# Patient Record
Sex: FEMALE | Race: BLACK OR AFRICAN AMERICAN | NOT HISPANIC OR LATINO | Employment: UNEMPLOYED | URBAN - METROPOLITAN AREA
[De-identification: names, ages, dates, MRNs, and addresses within clinical notes are randomized per-mention and may not be internally consistent; named-entity substitution may affect disease eponyms.]

---

## 2017-03-31 ENCOUNTER — HOSPITAL ENCOUNTER (EMERGENCY)
Facility: HOSPITAL | Age: 37
Discharge: HOME | End: 2017-03-31
Attending: EMERGENCY MEDICINE
Payer: MEDICAID

## 2017-03-31 VITALS
BODY MASS INDEX: 39.68 KG/M2 | HEART RATE: 64 BPM | OXYGEN SATURATION: 100 % | RESPIRATION RATE: 18 BRPM | TEMPERATURE: 98 F | WEIGHT: 293 LBS | DIASTOLIC BLOOD PRESSURE: 81 MMHG | HEIGHT: 72 IN | SYSTOLIC BLOOD PRESSURE: 172 MMHG

## 2017-03-31 DIAGNOSIS — R60.0 LOWER EXTREMITY EDEMA: Primary | ICD-10-CM

## 2017-03-31 DIAGNOSIS — D64.9 ANEMIA, UNSPECIFIED TYPE: ICD-10-CM

## 2017-03-31 LAB
ALBUMIN SERPL BCP-MCNC: 3.5 G/DL
ALP SERPL-CCNC: 56 U/L
ALT SERPL W/O P-5'-P-CCNC: 13 U/L
ANION GAP SERPL CALC-SCNC: 7 MMOL/L
AST SERPL-CCNC: 14 U/L
B-HCG UR QL: NEGATIVE
BASOPHILS # BLD AUTO: 0.01 K/UL
BASOPHILS NFR BLD: 0.2 %
BILIRUB SERPL-MCNC: 0.3 MG/DL
BILIRUB UR QL STRIP: NEGATIVE
BNP SERPL-MCNC: 21 PG/ML
BUN SERPL-MCNC: 10 MG/DL
CALCIUM SERPL-MCNC: 8.7 MG/DL
CHLORIDE SERPL-SCNC: 108 MMOL/L
CLARITY UR: CLEAR
CO2 SERPL-SCNC: 24 MMOL/L
COLOR UR: YELLOW
CREAT SERPL-MCNC: 0.9 MG/DL
CTP QC/QA: YES
DIFFERENTIAL METHOD: ABNORMAL
EOSINOPHIL # BLD AUTO: 0.1 K/UL
EOSINOPHIL NFR BLD: 1.5 %
ERYTHROCYTE [DISTWIDTH] IN BLOOD BY AUTOMATED COUNT: 15.1 %
EST. GFR  (AFRICAN AMERICAN): >60 ML/MIN/1.73 M^2
EST. GFR  (NON AFRICAN AMERICAN): >60 ML/MIN/1.73 M^2
GLUCOSE SERPL-MCNC: 90 MG/DL
GLUCOSE UR QL STRIP: NEGATIVE
HCT VFR BLD AUTO: 29.9 %
HGB BLD-MCNC: 9.4 G/DL
HGB UR QL STRIP: NEGATIVE
KETONES UR QL STRIP: NEGATIVE
LEUKOCYTE ESTERASE UR QL STRIP: NEGATIVE
LYMPHOCYTES # BLD AUTO: 2.1 K/UL
LYMPHOCYTES NFR BLD: 39.5 %
MCH RBC QN AUTO: 27.5 PG
MCHC RBC AUTO-ENTMCNC: 31.4 %
MCV RBC AUTO: 87 FL
MONOCYTES # BLD AUTO: 0.4 K/UL
MONOCYTES NFR BLD: 8.1 %
NEUTROPHILS # BLD AUTO: 2.7 K/UL
NEUTROPHILS NFR BLD: 50.5 %
NITRITE UR QL STRIP: NEGATIVE
PH UR STRIP: 6 [PH] (ref 5–8)
PLATELET # BLD AUTO: 237 K/UL
PMV BLD AUTO: 10.2 FL
POTASSIUM SERPL-SCNC: 3.8 MMOL/L
PROT SERPL-MCNC: 7.1 G/DL
PROT UR QL STRIP: NEGATIVE
RBC # BLD AUTO: 3.42 M/UL
SODIUM SERPL-SCNC: 139 MMOL/L
SP GR UR STRIP: 1.03 (ref 1–1.03)
TROPONIN I SERPL DL<=0.01 NG/ML-MCNC: <0.006 NG/ML
URN SPEC COLLECT METH UR: NORMAL
UROBILINOGEN UR STRIP-ACNC: NEGATIVE EU/DL
WBC # BLD AUTO: 5.34 K/UL

## 2017-03-31 PROCEDURE — 80053 COMPREHEN METABOLIC PANEL: CPT

## 2017-03-31 PROCEDURE — 84484 ASSAY OF TROPONIN QUANT: CPT

## 2017-03-31 PROCEDURE — 83880 ASSAY OF NATRIURETIC PEPTIDE: CPT

## 2017-03-31 PROCEDURE — 99284 EMERGENCY DEPT VISIT MOD MDM: CPT | Mod: 25

## 2017-03-31 PROCEDURE — 81003 URINALYSIS AUTO W/O SCOPE: CPT

## 2017-03-31 PROCEDURE — 25000003 PHARM REV CODE 250: Performed by: EMERGENCY MEDICINE

## 2017-03-31 PROCEDURE — 81025 URINE PREGNANCY TEST: CPT | Performed by: EMERGENCY MEDICINE

## 2017-03-31 PROCEDURE — 85025 COMPLETE CBC W/AUTO DIFF WBC: CPT

## 2017-03-31 RX ORDER — DOCUSATE SODIUM 100 MG/1
100 CAPSULE, LIQUID FILLED ORAL 2 TIMES DAILY PRN
Qty: 60 CAPSULE | Refills: 2 | Status: SHIPPED | OUTPATIENT
Start: 2017-03-31 | End: 2017-08-17

## 2017-03-31 RX ORDER — FERROUS SULFATE 325(65) MG
325 TABLET ORAL DAILY
Qty: 30 TABLET | Refills: 2 | Status: SHIPPED | OUTPATIENT
Start: 2017-03-31 | End: 2017-08-25

## 2017-03-31 RX ORDER — FUROSEMIDE 20 MG/1
20 TABLET ORAL DAILY PRN
Qty: 30 TABLET | Refills: 2 | Status: SHIPPED | OUTPATIENT
Start: 2017-03-31 | End: 2017-08-17

## 2017-03-31 RX ORDER — FUROSEMIDE 20 MG/1
20 TABLET ORAL
Status: COMPLETED | OUTPATIENT
Start: 2017-03-31 | End: 2017-03-31

## 2017-03-31 RX ADMIN — FUROSEMIDE 20 MG: 20 TABLET ORAL at 05:03

## 2017-03-31 NOTE — ED PROVIDER NOTES
"Encounter Date: 3/31/2017    SCRIBE #1 NOTE: I, Apple Oliveira, am scribing for, and in the presence of,  Margot Zavala MD. I have scribed the following portions of the note - Other sections scribed: HPI, ROS.       History     Chief Complaint   Patient presents with    Foot Swelling     "I have swelling in my legs and feet x4 days."     Review of patient's allergies indicates:  No Known Allergies  HPI Comments: CC: Foot Swelling    HPI: 37 year old female with a PMHx of HTN and arthritis presents to the ED c/o acute bilateral foot swelling which started 6 days ago. Patient states she is experiencing "tightness and tingling" from her knees to her feet. Patient reports associated intermittent right sided chest pain described as a pressure. Patient notes she has been sitting more, but nothing unusual like dietary change or long trips. Patient denies a hx of similar symptoms. Patient reports compliance with her daily Losartan-Hydrochlorothiazide. Patient otherwise denies fatigue, shortness of breath and other symptoms.      The history is provided by the patient. No  was used.     Past Medical History:   Diagnosis Date    Arthritis     Hypertension      Past Surgical History:   Procedure Laterality Date     SECTION, LOW TRANSVERSE  2010    TUBAL LIGATION           Family History   Problem Relation Age of Onset    Hypertension Mother      Social History   Substance Use Topics    Smoking status: Former Smoker    Smokeless tobacco: Never Used    Alcohol use No     Review of Systems   Constitutional: Negative for appetite change, fatigue and unexpected weight change.   HENT: Negative for ear pain, rhinorrhea and sore throat.    Eyes: Negative for pain.   Respiratory: Negative for shortness of breath.    Cardiovascular: Positive for chest pain (right sided) and leg swelling (bilateral).   Gastrointestinal: Negative for abdominal pain, diarrhea, nausea and vomiting. "   Genitourinary: Negative for dysuria.   Musculoskeletal: Negative for back pain and neck stiffness.   Skin: Negative for rash.   Neurological: Positive for numbness (bilateral feet). Negative for light-headedness and headaches.       Physical Exam   Initial Vitals   BP Pulse Resp Temp SpO2   03/31/17 0318 03/31/17 0318 03/31/17 0318 03/31/17 0318 03/31/17 0318   149/79 73 18 98 °F (36.7 °C) 100 %     Physical Exam    Nursing note and vitals reviewed.  Constitutional: She appears well-developed and well-nourished. She is Obese . She is cooperative.  Non-toxic appearance. No distress.   Speaking complete sentences.    HENT:   Head: Normocephalic and atraumatic.   Mouth/Throat: Oropharynx is clear and moist.   Eyes: EOM are normal. Pupils are equal, round, and reactive to light.   Conj pallor.   Neck: Normal range of motion and full passive range of motion without pain. Neck supple. No thyromegaly present. No JVD present.   Cardiovascular: Normal rate, regular rhythm, normal heart sounds and normal pulses.   No murmur heard.  Pulmonary/Chest: Effort normal and breath sounds normal. No respiratory distress.   Lungs clear to auscultation.    Abdominal: Soft. Normal appearance and bowel sounds are normal. She exhibits no distension and no mass. There is no tenderness.   Musculoskeletal: Normal range of motion.   1+ edema to bilateral feet. No calf tenderness.    Neurological: She is alert and oriented to person, place, and time. She has normal strength.   Skin: Skin is warm, dry and intact. No rash noted. There is pallor.   Psychiatric: She has a normal mood and affect. Her speech is normal.         ED Course   Procedures  Labs Reviewed   CBC W/ AUTO DIFFERENTIAL - Abnormal; Notable for the following:        Result Value    RBC 3.42 (*)     Hemoglobin 9.4 (*)     Hematocrit 29.9 (*)     MCHC 31.4 (*)     RDW 15.1 (*)     All other components within normal limits   URINALYSIS   COMPREHENSIVE METABOLIC PANEL   B-TYPE  NATRIURETIC PEPTIDE   TROPONIN I   POCT URINE PREGNANCY     EKG Readings: (Independently Interpreted)   04:15: NSR, HR 61. Normal axis and intervals. No STEMI.       X-Rays:   Independently Interpreted Readings:   Other Readings:  CXR NAD    Medical Decision Making:   History:   Old Medical Records: I decided to obtain old medical records.  Old Records Summarized: records from previous admission(s).  Initial Assessment:   37 y.o. Female with bilateral LE swelling x several days.  Differential Diagnosis:   Ddx includes KATTY, CHF, gravitational edema, other.  Independently Interpreted Test(s):   I have ordered and independently interpreted X-rays - see prior notes.  I have ordered and independently interpreted EKG Reading(s) - see prior notes  Clinical Tests:   Lab Tests: Ordered and Reviewed  Radiological Study: Reviewed and Ordered  Medical Tests: Ordered and Reviewed  ED Management:  Patient with minimal edema to my exam, pedal only. No calf TTP. Labs reassuring aside from reportedly chronic anemia -- no proteinuria, no elevated BNP, etc. CXR also without evidence of heart failure. BP here elevated, other VSS. Will add additional diuretic to patient's medication regimen -- had PO lasix here and will d/c on same. Encouraged to f/u to PMD for BP recheck and reevaluation of edema. Also educated re: propping up legs, compression stockings. D/c'ed in NAD.            Scribe Attestation:   Scribe #1: I performed the above scribed service and the documentation accurately describes the services I performed. I attest to the accuracy of the note.    Attending Attestation:           Physician Attestation for Scribe:  Physician Attestation Statement for Scribe #1: I, Margot Zavala MD, reviewed documentation, as scribed by Apple Oliveira in my presence, and it is both accurate and complete.                 ED Course     Clinical Impression:   The primary encounter diagnosis was Lower extremity edema. A diagnosis of Anemia,  unspecified type was also pertinent to this visit.          Margot Zavala MD  04/02/17 2565

## 2017-03-31 NOTE — ED TRIAGE NOTES
37 year old female arrives to the ED via personal transportation due to bilaterally LE edema x 3-4 days. Pt reports pain 7/10. Pt has hx of HTN. Denies SOB, CP, abdominal pain and in no acute distress.

## 2017-03-31 NOTE — ED AVS SNAPSHOT
[facility]  [address] [place] [address]               Maryam Oneal   3/31/2017  3:21 AM   ED    Description:  Female : 1980   Department:  [facility]           Your Care was Coordinated By:     Provider Role From To    Margot Zavala MD Attending Provider 17 0415 --      Reason for Visit     Foot Swelling           Diagnoses this Visit        Comments    Lower extremity edema    -  Primary     Anemia, unspecified type           ED Disposition     None           To Do List           Follow-up Information     Follow up with Mich Castle MD In 2 weeks.    Specialty:  Family Medicine    Contact information:    [address] [facility] [address]  [phone]         These Medications        Disp Refills Start End    furosemide (LASIX) 20 MG tablet 30 tablet 2 3/31/2017 3/31/2018    Take 1 tablet (20 mg total) by mouth daily as needed (swelling of feet). - Oral    Pharmacy: [email or web address] [address] [place] [facility] Ph #: [phone]       docusate sodium (COLACE) 100 MG capsule 60 capsule 2 3/31/2017     Take 1 capsule (100 mg total) by mouth 2 (two) times daily as needed for Constipation. - Oral    Pharmacy: [email or web address] [address] [place] AT [facility] Ph #: [phone]       ferrous sulfate 325 mg (65 mg iron) Tab tablet 30 tablet 2 3/31/2017     Take 1 tablet (325 mg total) by mouth once daily. - Oral    Pharmacy: [address] [place] [facility] Ph #: [phone]         [facility]     [facility] Care Line -  Assistance  Unless otherwise directed by your provider, please contact [facility], our nurse care line that is available for  assistance.     Registered nurses in the [facility] provide: appointment scheduling, clinical  advisement, health education, and other advisory services.  Call: [phone] (toll free)               Medications           Message regarding Medications     Verify the changes and/or additions to your medication regime listed below are the same as discussed with your clinician today.  If any of these changes or additions are incorrect, please notify your healthcare provider.        START taking these NEW medications        Refills    furosemide (LASIX) 20 MG tablet 2    Sig: Take 1 tablet (20 mg total) by mouth daily as needed (swelling of feet).    Class: Print    Route: Oral    docusate sodium (COLACE) 100 MG capsule 2    Sig: Take 1 capsule (100 mg total) by mouth 2 (two) times daily as needed for Constipation.    Class: Print    Route: Oral    ferrous sulfate 325 mg (65 mg iron) Tab tablet 2    Sig: Take 1 tablet (325 mg total) by mouth once daily.    Class: Print    Route: Oral           Verify that the below list of medications is an accurate representation of the medications you are currently taking.  If none reported, the list may be blank. If incorrect, please contact your healthcare provider. Carry this list with you in case of emergency.           Current Medications     losartan-hydrochlorothiazide 100-25 mg (HYZAAR) 100-25 mg per tablet Take 1 tablet by mouth once daily.    acetaminophen (TYLENOL) 325 MG tablet Take 1 tablet (325 mg total) by mouth every 6 (six) hours as needed for Pain.    docusate sodium (COLACE) 100 MG capsule Take 1 capsule (100 mg total) by mouth 2 (two) times daily as needed for Constipation.    ferrous sulfate 325 mg (65 mg iron) Tab tablet Take 1 tablet (325 mg total) by mouth once daily.    furosemide (LASIX) 20 MG tablet Take 1 tablet (20 mg total) by mouth daily as needed (swelling of feet).    ibuprofen (ADVIL,MOTRIN) 800 MG tablet Take 1 tablet (800 mg total) by mouth every 6 (six) hours as needed for Pain.           Clinical Reference Information           Your  "Vitals Were     BP Pulse Temp Resp Height Weight    168/86 66 98 °F (36.7 °C) (Oral) 18 6' 4" (1.93 m) 181.4 kg (400 lb)    SpO2 BMI             100% 48.69 kg/m2         Allergies as of 3/31/2017     No Known Allergies      Immunizations Administered on Date of Encounter - 3/31/2017     None      ED Micro, Lab, POCT     Start Ordered       Status Ordering Provider    03/31/17 0336 03/31/17 0336  POCT urine pregnancy  Once      Acknowledged     03/31/17 0336 03/31/17 0336  Urinalysis  STAT      Acknowledged     03/31/17 0336 03/31/17 0336  Comprehensive metabolic panel  STAT      Final result     03/31/17 0336 03/31/17 0336  CBC auto differential  STAT      Final result     03/31/17 0336 03/31/17 0336  Brain natriuretic peptide  STAT      Final result     03/31/17 0336 03/31/17 0336  Troponin I  STAT      Final result       ED Imaging Orders     Start Ordered       Status Ordering Provider    03/31/17 0336 03/31/17 0336  X-Ray Chest PA And Lateral  1 time imaging      Final result       Discharge References/Attachments     LEG SWELLING IN BOTH LEGS (ENGLISH)      Smoking Cessation     If you would like to quit smoking:   You may be eligible for free services if you are a [place] resident and started smoking cigarettes before September 1, 1988.  Call the [employer]oking [employer]ess[employer] ([employer]) toll free at (756) 946-8670 or (894) 011-7648.   Call 0-153-QUIT-NOW if you do not meet the above criteria.   Contact us via email: [email or web address]   View our website for more information: [email or web address]         [facility] complies with applicable Federal civil rights laws and does not discriminate on the basis of race, color, national origin, age, disability, or sex.        Language Assistance Services     ATTENTION: Language assistance services are available, free of charge. Please call [phone].      ATENCIÓN: Si habla español, tiene a petersen disposición servicios gratuitos de asistencia " lingüística. [facility] [phone].     MARLEY Ý: N?u b?n nói Ti?ng Vi?t, có các d?ch v? h? tr? ngôn ng? mi?n phí dành cho b?n. G?i s? [phone].

## 2017-04-19 ENCOUNTER — HOSPITAL ENCOUNTER (EMERGENCY)
Facility: OTHER | Age: 37
Discharge: HOME | End: 2017-04-19
Attending: EMERGENCY MEDICINE
Payer: MEDICAID

## 2017-04-19 VITALS
OXYGEN SATURATION: 100 % | BODY MASS INDEX: 39.68 KG/M2 | SYSTOLIC BLOOD PRESSURE: 148 MMHG | TEMPERATURE: 98 F | DIASTOLIC BLOOD PRESSURE: 88 MMHG | HEART RATE: 64 BPM | RESPIRATION RATE: 18 BRPM | WEIGHT: 293 LBS | HEIGHT: 72 IN

## 2017-04-19 DIAGNOSIS — M79.669 PAIN AND SWELLING OF LOWER LEG: ICD-10-CM

## 2017-04-19 DIAGNOSIS — M79.89 PAIN AND SWELLING OF LOWER LEG: ICD-10-CM

## 2017-04-19 LAB
ALBUMIN SERPL-MCNC: 3.3 G/DL (ref 3.3–5.5)
ALP SERPL-CCNC: 49 U/L (ref 42–141)
BILIRUB SERPL-MCNC: 0.7 MG/DL (ref 0.2–1.6)
BILIRUBIN, POC UA: NEGATIVE
BLOOD, POC UA: NEGATIVE
BUN SERPL-MCNC: 10 MG/DL (ref 7–22)
CALCIUM SERPL-MCNC: 9.1 MG/DL (ref 8–10.3)
CHLORIDE SERPL-SCNC: 104 MMOL/L (ref 98–108)
CLARITY, POC UA: CLEAR
COLOR, POC UA: YELLOW
CREAT SERPL-MCNC: 0.7 MG/DL (ref 0.6–1.2)
GLUCOSE SERPL-MCNC: 95 MG/DL (ref 73–118)
GLUCOSE, POC UA: NEGATIVE
KETONES, POC UA: NEGATIVE
LEUKOCYTE EST, POC UA: NEGATIVE
NITRITE, POC UA: NEGATIVE
PH UR STRIP: 5.5 [PH]
POC ALT (SGPT): 10 U/L (ref 10–47)
POC AST (SGOT): 17 U/L (ref 11–38)
POC B-TYPE NATRIURETIC PEPTIDE: 18.6 PG/ML (ref 0–100)
POC TCO2: 26 MMOL/L (ref 18–33)
POTASSIUM BLD-SCNC: 3.7 MMOL/L (ref 3.6–5.1)
PROTEIN, POC UA: NEGATIVE
PROTEIN, POC: 7.1 G/DL (ref 6.4–8.1)
SODIUM BLD-SCNC: 137 MMOL/L (ref 128–145)
SPECIFIC GRAVITY, POC UA: >=1.03
UROBILINOGEN, POC UA: 1 E.U./DL

## 2017-04-19 PROCEDURE — 99284 EMERGENCY DEPT VISIT MOD MDM: CPT

## 2017-04-19 NOTE — ED AVS SNAPSHOT
[facility] EMERGENCY DEPARTMENT  [address] [address]               Maryam Oneal   2017  9:50 AM   ED    Description:  Female : 1980   Department:  [facility]           Your Care was Coordinated By:     Provider Role From To    Farheen Story MD Attending Provider 17 1019 --      Reason for Visit     Leg Pain           Diagnoses this Visit        Comments    Pain and swelling of lower leg           ED Disposition     None           To Do List           Follow-up Information     Follow up with Mich Castle MD.    Specialty:  Family Medicine    Why:  CALL TO SCHEDULE CLOSE FOLLOW UP APPOINTMENT    Contact information:    [address]  [facility] [address]  [phone]          Follow up with Clayton Jackson NP.    Specialty:  Internal Medicine    Why:  CALL TO SCHEDULE FOLLOW UP APPOINTMENT    Contact information:    [address] [address]  [phone]        [facility]     [facility] On [facility] Nurse Care Line -  Assistance  Unless otherwise directed by your provider, please contact [facility], our nurse care line that is available for  assistance.     Registered nurses in the [facility] On Call Center provide: appointment scheduling, clinical advisement, health education, and other advisory services.  Call: [phone] (toll free)               Medications           Message regarding Medications     Verify the changes and/or additions to your medication regime listed below are the same as discussed with your clinician today.  If any of these changes or additions are incorrect, please notify your healthcare provider.             Verify that the below list of medications is an accurate representation of the medications you are currently taking.  If none reported, the list may be blank. If incorrect, please contact your healthcare provider. Carry this list with you in case of emergency.     "       Current Medications     losartan-hydrochlorothiazide 100-25 mg (HYZAAR) 100-25 mg per tablet Take 1 tablet by mouth once daily.    acetaminophen (TYLENOL) 325 MG tablet Take 1 tablet (325 mg total) by mouth every 6 (six) hours as needed for Pain.    docusate sodium (COLACE) 100 MG capsule Take 1 capsule (100 mg total) by mouth 2 (two) times daily as needed for Constipation.    ferrous sulfate 325 mg (65 mg iron) Tab tablet Take 1 tablet (325 mg total) by mouth once daily.    furosemide (LASIX) 20 MG tablet Take 1 tablet (20 mg total) by mouth daily as needed (swelling of feet).    ibuprofen (ADVIL,MOTRIN) 800 MG tablet Take 1 tablet (800 mg total) by mouth every 6 (six) hours as needed for Pain.           Clinical Reference Information           Your Vitals Were     BP Pulse Temp Resp Height Weight    148/88 (BP Location: Left arm, Patient Position: Lying) 64 97.9 °F (36.6 °C) (Oral) 18 6' 4" (1.93 m) 181.4 kg (400 lb)    Last Period SpO2 BMI          03/20/2017 (Exact Date) 100% 48.69 kg/m2        Allergies as of 4/19/2017     No Known Allergies      Immunizations Administered on Date of Encounter - 4/19/2017     None      ED Micro, Lab, POCT     Start Ordered       Status Ordering Provider    04/19/17 1114 04/19/17 1114  POCT B-type natriuretic peptide (BNP)  Once      Final result     04/19/17 1100 04/19/17 1059  POCT CBC  Once      Final result     04/19/17 1048 04/19/17 1048  POCT URINALYSIS W/O SCOPE  Once      Final result     04/19/17 1037 04/19/17 1037  POCT CMP  Once      Final result     04/19/17 1032 04/19/17 1031  POCT B-type natriuretic peptide (BNP)  Once      Completed     04/19/17 1032 04/19/17 1031  POCT CMP  Once      Completed     04/19/17 1032 04/19/17 1031  POCT URINALYSIS W/O SCOPE  Once      Completed       ED Imaging Orders     Start Ordered       Status Ordering Provider    04/19/17 1032 04/19/17 1031  US Lower Extremity Veins Bilateral  1 time imaging      Final result     "     Discharge Instructions           CALL TO SCHEDULE A FOLLOW UP APPOINTMENT WITH PRIMARY CARE    Low-Salt Diet  This diet removes foods that are high in salt. It also limits the amount of salt you use when cooking. It is most often used for people with high blood pressure, edema (fluid retention), and kidney, liver, or heart disease.  Table salt contains the mineral sodium. Your body needs sodium to work normally. But too much sodium can make your health problems worse. Your healthcare provider is recommending a low-salt (also called low-sodium) diet for you. Your total daily allowance of salt is 1,500 to 2,300 milligrams (mg). It is less than 1 teaspoon of table salt. This means you can have only about 500 to 700 mg of sodium at each meal. People with certain health problems should limit salt intake to the lower end of the recommended range.    When you cook, dont add much salt. If you can cook without using salt, even better. Dont add salt to your food at the table.  When shopping, read food labels. Salt is often called sodium on the label. Choose foods that are salt-free, low salt, or very low salt. Note that foods with reduced salt may not lower your salt intake enough.    Beans, potatoes, and pasta  Ok: Dry beans, split peas, lentils, potatoes, rice, macaroni, pasta, spaghetti without added salt  Avoid: Potato chips, tortilla chips, and similar products  Breads and cereals  Ok: Low-sodium breads, rolls, cereals, and cakes; low-salt crackers, matzo crackers  Avoid: Salted crackers, pretzels, popcorn, Sami toast, pancakes, muffins  Dairy  Ok: Milk, chocolate milk, hot chocolate mix, low-salt cheeses, and yogurt  Avoid: Processed cheese and cheese spreads; Roquefort, Camembert, and cottage cheese; buttermilk, instant breakfast drink  Desserts  Ok: Ice cream, frozen yogurt, juice bars, gelatin, cookies and pies, sugar, honey, jelly, hard candy  Avoid: Most pies, cakes and cookies prepared or processed with  salt; instant pudding  Drinks  Ok: Tea, coffee, fizzy (carbonated) drinks, juices  Avoid: Flavored coffees, electrolyte replacement drinks, sports drinks  Meats  Ok: All fresh meat, fish, poultry, low-salt tuna, eggs, egg substitute  Avoid: Smoked, pickled, brine-cured, or salted meats and fish. This includes schmitt, chipped beef, corned beef, hot dogs, deli meats, ham, kosher meats, salt pork, sausage, canned tuna, salted codfish, smoked salmon, herring, sardines, or anchovies.  Seasonings and spices  Ok: Most seasonings are okay. Good substitutes for salt include: fresh herb blends, hot sauce, lemon, garlic, caballero, vinegar, dry mustard, parsley, cilantro, horseradish, tomato paste, regular margarine, mayonnaise, unsalted butter, cream cheese, vegetable oil, cream, low-salt salad dressing and gravy.  Avoid: Regular ketchup, relishes, pickles, soy sauce, teriyaki sauce, Worcestershire sauce, BBQ sauce, tartar sauce, meat tenderizer, chili sauce, regular gravy, regular salad dressing, salted butter  Soups  Ok: Low-salt soups and broths made with allowed foods  Avoid: Bouillon cubes, soups with smoked or salted meats, regular soup and broth  Vegetables  Ok: Most vegetables are okay; also low-salt tomato and vegetable juices  Avoid: Sauerkraut and other brine-soaked vegetables; pickles and other pickled vegetables; tomato juice, olives  Date Last Reviewed: 8/1/2016 © 2000-2016 [employer]. [address] [place] [address], [place] [address]. All rights reserved. This information is not intended as a substitute for professional medical care. Always follow your healthcare professional's instructions.        Leg Swelling in Both Legs    Swelling of the feet, ankles, and legs is called edema. It is caused by excess fluid that has collected in the tissues. Extra fluid in the body settles in the lowest part because of gravity. This is why the legs and feet are most affected.  Some of the causes for edema  include:  · Disease of the heart like congestive heart failure  · Standing or sitting for long periods of time  · Infection of the feet or legs  · Blood pooling in the veins of your legs (venous insufficiency)  · Dilated veins in your lower leg (varicose veins)  · Garters or other clothing that is tight on your legs. This will cause blood to pool in your legs because the clothing limits blood flow.  · Some medicines such as hormones like birth control pills, some blood pressure medicines like calcium channel blockers (amlodipine) and steroids, some antidepressants like MAO inhibitors and tricyclics  · Menstrual periods that cause you to retain fluids  · Many types of renal disease  · Liver failure or cirrhosis  · Pregnancy, some swelling is normal, but a sudden increase in leg swelling or weight gain can be a sign of a dangerous complication of pregnancy  · Poor nutrition  · Thyroid disease  Medical treatment will depend on what is causing the swelling in your legs. Your healthcare provider may prescribe water pills (diuretics) to get rid of the extra fluid.  Home care  Follow these guidelines when caring for yourself at home:  · Don't wear clothing like garters that is tight on your legs.  · Keep your legs up while lying or sitting.  · If infection, injury, or recent surgery is causing the swelling, stay off your legs as much as possible until symptoms get better.  · If your healthcare provider says that your leg swelling is caused by venous insufficiency or varicose veins, don't sit or  one place for long periods of time. Take breaks and walk about every few hours. Brisk walking is a good exercise. It helps circulate the blood that has collected in your leg. Talk with your provider about using support stockings to stop daytime leg swelling.  · If your provider says that heart disease is causing your leg swelling, follow a low-salt diet to stop extra fluid from staying in your body. You may also need  medicine.  Follow-up care  Follow up with your healthcare provider, or as advised.  When to seek medical advice  Call your healthcare provider right away if any of these occur:  · New shortness of breath or chest pain  · Shortness of breath or chest pain that gets worse  · Swelling in both legs or ankles that gets worse  · Swelling of the abdomen  · Redness, warmth, or swelling in one leg  · Fever of 100.4ºF (38ºC) or higher, or as directed by your healthcare provider  · Yellow color to your skin or eyes  · Rapid, unexplained weight gain  · Having to sleep upright or use an increased number of pillows  Date Last Reviewed: 3/31/2016  © 6375-1383 [employer]. [address] [place] [address], [place] [address]. All rights reserved. This information is not intended as a substitute for professional medical care. Always follow your healthcare professional's instructions.          Smoking Cessation     If you would like to quit smoking:   You may be eligible for free services if you are a [place] resident and started smoking cigarettes before September 1, 1988.  Call the Smoking Cessation Trust ([facility] [[email or web address]]) toll free at [phone] or [phone].   Call 1-800-QUIT-NOW if you do not meet the above criteria.   Contact us via email: [email or web address]   View our website for more information: [email or web address]         [facility] complies with applicable Federal civil rights laws and does not discriminate on the basis of race, color, national origin, age, disability, or sex.        Language Assistance Services     ATTENTION: Language assistance services are available, free of charge. Please call [phone].      ATENCIÓN: Si habla español, tiene a petersen disposición servicios gratuitos de asistencia lingüística. Llame al [phone].     CHÚ Ý: N?u b?n nói Ti?ng Vi?t, có các d?ch v? h? tr? ngôn ng? mi?n phí dành cho b?n. G?i s? [phone].

## 2017-04-19 NOTE — DISCHARGE INSTRUCTIONS
CALL TO SCHEDULE A FOLLOW UP APPOINTMENT WITH PRIMARY CARE    Low-Salt Diet  This diet removes foods that are high in salt. It also limits the amount of salt you use when cooking. It is most often used for people with high blood pressure, edema (fluid retention), and kidney, liver, or heart disease.  Table salt contains the mineral sodium. Your body needs sodium to work normally. But too much sodium can make your health problems worse. Your healthcare provider is recommending a low-salt (also called low-sodium) diet for you. Your total daily allowance of salt is 1,500 to 2,300 milligrams (mg). It is less than 1 teaspoon of table salt. This means you can have only about 500 to 700 mg of sodium at each meal. People with certain health problems should limit salt intake to the lower end of the recommended range.    When you cook, dont add much salt. If you can cook without using salt, even better. Dont add salt to your food at the table.  When shopping, read food labels. Salt is often called sodium on the label. Choose foods that are salt-free, low salt, or very low salt. Note that foods with reduced salt may not lower your salt intake enough.    Beans, potatoes, and pasta  Ok: Dry beans, split peas, lentils, potatoes, rice, macaroni, pasta, spaghetti without added salt  Avoid: Potato chips, tortilla chips, and similar products  Breads and cereals  Ok: Low-sodium breads, rolls, cereals, and cakes; low-salt crackers, matzo crackers  Avoid: Salted crackers, pretzels, popcorn, [place] sor[place] toast, pancakes, muffins  Dairy  Ok: Milk, chocolate milk, hot chocolate mix, low-salt cheeses, and yogurt  Avoid: Processed cheese and cheese spreads; Roquefort, Camembert, and cottage cheese; buttermilk, instant breakfast drink  Desserts  Ok: Ice cream, frozen yogurt, juice bars, gelatin, cookies and pies, sugar, honey, jelly, hard candy  Avoid: Most pies, cakes and cookies prepared or processed with salt; instant pudding  Drinks  Ok:  Tea, coffee, fizzy (carbonated) drinks, juices  Avoid: Flavored coffees, electrolyte replacement drinks, sports drinks  Meats  Ok: All fresh meat, fish, poultry, low-salt tuna, eggs, egg substitute  Avoid: Smoked, pickled, brine-cured, or salted meats and fish. This includes schmitt, chipped beef, corned beef, hot dogs, deli meats, ham, kosher meats, salt pork, sausage, canned tuna, salted codfish, smoked salmon, herring, sardines, or anchovies.  Seasonings and spices  Ok: Most seasonings are okay. Good substitutes for salt include: fresh herb blends, hot sauce, lemon, garlic, caballero, vinegar, dry mustard, parsley, cilantro, horseradish, tomato paste, regular margarine, mayonnaise, unsalted butter, cream cheese, vegetable oil, cream, low-salt salad dressing and gravy.  Avoid: Regular ketchup, relishes, pickles, soy sauce, teriyaki sauce, Worcestershire sauce, BBQ sauce, tartar sauce, meat tenderizer, chili sauce, regular gravy, regular salad dressing, salted butter  Soups  Ok: Low-salt soups and broths made with allowed foods  Avoid: Bouillon cubes, soups with smoked or salted meats, regular soup and broth  Vegetables  Ok: Most vegetables are okay; also low-salt tomato and vegetable juices  Avoid: Sauerkraut and other brine-soaked vegetables; pickles and other pickled vegetables; tomato juice, olives  Date Last Reviewed: 8/1/2016 © 2000-2016 [employer]. [address] [place] [address], [place] [address]. All rights reserved. This information is not intended as a substitute for professional medical care. Always follow your healthcare professional's instructions.        Leg Swelling in Both Legs    Swelling of the feet, ankles, and legs is called edema. It is caused by excess fluid that has collected in the tissues. Extra fluid in the body settles in the lowest part because of gravity. This is why the legs and feet are most affected.  Some of the causes for edema include:  · Disease of the heart like congestive  heart failure  · Standing or sitting for long periods of time  · Infection of the feet or legs  · Blood pooling in the veins of your legs (venous insufficiency)  · Dilated veins in your lower leg (varicose veins)  · Garters or other clothing that is tight on your legs. This will cause blood to pool in your legs because the clothing limits blood flow.  · Some medicines such as hormones like birth control pills, some blood pressure medicines like calcium channel blockers (amlodipine) and steroids, some antidepressants like MAO inhibitors and tricyclics  · Menstrual periods that cause you to retain fluids  · Many types of renal disease  · Liver failure or cirrhosis  · Pregnancy, some swelling is normal, but a sudden increase in leg swelling or weight gain can be a sign of a dangerous complication of pregnancy  · Poor nutrition  · Thyroid disease  Medical treatment will depend on what is causing the swelling in your legs. Your healthcare provider may prescribe water pills (diuretics) to get rid of the extra fluid.  Home care  Follow these guidelines when caring for yourself at home:  · Don't wear clothing like garters that is tight on your legs.  · Keep your legs up while lying or sitting.  · If infection, injury, or recent surgery is causing the swelling, stay off your legs as much as possible until symptoms get better.  · If your healthcare provider says that your leg swelling is caused by venous insufficiency or varicose veins, don't sit or  one place for long periods of time. Take breaks and walk about every few hours. Brisk walking is a good exercise. It helps circulate the blood that has collected in your leg. Talk with your provider about using support stockings to stop daytime leg swelling.  · If your provider says that heart disease is causing your leg swelling, follow a low-salt diet to stop extra fluid from staying in your body. You may also need medicine.  Follow-up care  Follow up with your  healthcare provider, or as advised.  When to seek medical advice  Call your healthcare provider right away if any of these occur:  · New shortness of breath or chest pain  · Shortness of breath or chest pain that gets worse  · Swelling in both legs or ankles that gets worse  · Swelling of the abdomen  · Redness, warmth, or swelling in one leg  · Fever of 100.4ºF (38ºC) or higher, or as directed by your healthcare provider  · Yellow color to your skin or eyes  · Rapid, unexplained weight gain  · Having to sleep upright or use an increased number of pillows  Date Last Reviewed: 3/31/2016  © 9689-8285 [employer]. [address] [place] [address], [place] [address]. All rights reserved. This information is not intended as a substitute for professional medical care. Always follow your healthcare professional's instructions.

## 2017-04-19 NOTE — ED PROVIDER NOTES
"Encounter Date: 2017       History     Chief Complaint   Patient presents with    Leg Pain     c/o right leg pain x 2 weeks. Pt reports being seen and treated with Lasix, swelling decreased but pain continues.     Review of patient's allergies indicates:  No Known Allergies  HPI Comments: 36 Y/O OBESE AAF PRESENTS WITH BLE SWELLING ( R > L) AND RIGHT CALF PAIN.  PT REPORTS CHRONIC SWELLING OF BLE "FOR MANY MONTHS OR MAYBE EVEN LONGER" AND STATES THAT THE "ACHY PAIN" IN THE RIGHT CALF AND POSTERIOR KNEE BEGAN 3-4 DAYS AGO.  PT WAS SEEN FOR THE SWELLING ON 3/31 AND D/C HOME WITH LASIX THAT SHE HAS BEEN TAKING AS DIRECTED.  PT REPORTS "I HAVE TAKEN LASIX IN THE PAST AND IT USUALLY WORKS A LOT FASTER THAN THIS."  SHE REPORTS ONLY MILD IMPROVEMENT SINCE STARTING THE LASIX. NO EXACERBATING FACTORS.  NO NUMBNESS/WEAKNESS/TINGLING.  NO HX OF TRAUMA.  NO HX OF PE/DVT/IMMOBILIZATION/RECENT SURGERY/MALIGNANCY/HORMONE USE.     The history is provided by the patient and medical records. No  was used.     Past Medical History:   Diagnosis Date    Arthritis     Hypertension      Past Surgical History:   Procedure Laterality Date     SECTION, LOW TRANSVERSE  2010    TUBAL LIGATION           Family History   Problem Relation Age of Onset    Hypertension Mother      Social History   Substance Use Topics    Smoking status: Former Smoker    Smokeless tobacco: Never Used    Alcohol use No     Review of Systems   Constitutional: Negative for activity change, appetite change and fever.   HENT: Negative.    Eyes: Negative.    Respiratory: Negative for cough, chest tightness and shortness of breath.    Cardiovascular: Positive for leg swelling. Negative for chest pain and palpitations.   Gastrointestinal: Negative for diarrhea, nausea and vomiting.   Endocrine: Negative for polydipsia and polyuria.   Genitourinary: Negative for decreased urine volume and difficulty urinating. "   Musculoskeletal: Positive for myalgias. Negative for back pain.        RIGHT POSTERIOR CALF AND KNEE PAIN   Skin: Negative for pallor, rash and wound.   Allergic/Immunologic: Negative for immunocompromised state.   Neurological: Negative for weakness and numbness.   Hematological: Does not bruise/bleed easily.   Psychiatric/Behavioral: Negative.    All other systems reviewed and are negative.      Physical Exam   Initial Vitals   BP Pulse Resp Temp SpO2   04/19/17 0945 04/19/17 0945 04/19/17 0945 04/19/17 0945 04/19/17 0945   148/88 64 18 97.9 °F (36.6 °C) 100 %     Physical Exam    Nursing note and vitals reviewed.  Constitutional: She appears well-developed and well-nourished. She is not diaphoretic. No distress.   HENT:   Head: Normocephalic and atraumatic.   Eyes: Conjunctivae and EOM are normal. No scleral icterus.   Neck: Normal range of motion. Neck supple.   Cardiovascular: Normal rate, regular rhythm and normal heart sounds. Exam reveals no gallop and no friction rub.    No murmur heard.  Pulmonary/Chest: Breath sounds normal. No respiratory distress. She has no wheezes. She has no rhonchi. She has no rales.   Abdominal: Soft. She exhibits no distension. There is no tenderness.   Musculoskeletal: Normal range of motion.        Right lower leg: She exhibits tenderness, swelling and edema. She exhibits no bony tenderness, no deformity and no laceration.        Legs:  BLE NON-PITTING EDEMA - MILD   Lymphadenopathy:     She has no cervical adenopathy.   Neurological: She is alert and oriented to person, place, and time.   Skin: Skin is warm and dry. No erythema.   Psychiatric: She has a normal mood and affect. Her behavior is normal. Judgment and thought content normal.         ED Course   Procedures  Labs Reviewed   POCT URINALYSIS W/O SCOPE - Abnormal; Notable for the following:        Result Value    Glucose, UA Negative (*)     Bilirubin, UA Negative (*)     Ketones, UA Negative (*)     Spec Grav UA  >=1.030 (*)     Blood, UA Negative (*)     Protein, UA Negative (*)     Nitrite, UA Negative (*)     Leukocytes, UA Negative (*)     All other components within normal limits   POCT CMP - Abnormal; Notable for the following:     Albumin, POC 3.3 (*)     ALT (SGPT), POC 10 (*)     All other components within normal limits   POCT URINALYSIS W/O SCOPE   POCT CBC   POCT B-TYPE NATRIURETIC PEPTIDE (BNP)   POCT CMP   POCT B-TYPE NATRIURETIC PEPTIDE (BNP)             Medical Decision Making:   History:   Old Medical Records: I decided to obtain old medical records.  Old Records Summarized: other records.       <> Summary of Records: ED VISIT 3/31/17:  NEG BNP, U/A AND CHEM PANEL.  PT D/C HOME WITH LASIX RX AND INSTRUCTED TO F/U WITH PCP.  Initial Assessment:   38 Y/O F WITH BLE SWELLING (R>L) AND RIGHT CALF PAIN AND POSTERIOR KNEE PAIN.  NO HX OF DVT/PE.  NON-SMOKER.  FULL ROM.  SENSATION AND MOTOR INTACT.    Differential Diagnosis:   DDX: KATTY, DVT, DEPENDENT EDEMA  Clinical Tests:   Lab Tests: Ordered and Reviewed  The following lab test(s) were unremarkable: BMP, Urinalysis and BNP       <> Summary of Lab: CHRONIC ANEMIA IMPROVED COMPARED TO LABS ON 3/31  NEG BNP  NO PROTIENURIA  Radiological Study: Ordered and Reviewed  ED Management:  US IS LIMITED DUE TO PT BODY HABITUS BUT NO EVIDENCE OF DVT IS FOUND IN RLE WHERE THE PATIENT IS SYMPTOMATIC.       PT IS CURRENTLY TAKING LASIX FOR SWELLING AND REPORTS ONLY MILD IMPROVEMENT.  PT HAS A PCP AND I HAVE INSTRUCTED HER TO F/U WITH PCP REGARDING THIS CHRONIC MATTER.  NO EMERGENT CAUSES OF LEG SWELLING FOUND ON THIS ED EVALUATION.     Pt counseled on their diagnosis and the importance of following up with PCP.  Pt also cautioned on when to return to ED.  Pt verbalizes understanding of discharge plan and will return to ED immediately if symptoms worsen                     ED Course     Clinical Impression:   The encounter diagnosis was Pain and swelling of lower  leg.    Disposition:   Disposition: Discharged  Condition: Stable       Farheen Story MD  04/19/17 2007

## 2017-04-19 NOTE — ED NOTES
38 y/o female presents to the ER with the cc of bilateral leg pain that has persisted despite fluid pills given from her PCP. Patient reports she takes Lasix 20 mg once a day and it does not seem like the fluid is not  Resolving. Patient has + swelling + pitting edema bilaterally to the right and left lower legs. Patient reports pain and tenderness with walking. ANNALISA

## 2017-08-17 ENCOUNTER — HOSPITAL ENCOUNTER (EMERGENCY)
Facility: HOSPITAL | Age: 37
Discharge: HOME | End: 2017-08-17
Attending: EMERGENCY MEDICINE
Payer: MEDICAID

## 2017-08-17 VITALS
SYSTOLIC BLOOD PRESSURE: 172 MMHG | DIASTOLIC BLOOD PRESSURE: 86 MMHG | RESPIRATION RATE: 20 BRPM | BODY MASS INDEX: 39.68 KG/M2 | HEART RATE: 64 BPM | OXYGEN SATURATION: 100 % | HEIGHT: 72 IN | WEIGHT: 293 LBS | TEMPERATURE: 99 F

## 2017-08-17 DIAGNOSIS — R51.9 FRONTAL HEADACHE: Primary | ICD-10-CM
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
## 2017-08-17 DIAGNOSIS — M79.652 LEFT THIGH PAIN: ICD-10-CM

## 2017-08-17 LAB
B-HCG UR QL: NEGATIVE
CTP QC/QA: YES

## 2017-08-17 PROCEDURE — 81025 URINE PREGNANCY TEST: CPT | Performed by: PHYSICIAN ASSISTANT

## 2017-08-17 PROCEDURE — 25000003 PHARM REV CODE 250: Performed by: PHYSICIAN ASSISTANT

## 2017-08-17 PROCEDURE — 99283 EMERGENCY DEPT VISIT LOW MDM: CPT | Mod: 25

## 2017-08-17 RX ORDER — BUTALBITAL, ACETAMINOPHEN AND CAFFEINE 50; 325; 40 MG/1; MG/1; MG/1
2 TABLET ORAL
Status: COMPLETED | OUTPATIENT
Start: 2017-08-17 | End: 2017-08-17

## 2017-08-17 RX ORDER — BUTALBITAL, ACETAMINOPHEN AND CAFFEINE 50; 325; 40 MG/1; MG/1; MG/1
1 TABLET ORAL EVERY 4 HOURS PRN
Qty: 12 TABLET | Refills: 0 | Status: SHIPPED | OUTPATIENT
Start: 2017-08-17 | End: 2017-08-25

## 2017-08-17 RX ADMIN — BUTALBITAL, ACETAMINOPHEN AND CAFFEINE 2 TABLET: 50; 325; 40 TABLET ORAL at 10:08
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
## 2017-08-25 ENCOUNTER — HOSPITAL ENCOUNTER (EMERGENCY)
Facility: HOSPITAL | Age: 37
Discharge: HOME | End: 2017-08-25
Attending: EMERGENCY MEDICINE
Payer: MEDICAID

## 2017-08-25 VITALS
DIASTOLIC BLOOD PRESSURE: 86 MMHG | WEIGHT: 293 LBS | RESPIRATION RATE: 20 BRPM | SYSTOLIC BLOOD PRESSURE: 166 MMHG | TEMPERATURE: 99 F | BODY MASS INDEX: 39.68 KG/M2 | OXYGEN SATURATION: 100 % | HEIGHT: 72 IN | HEART RATE: 64 BPM

## 2017-08-25 DIAGNOSIS — Z23 NEED FOR TETANUS BOOSTER: ICD-10-CM

## 2017-08-25 DIAGNOSIS — S61.211A LACERATION OF LEFT INDEX FINGER WITHOUT FOREIGN BODY WITHOUT DAMAGE TO NAIL, INITIAL ENCOUNTER: Primary | ICD-10-CM

## 2017-08-25 LAB
B-HCG UR QL: NEGATIVE
CTP QC/QA: YES

## 2017-08-25 PROCEDURE — 90715 TDAP VACCINE 7 YRS/> IM: CPT | Performed by: NURSE PRACTITIONER

## 2017-08-25 PROCEDURE — 63600175 PHARM REV CODE 636 W HCPCS: Performed by: NURSE PRACTITIONER

## 2017-08-25 PROCEDURE — 90471 IMMUNIZATION ADMIN: CPT | Performed by: NURSE PRACTITIONER

## 2017-08-25 PROCEDURE — 99283 EMERGENCY DEPT VISIT LOW MDM: CPT | Mod: 25

## 2017-08-25 PROCEDURE — 81025 URINE PREGNANCY TEST: CPT | Performed by: NURSE PRACTITIONER

## 2017-08-25 PROCEDURE — 12001 RPR S/N/AX/GEN/TRNK 2.5CM/<: CPT | Mod: F1

## 2017-08-25 RX ADMIN — CLOSTRIDIUM TETANI TOXOID ANTIGEN (FORMALDEHYDE INACTIVATED), CORYNEBACTERIUM DIPHTHERIAE TOXOID ANTIGEN (FORMALDEHYDE INACTIVATED), BORDETELLA PERTUSSIS TOXOID ANTIGEN (GLUTARALDEHYDE INACTIVATED), BORDETELLA PERTUSSIS FILAMENTOUS HEMAGGLUTININ ANTIGEN (FORMALDEHYDE INACTIVATED), BORDETELLA PERTUSSIS PERTACTIN ANTIGEN, AND BORDETELLA PERTUSSIS FIMBRIAE 2/3 ANTIGEN 0.5 ML: 5; 2; 2.5; 5; 3; 5 INJECTION, SUSPENSION INTRAMUSCULAR at 09:08

## 2017-08-25 NOTE — ED PROVIDER NOTES
Encounter Date: 2017    SCRIBE #1 NOTE: I, Alton Carpenter, am scribing for, and in the presence of,  Jose Hernández NP. I have scribed the following portions of the note - Other sections scribed: HPI and ROS.       History     Chief Complaint   Patient presents with    Laceration     finger laceration on left hand with scissors; small cut on pointer finger     Chief Complaint: Laceration    HPI: This 37 y.o. Female with HTN and arthritis presents to the ED secondary to a the left 2nd finger. 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 Pain is moderate.  She attempted no treatments prior to arrival.  Bleeding controlled PTA.  Her last tetanus shot was in .      The history is provided by the patient. No  was used.     Review of patient's allergies indicates:  No Known Allergies  Past Medical History:   Diagnosis Date    Arthritis     Hypertension      Past Surgical History:   Procedure Laterality Date     SECTION, LOW TRANSVERSE  2010    TUBAL LIGATION           Family History   Problem Relation Age of Onset    Hypertension Mother      Social History   Substance Use Topics    Smoking status: Former Smoker    Smokeless tobacco: Never Used    Alcohol use No     Review of Systems   Constitutional: Negative for chills and fever.   HENT: Negative for congestion and ear pain.    Eyes: Negative for pain and visual disturbance.   Musculoskeletal: Negative for back pain and neck pain.   Skin: Positive for wound (laceration). Negative for rash.   Neurological: Negative for weakness and numbness.   Psychiatric/Behavioral: Negative for confusion.       Physical Exam     Initial Vitals [17 0821]   BP Pulse Resp Temp SpO2   (!) 166/86 64 20 99 °F (37.2 °C) 100 %      MAP       112.67         Physical Exam    Nursing note and vitals reviewed.  Constitutional: She appears well-developed and well-nourished. She is not diaphoretic. She  is cooperative.  Non-toxic appearance. No distress.   HENT:   Head: Normocephalic and atraumatic.   Right Ear: External ear normal.   Left Ear: External ear normal.   Eyes: Conjunctivae and EOM are normal.   Neck: Normal range of motion. No tracheal deviation present.   Cardiovascular: Normal rate and regular rhythm.   Pulses:       Radial pulses are 2+ on the right side, and 2+ on the left side.   Pulmonary/Chest: Effort normal. No stridor. No tachypnea and no bradypnea. No respiratory distress.   Musculoskeletal:        Right hand: She exhibits normal capillary refill. Decreased sensation is not present in the ulnar distribution, is not present in the medial distribution and is not present in the radial distribution. Right index finger: Injuries: laceration. Motor /Testing: Wrist Extension: 5/5. Wrist Flexion: 5/5. : 5/5. Index Finger: 5/5. Middle Finger: 5/5. Ring Finger: 5/5. Little Finger: 5/5. Thumb APB: 5/5. Thumb FPL: 5/5. Pinch Mechanism: 5/5.        Hands:  Neurological: She is alert and oriented to person, place, and time. She has normal strength.   Skin: Skin is warm and dry. Capillary refill takes less than 2 seconds. Laceration (left index finger) noted. No rash noted. No cyanosis or erythema. Nails show no clubbing.   Psychiatric: She has a normal mood and affect. Her behavior is normal. Judgment and thought content normal.         ED Course   Lac Repair  Date/Time: 8/25/2017 9:04 AM  Performed by: MY ANGELES  Authorized by: MY ANGELES   Body area: upper extremity  Location details: left index finger  Laceration length: 1 cm  Foreign bodies: no foreign bodies  Tendon involvement: none  Nerve involvement: none  Irrigation solution: saline  Irrigation method: syringe  Amount of cleaning: standard  Debridement: none  Degree of undermining: none  Skin closure: glue  Approximation: close  Approximation difficulty: simple  Patient tolerance: Patient tolerated the procedure well with no  immediate complications  Comments: Glued with the finger in flexion. Well approximated.        Labs Reviewed   POCT URINE PREGNANCY                   APC / Resident Notes:   This is an evaluation of a 37 y.o. female that presents to the Emergency Department for a Laceration. Physical Exam shows a non-toxic, afebrile, and well appearing female. There is a superficial laceration over the PIP of the left index finger. There is no surrounding erythema or area of increased warmth.  2 second capillary refill.  There is full ROM of the finger at the MCP, PIP, DIP joints against resistance. Equal sensation to the extremity. No visible tendon lacerations observed on exam.  Tetanus is up to date. The wound was irrigated and visually inspected prior to closure. No visible foreign bodies noted. Vital Signs Are Reassuring. The wound was closed per the procedure note.     My overall impression is Laceration. I considered, but at this time, do not suspect cellulitis, compartment syndrome, underlying fracture, tendon injury, or suspect any retained foreign body at this time.     Tdap given in the ER. D/C Information: Laceration/Wound Care/Glue instructions given. The diagnosis, treatment plan, instructions for follow-up and reevaluation with her PCP PRN as well as ED return precautions were discussed and understanding was verbalized. All questions or concerns have been addressed. This case was discussed with Dr. Corona who is in agreement with my assessment and plan. CORINA Goode, FNP-C        Scribe Attestation:   Scribe #1: I performed the above scribed service and the documentation accurately describes the services I performed. I attest to the accuracy of the note.    Attending Attestation:           Physician Attestation for Scribe:  Physician Attestation Statement for Scribe #1: I, Jose Hernández NP, reviewed documentation, as scribed by Alton Carpenter in my presence, and it is both accurate and complete.                  ED Course     Clinical Impression:   The primary encounter diagnosis was Laceration of left index finger without foreign body without damage to nail, initial encounter. A diagnosis of Need for tetanus booster was also pertinent to this visit.    Disposition:   Disposition: Discharged  Condition: Stable                        GIGI Salas  08/25/17 0940

## 2017-08-25 NOTE — ED TRIAGE NOTES
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

## 2017-08-25 NOTE — DISCHARGE INSTRUCTIONS
Please keep your wound clean and dry.  Wash gently with soap and water. Please watch for signs of infection including: increased\spreading redness, swelling, pus-like discharge, or a fever greater than 100.4F. If you experience any of these, please contact your Primary Care Doctor or Return to the Emergency Department for a wound check.

## 2017-09-19 ENCOUNTER — HOSPITAL ENCOUNTER (EMERGENCY)
Facility: HOSPITAL | Age: 37
Discharge: HOME | End: 2017-09-19
Attending: EMERGENCY MEDICINE
Payer: MEDICAID

## 2017-09-19 VITALS
OXYGEN SATURATION: 100 % | RESPIRATION RATE: 20 BRPM | TEMPERATURE: 98 F | SYSTOLIC BLOOD PRESSURE: 154 MMHG | WEIGHT: 293 LBS | HEIGHT: 72 IN | DIASTOLIC BLOOD PRESSURE: 74 MMHG | BODY MASS INDEX: 39.68 KG/M2 | HEART RATE: 68 BPM

## 2017-09-19 DIAGNOSIS — M25.562 LEFT KNEE PAIN: ICD-10-CM

## 2017-09-19 DIAGNOSIS — M17.12 OSTEOARTHRITIS OF LEFT KNEE, UNSPECIFIED OSTEOARTHRITIS TYPE: Primary | ICD-10-CM

## 2017-09-19 PROCEDURE — 25000003 PHARM REV CODE 250: Performed by: PHYSICIAN ASSISTANT

## 2017-09-19 PROCEDURE — 99283 EMERGENCY DEPT VISIT LOW MDM: CPT

## 2017-09-19 RX ORDER — IBUPROFEN 600 MG/1
600 TABLET ORAL EVERY 6 HOURS PRN
Qty: 20 TABLET | Refills: 0 | Status: SHIPPED | OUTPATIENT
Start: 2017-09-19 | End: 2018-08-20

## 2017-09-19 RX ORDER — ACETAMINOPHEN 500 MG
1000 TABLET ORAL
Status: COMPLETED | OUTPATIENT
Start: 2017-09-19 | End: 2017-09-19

## 2017-09-19 RX ADMIN — ACETAMINOPHEN 1000 MG: 500 TABLET ORAL at 10:09

## 2017-09-19 NOTE — DISCHARGE INSTRUCTIONS
Take medications as prescribed.  Follow-up with primary care physician for reevaluation.  Return to this ED if any problems occur.
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
## 2017-09-19 NOTE — ED TRIAGE NOTES
Patient came in PER personal transport with c/o LEFT sided leg pain around the knee and is getting worse.

## 2017-11-19 ENCOUNTER — HOSPITAL ENCOUNTER (EMERGENCY)
Facility: HOSPITAL | Age: 37
Discharge: HOME | End: 2017-11-19
Attending: EMERGENCY MEDICINE
Payer: MEDICAID

## 2017-11-19 VITALS
SYSTOLIC BLOOD PRESSURE: 180 MMHG | HEART RATE: 84 BPM | BODY MASS INDEX: 39.68 KG/M2 | RESPIRATION RATE: 20 BRPM | OXYGEN SATURATION: 100 % | DIASTOLIC BLOOD PRESSURE: 92 MMHG | TEMPERATURE: 98 F | HEIGHT: 72 IN | WEIGHT: 293 LBS

## 2017-11-19 DIAGNOSIS — M79.605 ACUTE PAIN OF LEFT LOWER EXTREMITY: ICD-10-CM

## 2017-11-19 DIAGNOSIS — M25.562 POSTERIOR KNEE PAIN, LEFT: Primary | ICD-10-CM

## 2017-11-19 LAB
B-HCG UR QL: NEGATIVE
CTP QC/QA: YES

## 2017-11-19 PROCEDURE — 99284 EMERGENCY DEPT VISIT MOD MDM: CPT | Mod: 25

## 2017-11-19 PROCEDURE — 81025 URINE PREGNANCY TEST: CPT | Performed by: EMERGENCY MEDICINE

## 2017-11-19 RX ORDER — MELOXICAM 7.5 MG/1
7.5 TABLET ORAL DAILY
Qty: 12 TABLET | Refills: 0 | Status: SHIPPED | OUTPATIENT
Start: 2017-11-19 | End: 2018-08-20
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
## 2018-03-22 ENCOUNTER — HOSPITAL ENCOUNTER (OUTPATIENT)
Facility: HOSPITAL | Age: 38
LOS: 1 days | Discharge: HOME | End: 2018-03-23
Attending: EMERGENCY MEDICINE | Admitting: EMERGENCY MEDICINE
Payer: MEDICAID

## 2018-03-22 DIAGNOSIS — R07.9 CHEST PAIN: ICD-10-CM

## 2018-03-22 DIAGNOSIS — I82.409 DVT (DEEP VENOUS THROMBOSIS): ICD-10-CM

## 2018-03-22 DIAGNOSIS — E66.9 OBESITY, UNSPECIFIED CLASSIFICATION, UNSPECIFIED OBESITY TYPE, UNSPECIFIED WHETHER SERIOUS COMORBIDITY PRESENT: ICD-10-CM

## 2018-03-22 DIAGNOSIS — I82.432 ACUTE DEEP VEIN THROMBOSIS (DVT) OF POPLITEAL VEIN OF LEFT LOWER EXTREMITY: Primary | ICD-10-CM

## 2018-03-22 LAB
ALBUMIN SERPL BCP-MCNC: 3.2 G/DL
ALP SERPL-CCNC: 62 U/L
ALT SERPL W/O P-5'-P-CCNC: 11 U/L
ANION GAP SERPL CALC-SCNC: 7 MMOL/L
AST SERPL-CCNC: 12 U/L
B-HCG UR QL: NEGATIVE
BASOPHILS # BLD AUTO: 0.01 K/UL
BASOPHILS NFR BLD: 0.3 %
BILIRUB SERPL-MCNC: 0.4 MG/DL
BILIRUB UR QL STRIP: NEGATIVE
BNP SERPL-MCNC: 69 PG/ML
BUN SERPL-MCNC: 7 MG/DL
CALCIUM SERPL-MCNC: 8.5 MG/DL
CHLORIDE SERPL-SCNC: 109 MMOL/L
CLARITY UR: CLEAR
CO2 SERPL-SCNC: 25 MMOL/L
COLOR UR: YELLOW
CREAT SERPL-MCNC: 0.8 MG/DL
CTP QC/QA: YES
D DIMER PPP IA.FEU-MCNC: 1 MG/L FEU
DIFFERENTIAL METHOD: ABNORMAL
EOSINOPHIL # BLD AUTO: 0 K/UL
EOSINOPHIL NFR BLD: 0.9 %
ERYTHROCYTE [DISTWIDTH] IN BLOOD BY AUTOMATED COUNT: 15.4 %
EST. GFR  (AFRICAN AMERICAN): >60 ML/MIN/1.73 M^2
EST. GFR  (NON AFRICAN AMERICAN): >60 ML/MIN/1.73 M^2
GLUCOSE SERPL-MCNC: 89 MG/DL
GLUCOSE UR QL STRIP: NEGATIVE
HCT VFR BLD AUTO: 30.2 %
HGB BLD-MCNC: 9.5 G/DL
HGB UR QL STRIP: NEGATIVE
KETONES UR QL STRIP: NEGATIVE
LEUKOCYTE ESTERASE UR QL STRIP: NEGATIVE
LYMPHOCYTES # BLD AUTO: 1.6 K/UL
LYMPHOCYTES NFR BLD: 46.4 %
MCH RBC QN AUTO: 26.9 PG
MCHC RBC AUTO-ENTMCNC: 31.5 G/DL
MCV RBC AUTO: 86 FL
MONOCYTES # BLD AUTO: 0.3 K/UL
MONOCYTES NFR BLD: 7.4 %
NEUTROPHILS # BLD AUTO: 1.6 K/UL
NEUTROPHILS NFR BLD: 45 %
NITRITE UR QL STRIP: NEGATIVE
PH UR STRIP: 7 [PH] (ref 5–8)
PLATELET # BLD AUTO: 194 K/UL
PMV BLD AUTO: 10.3 FL
POTASSIUM SERPL-SCNC: 3.7 MMOL/L
PROT SERPL-MCNC: 6.8 G/DL
PROT UR QL STRIP: NEGATIVE
RBC # BLD AUTO: 3.53 M/UL
SODIUM SERPL-SCNC: 141 MMOL/L
SP GR UR STRIP: 1.02 (ref 1–1.03)
TROPONIN I SERPL DL<=0.01 NG/ML-MCNC: <0.006 NG/ML
URN SPEC COLLECT METH UR: ABNORMAL
UROBILINOGEN UR STRIP-ACNC: ABNORMAL EU/DL
WBC # BLD AUTO: 3.49 K/UL

## 2018-03-22 PROCEDURE — 96372 THER/PROPH/DIAG INJ SC/IM: CPT

## 2018-03-22 PROCEDURE — 63600175 PHARM REV CODE 636 W HCPCS: Performed by: HOSPITALIST

## 2018-03-22 PROCEDURE — 63600175 PHARM REV CODE 636 W HCPCS: Performed by: EMERGENCY MEDICINE

## 2018-03-22 PROCEDURE — 84484 ASSAY OF TROPONIN QUANT: CPT

## 2018-03-22 PROCEDURE — 93010 ELECTROCARDIOGRAM REPORT: CPT | Mod: ,,, | Performed by: INTERNAL MEDICINE

## 2018-03-22 PROCEDURE — 80053 COMPREHEN METABOLIC PANEL: CPT

## 2018-03-22 PROCEDURE — 99285 EMERGENCY DEPT VISIT HI MDM: CPT | Mod: 25

## 2018-03-22 PROCEDURE — 93005 ELECTROCARDIOGRAM TRACING: CPT

## 2018-03-22 PROCEDURE — 81025 URINE PREGNANCY TEST: CPT | Performed by: EMERGENCY MEDICINE

## 2018-03-22 PROCEDURE — 81003 URINALYSIS AUTO W/O SCOPE: CPT

## 2018-03-22 PROCEDURE — 85379 FIBRIN DEGRADATION QUANT: CPT

## 2018-03-22 PROCEDURE — 25000003 PHARM REV CODE 250: Performed by: HOSPITALIST

## 2018-03-22 PROCEDURE — 85025 COMPLETE CBC W/AUTO DIFF WBC: CPT

## 2018-03-22 PROCEDURE — G0378 HOSPITAL OBSERVATION PER HR: HCPCS

## 2018-03-22 PROCEDURE — 25500020 PHARM REV CODE 255: Performed by: EMERGENCY MEDICINE

## 2018-03-22 PROCEDURE — 21400001 HC TELEMETRY ROOM

## 2018-03-22 PROCEDURE — 25000003 PHARM REV CODE 250: Performed by: EMERGENCY MEDICINE

## 2018-03-22 PROCEDURE — 83880 ASSAY OF NATRIURETIC PEPTIDE: CPT

## 2018-03-22 RX ORDER — ENOXAPARIN SODIUM 150 MG/ML
150 INJECTION SUBCUTANEOUS
Status: DISCONTINUED | OUTPATIENT
Start: 2018-03-22 | End: 2018-03-23 | Stop reason: HOSPADM

## 2018-03-22 RX ORDER — ENOXAPARIN SODIUM 100 MG/ML
150 INJECTION SUBCUTANEOUS
Status: DISCONTINUED | OUTPATIENT
Start: 2018-03-22 | End: 2018-03-22

## 2018-03-22 RX ORDER — ONDANSETRON 2 MG/ML
4 INJECTION INTRAMUSCULAR; INTRAVENOUS EVERY 8 HOURS PRN
Status: DISCONTINUED | OUTPATIENT
Start: 2018-03-22 | End: 2018-03-23

## 2018-03-22 RX ORDER — NITROGLYCERIN 0.4 MG/1
0.4 TABLET SUBLINGUAL
Status: COMPLETED | OUTPATIENT
Start: 2018-03-22 | End: 2018-03-22

## 2018-03-22 RX ORDER — ASPIRIN 325 MG
325 TABLET, DELAYED RELEASE (ENTERIC COATED) ORAL DAILY
Status: DISCONTINUED | OUTPATIENT
Start: 2018-03-23 | End: 2018-03-23

## 2018-03-22 RX ORDER — LOSARTAN POTASSIUM AND HYDROCHLOROTHIAZIDE 12.5; 5 MG/1; MG/1
2 TABLET ORAL DAILY
Status: DISCONTINUED | OUTPATIENT
Start: 2018-03-23 | End: 2018-03-22

## 2018-03-22 RX ORDER — ASPIRIN 325 MG
325 TABLET ORAL
Status: COMPLETED | OUTPATIENT
Start: 2018-03-22 | End: 2018-03-22

## 2018-03-22 RX ORDER — ENOXAPARIN SODIUM 100 MG/ML
100 INJECTION SUBCUTANEOUS
Status: DISCONTINUED | OUTPATIENT
Start: 2018-03-22 | End: 2018-03-22

## 2018-03-22 RX ORDER — LOSARTAN POTASSIUM AND HYDROCHLOROTHIAZIDE 12.5; 5 MG/1; MG/1
2 TABLET ORAL DAILY
Status: DISCONTINUED | OUTPATIENT
Start: 2018-03-22 | End: 2018-03-23 | Stop reason: HOSPADM

## 2018-03-22 RX ADMIN — NITROGLYCERIN 0.4 MG: 0.4 TABLET SUBLINGUAL at 01:03

## 2018-03-22 RX ADMIN — LOSARTAN POTASSIUM AND HYDROCHLOROTHIAZIDE 2 TABLET: 12.5; 5 TABLET ORAL at 06:03

## 2018-03-22 RX ADMIN — ENOXAPARIN SODIUM 150 MG: 150 INJECTION SUBCUTANEOUS at 06:03

## 2018-03-22 RX ADMIN — IOHEXOL 75 ML: 350 INJECTION, SOLUTION INTRAVENOUS at 03:03

## 2018-03-22 RX ADMIN — ASPIRIN 325 MG ORAL TABLET 325 MG: 325 PILL ORAL at 01:03

## 2018-03-22 NOTE — PLAN OF CARE
Patient from home with dependent children.  Independent and had no DME.  Patient prefers morning appointments with and preferred pharmacy is [facility].  Patient will have one person available to help at home.       03/22/18 1514   Discharge Assessment   Assessment Type Discharge Planning Assessment   Confirmed/corrected address and phone number on facesheet? Yes   Assessment information obtained from? Patient   Prior to hospitilization cognitive status: Alert/Oriented   Prior to hospitalization functional status: Independent   Current cognitive status: Alert/Oriented   Current Functional Status: Independent   Facility Arrived From: home   Lives With child(bonny), dependent   Able to Return to Prior Arrangements yes   Who are your caregiver(s) and their phone number(s)? Mother:  Negra Eller:  [phone]   Patient's perception of discharge disposition home or selfcare   Readmission Within The Last 30 Days no previous admission in last 30 days   Patient currently being followed by outpatient case management? No   Patient currently receives any other outside agency services? No   Equipment Currently Used at Home none   Do you have any problems affording any of your prescribed medications? No   Is the patient taking medications as prescribed? yes   Does the patient have transportation home? Yes   Transportation Available car   Does the patient receive services at the [facility]? No   Discharge Plan A Home   Discharge Plan B Home   Patient/Family In Agreement With Plan yes   Irena Wen LMSW, PO-AL, CCM  3/22/2018

## 2018-03-22 NOTE — ED NOTES
Pt return to ED approximately 1225 in stable condition.  Nitroglycerin SL given.  Will continue to monitor.

## 2018-03-22 NOTE — ED PROVIDER NOTES
"Encounter Date: 3/22/2018    SCRIBE #1 NOTE: I, Cecy Louis, am scribing for, and in the presence of,  Lorrie Chase MD. I have scribed the following portions of the note - Other sections scribed: HPI, ROS, PE, EKG.       History     Chief Complaint   Patient presents with    Leg Swelling     Bilateral lower leg swelling x 3 weeks.  Pain to right posterior leg.  Schedulded for echo.  C/o intermittent SOB.     CC: Leg Swelling    HPI: This 37 y.o. female with a medical history of hypertension and arthritis presents to the ED c/o constant, moderate (7/10) bilateral leg swelling and right leg pain (from the thigh radiating down the back of the leg) for the past 3x weeks. Pt reports that she has also been experiencing "a little" chest pain as well as shortness of breath. She states that she was recently evaluated by her PCP, who sent her for "cardiac" testing with normal results. Pt denies headache, abdominal pain and dysuria. No other associated symptoms. No alleviating factors.          The history is provided by the patient. No  was used.     Review of patient's allergies indicates:  No Known Allergies  Past Medical History:   Diagnosis Date    Arthritis     Hypertension      Past Surgical History:   Procedure Laterality Date     SECTION, LOW TRANSVERSE  2010    TUBAL LIGATION           Family History   Problem Relation Age of Onset    Hypertension Mother      Social History   Substance Use Topics    Smoking status: Former Smoker    Smokeless tobacco: Never Used    Alcohol use 0.6 oz/week     1 Glasses of wine per week     Review of Systems   Constitutional: Negative for activity change, chills and fever.   HENT: Negative for congestion, ear pain, rhinorrhea and sore throat.    Eyes: Negative for pain and visual disturbance.   Respiratory: Positive for shortness of breath. Negative for cough.    Cardiovascular: Positive for chest pain and leg swelling (bilateral). "   Gastrointestinal: Negative for abdominal pain, diarrhea, nausea and vomiting.   Genitourinary: Negative for dysuria, flank pain, frequency and urgency.   Musculoskeletal: Negative for back pain and neck pain.        (+) right leg pain (from the thigh radiating down the back of the leg)   Skin: Negative for rash.   Neurological: Negative for headaches.   Psychiatric/Behavioral: Negative for agitation.   All other systems reviewed and are negative.      Physical Exam     Initial Vitals [03/22/18 1046]   BP Pulse Resp Temp SpO2   (!) 165/105 71 18 98.6 °F (37 °C) 100 %      MAP       125         Physical Exam    Nursing note and vitals reviewed.  Constitutional: She appears well-developed and well-nourished.  Non-toxic appearance. She does not appear ill.   Morbid Obesity.   HENT:   Head: Normocephalic and atraumatic.   Eyes: Conjunctivae and EOM are normal. Pupils are equal, round, and reactive to light.   Neck: Normal range of motion. Neck supple.   Cardiovascular: Normal rate and regular rhythm.   Pulmonary/Chest: Effort normal and breath sounds normal. No respiratory distress. She has no wheezes. She has no rhonchi. She has no rales.   Abdominal: Soft. Normal appearance and bowel sounds are normal. She exhibits no distension. There is no tenderness. There is no guarding.   Musculoskeletal: Normal range of motion.   There is trace edema present to the bilateral lower extremities. There is also calf tenderness.    Neurological: She is alert and oriented to person, place, and time. She has normal strength.   Skin: Skin is warm and dry. Capillary refill takes less than 2 seconds.   Psychiatric: She has a normal mood and affect.         ED Course   Procedures  Labs Reviewed   CBC W/ AUTO DIFFERENTIAL - Abnormal; Notable for the following:        Result Value    WBC 3.49 (*)     RBC 3.53 (*)     Hemoglobin 9.5 (*)     Hematocrit 30.2 (*)     MCH 26.9 (*)     MCHC 31.5 (*)     RDW 15.4 (*)     Gran # (ANC) 1.6 (*)      All other components within normal limits   COMPREHENSIVE METABOLIC PANEL - Abnormal; Notable for the following:     Calcium 8.5 (*)     Albumin 3.2 (*)     Anion Gap 7 (*)     All other components within normal limits   D DIMER, QUANTITATIVE - Abnormal; Notable for the following:     D-Dimer 1.00 (*)     All other components within normal limits   URINALYSIS - Abnormal; Notable for the following:     Urobilinogen, UA 4.0-6.0 (*)     All other components within normal limits   TROPONIN I   TROPONIN I   B-TYPE NATRIURETIC PEPTIDE   TROPONIN I   POCT URINE PREGNANCY     Imaging Results          CTA Chest Non-Coronary (PE Study) (Final result)  Result time 03/22/18 16:17:17    Final result by Kip Vega MD (03/22/18 16:17:17)                 Impression:      No CTA evidence of pulmonary embolus with no acute findings within the thorax.      Electronically signed by: Kip Vega MD  Date:    03/22/2018  Time:    16:17             Narrative:    EXAMINATION:  CTA CHEST NON CORONARY    CLINICAL HISTORY:  Chest pain, acute, PE suspected, intermed prob, positive D-dimer;    TECHNIQUE:  Low dose axial images, sagittal and coronal reformations were obtained from the thoracic inlet to the lung bases following the IV administration of 100 mL of Omnipaque 350.  Contrast timing was optimized to evaluate the pulmonary arteries.  MIP images were performed.    COMPARISON:  No prior CTs of the chest are available.  Correlation is made with prior chest radiographs, the most recent of which is dated 22 March 2018.    FINDINGS:  There is contrast enhancement of the main pulmonary artery, its main branches as well as its the lobar and segmental branches bilaterally.  There is also enhancement of the included aorta which is unremarkable in CT appearance.    Parenchymal window settings demonstrate no nodules or lesions, areas of infiltrate or cystic air spaces and there is no pleural effusion.  The trachea and bronchial airways  are unremarkable in CT appearance.  There is unremarkable CT appearance of the mediastinum and hilar regions.    The included osseous structures are unremarkable in CT appearance.  Limited evaluation of the included upper abdomen demonstrates no significant findings.                               US Lower Extremity Veins Bilateral (Final result)  Result time 03/22/18 13:06:12    Final result by Ralph Salvador MD (03/22/18 13:06:12)                 Impression:      1. New DVT left posterior tibial vein only.  Two.  Otherwise no evidence of deep venous thrombosis in either lower extremity.    Three.  Left popliteal space Baker's cyst.      Electronically signed by: Ralph Salvador MD  Date:    03/22/2018  Time:    13:06             Narrative:    EXAMINATION:  US LOWER EXTREMITY VEINS BILATERAL    CLINICAL HISTORY:  Acute embolism and thrombosis of unspecified deep veins of unspecified lower extremity    TECHNIQUE:  Duplex and color flow Doppler and dynamic compression was performed of the bilateral lower extremity veins was performed.    COMPARISON:  Two thousand seventeen    FINDINGS:  Right thigh veins: The common femoral, femoral, popliteal, upper greater saphenous, and deep femoral veins are patent and free of thrombus. The veins are normally compressible and have normal phasic flow and augmentation response.    Right calf veins: The visualized calf veins are patent.    Left thigh veins: The common femoral, femoral, popliteal, upper greater saphenous, and deep femoral veins are patent and free of thrombus. The veins are normally compressible and have normal phasic flow and augmentation response.    Left calf veins: Left posterior tibial vein thrombosis, otherwise the visualized calf veins are patent.    Miscellaneous: Baker's cyst left popliteal space 4.2 x 1.7 x 3.4 cm.                               X-Ray Chest AP Portable (Final result)  Result time 03/22/18 11:48:05    Final result by Kip SMITH  MD Gary (03/22/18 11:48:05)                 Impression:      Single view of the chest demonstrating no evidence of acute cardiac pulmonic process.      Electronically signed by: Kip Vega MD  Date:    03/22/2018  Time:    11:48             Narrative:    EXAMINATION:  XR CHEST AP PORTABLE    CLINICAL HISTORY:  Chest Pain;    TECHNIQUE:  Single frontal view of the chest was performed.    COMPARISON:  Chest two views dated 31 March 2017    FINDINGS:  The cardiac silhouette has a prominent appearance on this view likely due to magnification.  There is otherwise stable and unremarkable radiographic appearance of the cardiomediastinal shadow and the hilar regions.  The lungs appear expanded and clear.  The hemidiaphragms are sharply outlined and there is no evidence of pleural effusion.  Monitor leads are in place.                                EKG Readings: (Independently Interpreted)   Initial Reading: No STEMI. Rhythm: Sinus Arrhythmia. Heart Rate: 80.   Normal intervals. Compared to 5/8/16, there are no significant changes.           Medical Decision Making:   Differential Diagnosis:   DVT.  PE.  CHF.  Other:   I have discussed this case with another health care provider.       <> Summary of the Discussion: Patient was discussed with Dr. Uziel Hill.  He will admit the patient for further evaluation and management.            Scribe Attestation:   Scribe #1: I performed the above scribed service and the documentation accurately describes the services I performed. I attest to the accuracy of the note.    Attending Attestation:           Physician Attestation for Scribe:  Physician Attestation Statement for Scribe #1: I, Lorrie Chase MD, reviewed documentation, as scribed by Cecy Louis in my presence, and it is both accurate and complete.     Comments: I, Dr. Chase, personally performed the services described in this documentation. All medical record entries made by the scribe were at my  direction and in my presence.  I have reviewed the chart and agree that the record reflects my personal performance and is accurate and complete.                 Clinical Impression:   The primary encounter diagnosis was Acute deep vein thrombosis (DVT) of popliteal vein of left lower extremity. Diagnoses of Chest pain, DVT (deep venous thrombosis), and Obesity, unspecified classification, unspecified obesity type, unspecified whether serious comorbidity present were also pertinent to this visit.    Disposition:   Disposition: Admitted  Condition: Stable                        Lorrie Chase MD  03/23/18 0944

## 2018-03-22 NOTE — ED TRIAGE NOTES
"Pt states " I been having pain to rt lower leg x 2 weeks, I went to cardiology and they say everything is fine."    "

## 2018-03-23 VITALS
RESPIRATION RATE: 14 BRPM | OXYGEN SATURATION: 100 % | SYSTOLIC BLOOD PRESSURE: 144 MMHG | DIASTOLIC BLOOD PRESSURE: 77 MMHG | TEMPERATURE: 98 F | HEART RATE: 63 BPM | BODY MASS INDEX: 39.68 KG/M2 | WEIGHT: 293 LBS | HEIGHT: 72 IN

## 2018-03-23 PROBLEM — E66.01 MORBID OBESITY WITH BMI OF 50.0-59.9, ADULT: Chronic | Status: ACTIVE | Noted: 2018-03-23

## 2018-03-23 PROBLEM — I82.402 ACUTE DEEP VEIN THROMBOSIS (DVT) OF LEFT LOWER EXTREMITY: Status: ACTIVE | Noted: 2018-03-22

## 2018-03-23 PROBLEM — I10 ESSENTIAL HYPERTENSION: Chronic | Status: ACTIVE | Noted: 2018-03-23

## 2018-03-23 PROBLEM — D63.8 ANEMIA OF CHRONIC DISEASE: Chronic | Status: ACTIVE | Noted: 2018-03-23

## 2018-03-23 LAB
ALBUMIN SERPL BCP-MCNC: 3.2 G/DL
ALP SERPL-CCNC: 51 U/L
ALT SERPL W/O P-5'-P-CCNC: 9 U/L
ANION GAP SERPL CALC-SCNC: 9 MMOL/L
AST SERPL-CCNC: 20 U/L
BASOPHILS # BLD AUTO: 0.01 K/UL
BASOPHILS NFR BLD: 0.2 %
BILIRUB SERPL-MCNC: 0.6 MG/DL
BUN SERPL-MCNC: 6 MG/DL
CALCIUM SERPL-MCNC: 8.5 MG/DL
CHLORIDE SERPL-SCNC: 108 MMOL/L
CO2 SERPL-SCNC: 19 MMOL/L
CREAT SERPL-MCNC: 0.7 MG/DL
DIFFERENTIAL METHOD: ABNORMAL
EOSINOPHIL # BLD AUTO: 0 K/UL
EOSINOPHIL NFR BLD: 0.9 %
ERYTHROCYTE [DISTWIDTH] IN BLOOD BY AUTOMATED COUNT: 15.4 %
EST. GFR  (AFRICAN AMERICAN): >60 ML/MIN/1.73 M^2
EST. GFR  (NON AFRICAN AMERICAN): >60 ML/MIN/1.73 M^2
GLUCOSE SERPL-MCNC: 79 MG/DL
HCT VFR BLD AUTO: 30.8 %
HGB BLD-MCNC: 10 G/DL
LYMPHOCYTES # BLD AUTO: 2.4 K/UL
LYMPHOCYTES NFR BLD: 53.2 %
MAGNESIUM SERPL-MCNC: 2 MG/DL
MCH RBC QN AUTO: 27.7 PG
MCHC RBC AUTO-ENTMCNC: 32.5 G/DL
MCV RBC AUTO: 85 FL
MONOCYTES # BLD AUTO: 0.6 K/UL
MONOCYTES NFR BLD: 12.7 %
NEUTROPHILS # BLD AUTO: 1.5 K/UL
NEUTROPHILS NFR BLD: 33 %
PHOSPHATE SERPL-MCNC: 4.3 MG/DL
PLATELET # BLD AUTO: 229 K/UL
PMV BLD AUTO: 10.4 FL
POTASSIUM SERPL-SCNC: 4.7 MMOL/L
PROT SERPL-MCNC: 6.7 G/DL
RBC # BLD AUTO: 3.61 M/UL
SODIUM SERPL-SCNC: 136 MMOL/L
WBC # BLD AUTO: 4.42 K/UL

## 2018-03-23 PROCEDURE — 25000003 PHARM REV CODE 250: Performed by: HOSPITALIST

## 2018-03-23 PROCEDURE — 25000003 PHARM REV CODE 250: Performed by: INTERNAL MEDICINE

## 2018-03-23 PROCEDURE — 83735 ASSAY OF MAGNESIUM: CPT

## 2018-03-23 PROCEDURE — 63600175 PHARM REV CODE 636 W HCPCS: Performed by: HOSPITALIST

## 2018-03-23 PROCEDURE — 85025 COMPLETE CBC W/AUTO DIFF WBC: CPT

## 2018-03-23 PROCEDURE — 80053 COMPREHEN METABOLIC PANEL: CPT

## 2018-03-23 PROCEDURE — 36415 COLL VENOUS BLD VENIPUNCTURE: CPT

## 2018-03-23 PROCEDURE — 94761 N-INVAS EAR/PLS OXIMETRY MLT: CPT

## 2018-03-23 PROCEDURE — G0378 HOSPITAL OBSERVATION PER HR: HCPCS

## 2018-03-23 PROCEDURE — 84100 ASSAY OF PHOSPHORUS: CPT

## 2018-03-23 RX ORDER — ONDANSETRON 2 MG/ML
8 INJECTION INTRAMUSCULAR; INTRAVENOUS EVERY 8 HOURS PRN
Status: DISCONTINUED | OUTPATIENT
Start: 2018-03-23 | End: 2018-03-23 | Stop reason: HOSPADM

## 2018-03-23 RX ORDER — LOSARTAN POTASSIUM AND HYDROCHLOROTHIAZIDE 25; 100 MG/1; MG/1
1 TABLET ORAL DAILY
Status: DISCONTINUED | OUTPATIENT
Start: 2018-03-23 | End: 2018-03-23

## 2018-03-23 RX ORDER — AMOXICILLIN 250 MG
1 CAPSULE ORAL 2 TIMES DAILY PRN
Status: DISCONTINUED | OUTPATIENT
Start: 2018-03-23 | End: 2018-03-23 | Stop reason: HOSPADM

## 2018-03-23 RX ORDER — ACETAMINOPHEN 500 MG
500 TABLET ORAL EVERY 6 HOURS PRN
Status: DISCONTINUED | OUTPATIENT
Start: 2018-03-23 | End: 2018-03-23 | Stop reason: HOSPADM

## 2018-03-23 RX ORDER — RAMELTEON 8 MG/1
8 TABLET ORAL NIGHTLY PRN
Status: DISCONTINUED | OUTPATIENT
Start: 2018-03-23 | End: 2018-03-23 | Stop reason: HOSPADM

## 2018-03-23 RX ORDER — CLONIDINE HYDROCHLORIDE 0.1 MG/1
0.1 TABLET ORAL 3 TIMES DAILY PRN
Status: DISCONTINUED | OUTPATIENT
Start: 2018-03-23 | End: 2018-03-23 | Stop reason: HOSPADM

## 2018-03-23 RX ORDER — OXYCODONE AND ACETAMINOPHEN 5; 325 MG/1; MG/1
1 TABLET ORAL EVERY 6 HOURS PRN
Status: DISCONTINUED | OUTPATIENT
Start: 2018-03-23 | End: 2018-03-23 | Stop reason: HOSPADM

## 2018-03-23 RX ADMIN — ENOXAPARIN SODIUM 150 MG: 150 INJECTION SUBCUTANEOUS at 06:03

## 2018-03-23 RX ADMIN — ACETAMINOPHEN 500 MG: 500 TABLET ORAL at 01:03

## 2018-03-23 RX ADMIN — OXYCODONE HYDROCHLORIDE AND ACETAMINOPHEN 1 TABLET: 5; 325 TABLET ORAL at 01:03

## 2018-03-23 RX ADMIN — OXYCODONE HYDROCHLORIDE AND ACETAMINOPHEN 1 TABLET: 5; 325 TABLET ORAL at 09:03

## 2018-03-23 RX ADMIN — LOSARTAN POTASSIUM AND HYDROCHLOROTHIAZIDE 2 TABLET: 12.5; 5 TABLET ORAL at 09:03

## 2018-03-23 NOTE — NURSING
In preparation for discharge, d/c'ed patient's saline lock, applied pressure to site, secured site with gauze and tape, no redness or swelling noted. D/C'ed patient's tele, SB prior to removal. Patient is sitting in room waiting for ride home and discharge instructions. NAD noted.

## 2018-03-23 NOTE — PLAN OF CARE
"TN met with patient to introduce self and explain role of case management. TN provided patient with "Discharg Planning Begins on Admission" and discussed " Help at Home". Patient stated that her mom is in town to assist her temporarily. Contact information added to white board.    TN also provided and explained "DVT discharge instructions" handout with patient using teach back. Patient stated that she will watch for redness, warmth, and swelling in her legs and will contact her doctor if it occurs. Patient is in agreement and verbalized an understanding. Placed discharge information in blue discharge folder. TN also reviewed patient responsibility checklist with her using teach back. Patient was able to verbalize her responsibilities after discharge to manage her care at home bein. Going to follow up appointments   2. Picking up rx from the pharmacy when discharged  3. Taking her medications as prescribed     Awaiting appointment scheduling with Geni.        18 1224   Final Note   Assessment Type Final Discharge Note   Discharge Disposition Home   What phone number can be called within the next 1-3 days to see how you are doing after discharge? ([phone])   Hospital Follow Up  Appt(s) scheduled? Yes   Discharge plans and expectations educations in teach back method with documentation complete? Yes   Right Care Referral Info   Post Acute Recommendation No Care           "

## 2018-03-23 NOTE — PROGRESS NOTES
WRITTEN HEALTHCARE DISCHARGE INFORMATION     Things that YOU are responsible for to Manage Your Care At Home:  1. Getting your prescriptions filled.  2. Taking you medications as directed. DO NOT MISS ANY DOSES!  3. Going to your follow-up doctor appointments. This is important because it allows the doctor to monitor your progress and to determine if any changes need to be made to your treatment plan.    If you are unable to make your follow up appointments, please call the number listed and reschedule this appointment.     After discharge, if you need assistance, you can call [facility] On Call Nurse [facility] Line for 24/7 assistance at [phone]    Thank you for choosing [facility] and allowing us to care for you.   From your care manager: Mariama FIERRO RN,TN  [phone]     You should receive a call from [facility] Discharge Department within 48-72 hours to help manage your care after discharge. Please try to make sure that you answer your phone for this important phone call.     Follow-up Information     [facility] Evin Kelly On 3/28/2018.    Why:  Outpatient Services, follow up with Colette Rincon at 1:30 PM  Contact information:  [address] [facility] [address] [phone]

## 2018-03-23 NOTE — HOSPITAL COURSE
Ms. Maryam Oneal is a 37 y.o. female with essential hypertension, anemia of chronic disease, and morbid obesity (BMI 51.2) who presents to [facility] ED with complaints of left  lower extremity pain of three weeks.  The pain is mainly to the calf area and is worse with movement; there is also increased swelling to both legs.  She also complains of shortness of breath and pleuritic chest pain that she describes as midsternal without radiation; it is worse on deep inspiration.  She denies any fevers or chills.  She has not had any coughing nor any hemoptysis, and denies any recent travel nor any trauma to her legs.  She has never had similar issues in the past.CTA chest show  no PE,she had normal cardiac marks and BNP and chest pain  Is resolved,she was stable on RA.she had left lower leg acute DVT,secondary to sedentary life style,she was started on Lovenox,her pain is improved,patient has been discharged home with Eliquis,side affect with possible irreversible bleeding explained,comparison with coumadin has been done,she understand consulting,she was agree with taking Eliquis,she will follow with PCP in next few days.

## 2018-03-23 NOTE — PLAN OF CARE
Problem: Patient Care Overview  Goal: Plan of Care Review  Outcome: Ongoing (interventions implemented as appropriate)   03/23/18 0957   Coping/Psychosocial   Plan Of Care Reviewed With patient       Problem: VTE, DVT and PE (Adult)  Goal: Signs and Symptoms of Listed Potential Problems Will be Absent, Minimized or Managed (VTE, DVT and PE)  Signs and symptoms of listed potential problems will be absent, minimized or managed by discharge/transition of care (reference VTE, DVT and PE (Adult) CPG).   Outcome: Ongoing (interventions implemented as appropriate)   03/23/18 0957   VTE, DVT and PE   Problems Assessed (VTE, DVT, PE) all   Problems Present (VTE, DVT, PE) deep vein thrombosis       Problem: Fall Risk (Adult)  Goal: Absence of Falls  Patient will demonstrate the desired outcomes by discharge/transition of care.   Outcome: Ongoing (interventions implemented as appropriate)   03/23/18 0957   Fall Risk (Adult)   Absence of Falls making progress toward outcome

## 2018-03-23 NOTE — MEDICAL/APP STUDENT
[facility]  History & Physical    Subjective:      Chief Complaint/Reason for Admission: Leg pain and swelling    Maryam Oneal is a 37 y.o. female for evaluation of leg pain with pmhx of HTN. She reports 3 weeks of right calf pain, described as heaviness, of moderate intensity, worse with exertion and unimproved with meloxicam. Patient also reports bilateral lower extremity swelling for the same period along with some mild increased warmth. She denies any trauma or rashes. Patient notes intermittent sternal chest pain without radiation over the last week along with palpitations. She also reports some mild dyspnea. Both her chest pain and dyspnea are without modifying factors and resolve spontaneously after a few minutes. She denies any fever, chills, diaphoresis, abdominal pain, N/V/D/C, or urinary symptoms. Patient denies any oral contraceptive use, recent travel, recent surgery or prolonged immobilization. She reports having 1 cigarette a week.     Past Medical History:   Diagnosis Date    Arthritis     Hypertension      Past Surgical History:   Procedure Laterality Date     SECTION, LOW TRANSVERSE  2010    TUBAL LIGATION           Family History   Problem Relation Age of Onset    Hypertension Mother      Social History   Substance Use Topics    Smoking status: Former Smoker    Smokeless tobacco: Never Used    Alcohol use No       PTA Medications   Medication Sig    ibuprofen (ADVIL,MOTRIN) 600 MG tablet Take 1 tablet (600 mg total) by mouth every 6 (six) hours as needed for Pain.    losartan-hydrochlorothiazide 100-25 mg (HYZAAR) 100-25 mg per tablet Take 1 tablet by mouth once daily.    meloxicam (MOBIC) 7.5 MG tablet Take 1 tablet (7.5 mg total) by mouth once daily.     Review of patient's allergies indicates:  No Known Allergies     Review of Systems   Constitutional: Negative for chills, fever, malaise/fatigue and weight loss.   HENT: Negative for ear pain,  hearing loss, sinus pain, sore throat and tinnitus.    Eyes: Negative for blurred vision, double vision and photophobia.   Respiratory: Positive for shortness of breath. Negative for cough, hemoptysis, sputum production and wheezing.    Cardiovascular: Positive for chest pain, palpitations, claudication and leg swelling.   Gastrointestinal: Negative for abdominal pain, blood in stool, constipation, diarrhea, nausea and vomiting.   Genitourinary: Negative for dysuria, frequency and hematuria.   Musculoskeletal: Negative for back pain, falls, joint pain and neck pain.   Skin: Negative for rash.   Neurological: Negative for dizziness, speech change, focal weakness, seizures, loss of consciousness and headaches.       Objective:      Vital Signs (Most Recent)  Temp: 97.4 °F (36.3 °C) (03/22/18 2345)  Pulse: 65 (03/22/18 2345)  Resp: 18 (03/22/18 2345)  BP: 133/79 (03/22/18 2345)  SpO2: 100 % (03/22/18 2345)    Vital Signs Range (Last 24H):  Temp:  [97.4 °F (36.3 °C)-98.6 °F (37 °C)]   Pulse:  [62-71]   Resp:  [17-18]   BP: (133-181)/()   SpO2:  [98 %-100 %]     Physical Exam   Constitutional: She is oriented to person, place, and time. She appears well-developed and well-nourished. No distress.   HENT:   Head: Normocephalic and atraumatic.   Mouth/Throat: Oropharynx is clear and moist.   Eyes: EOM are normal. Pupils are equal, round, and reactive to light.   Neck: Normal range of motion. Neck supple. No tracheal deviation present. No thyromegaly present.   Cardiovascular: Normal rate, regular rhythm, normal heart sounds and intact distal pulses.    No murmur heard.  Pulmonary/Chest: Effort normal and breath sounds normal. She has no wheezes. She exhibits no tenderness.   Abdominal: Soft. Bowel sounds are normal. She exhibits no distension. There is no tenderness.   Musculoskeletal: Normal range of motion. She exhibits edema and tenderness.   Lymphadenopathy:     She has no cervical adenopathy.   Neurological: She  is alert and oriented to person, place, and time.   Skin: Skin is warm and dry. No erythema.   Psychiatric: She has a normal mood and affect. Her behavior is normal.   Vitals reviewed.    Lab Results   Component Value Date    WBC 3.49 (L) 03/22/2018    HGB 9.5 (L) 03/22/2018    HCT 30.2 (L) 03/22/2018    MCV 86 03/22/2018     03/22/2018     Lab Results   Component Value Date    DDIMER 1.00 (H) 03/22/2018     CMP  Sodium   Date Value Ref Range Status   03/22/2018 141 136 - 145 mmol/L Final     Potassium   Date Value Ref Range Status   03/22/2018 3.7 3.5 - 5.1 mmol/L Final     Chloride   Date Value Ref Range Status   03/22/2018 109 95 - 110 mmol/L Final     CO2   Date Value Ref Range Status   03/22/2018 25 23 - 29 mmol/L Final     Glucose   Date Value Ref Range Status   03/22/2018 89 70 - 110 mg/dL Final     BUN, Bld   Date Value Ref Range Status   03/22/2018 7 6 - 20 mg/dL Final     Creatinine   Date Value Ref Range Status   03/22/2018 0.8 0.5 - 1.4 mg/dL Final     Calcium   Date Value Ref Range Status   03/22/2018 8.5 (L) 8.7 - 10.5 mg/dL Final     Total Protein   Date Value Ref Range Status   03/22/2018 6.8 6.0 - 8.4 g/dL Final     Albumin   Date Value Ref Range Status   03/22/2018 3.2 (L) 3.5 - 5.2 g/dL Final     Total Bilirubin   Date Value Ref Range Status   03/22/2018 0.4 0.1 - 1.0 mg/dL Final     Comment:     For infants and newborns, interpretation of results should be based  on gestational age, weight and in agreement with clinical  observations.  Premature Infant recommended reference ranges:  Up to 24 hours.............<8.0 mg/dL  Up to 48 hours............<12.0 mg/dL  3-5 days..................<15.0 mg/dL  6-29 days.................<15.0 mg/dL       Alkaline Phosphatase   Date Value Ref Range Status   03/22/2018 62 55 - 135 U/L Final     AST   Date Value Ref Range Status   03/22/2018 12 10 - 40 U/L Final     ALT   Date Value Ref Range Status   03/22/2018 11 10 - 44 U/L Final     Anion Gap   Date  Value Ref Range Status   03/22/2018 7 (L) 8 - 16 mmol/L Final     eGFR if    Date Value Ref Range Status   03/22/2018 >60 >60 mL/min/1.73 m^2 Final     eGFR if non    Date Value Ref Range Status   03/22/2018 >60 >60 mL/min/1.73 m^2 Final     Comment:     Calculation used to obtain the estimated glomerular filtration  rate (eGFR) is the CKD-EPI equation.        Troponin x2: Negative  BNP: 69  EKG: NSR, no ischemia    CXR: Single view of the chest demonstrating no evidence of acute cardiac pulmonic process.  US Le: 1. New DVT left posterior tibial vein only.  Two.  Otherwise no evidence of deep venous thrombosis in either lower extremity.  Three.  Left popliteal space Baker's cyst.  CTA: No CTA evidence of pulmonary embolus with no acute findings within the thorax.    Assessment:    38 y/o F with right calf pain and bilateral lower extremity swelling consistent with DVT      Plan:    1)Single Left DVT: Anticoagulation Lovenox.   Scheduled for echocardiogram next month  2) Lipid panels, cholesterol, TSH

## 2018-03-23 NOTE — PROGRESS NOTES
TN spoke with Jorge ([facility]) to inquire of insurance coverage for Eliquis 5mg tab for patient to take 2 tablets by mouth BID with supply of 28 tablets. Patient's insurance requires a prior authorization. Awaiting fax for prior auth.    11:40- Request for prior auth received. TN will send information to insurance company.     [facility] - [place], [place] - [address]  [address] [facility] [address]  Phone: [phone] Fax: [phone]    11:58- Spoke with Renita WYATT)  @ [phone] to receive prior auth for Eliquis 5mg. Auth approved.     Awaiting fax.     12:36- Pre auth fax received from [employer].    12:37- Pre auth faxed to [facility] @ [phone].

## 2018-03-23 NOTE — DISCHARGE SUMMARY
[facility] Hospital Medicine  Discharge Summary      Patient Name: Maryam Oneal  MRN: [id number]  Admission Date: 3/22/2018  Hospital Length of Stay: 1 days  Discharge Date and Time:  03/23/2018 11:56 AM  Attending Physician: Uziel Hill MD   Discharging Provider: Uziel Hill MD  Primary Care Provider: Mich Castle MD      HPI:   Ms. Maryam Oneal is a 37 y.o. female with essential hypertension, anemia of chronic disease, and morbid obesity (BMI 51.2) who presents to [facility] ED with complaints of right lower extremity pain of three weeks.  The pain is mainly to the calf area and is worse with movement; there is also increased swelling to both legs.  She also complains of shortness of breath and pleuritic chest pain that she describes as midsternal without radiation; it is worse on deep inspiration.  She denies any fevers or chills.  She has not had any coughing nor any hemoptysis, and denies any recent travel nor any trauma to her legs.  She has never had similar issues in the past.    * No surgery found *      Hospital Course:   Ms. Maryam Oneal is a 37 y.o. female with essential hypertension, anemia of chronic disease, and morbid obesity (BMI 51.2) who presents to [facility] ED with complaints of left  lower extremity pain of three weeks.  The pain is mainly to the calf area and is worse with movement; there is also increased swelling to both legs.  She also complains of shortness of breath and pleuritic chest pain that she describes as midsternal without radiation; it is worse on deep inspiration.  She denies any fevers or chills.  She has not had any coughing nor any hemoptysis, and denies any recent travel nor any trauma to her legs.  She has never had similar issues in the past.CTA chest show  no PE,she had normal cardiac marks and BNP and chest pain  Is resolved,she was stable on RA.she had left lower leg acute DVT,secondary to sedentary life style,she was started  on Lovenox,her pain is improved,patient has been discharged home with Eliquis,side affect with possible irreversible bleeding explained,comparison with coumadin has been done,she understand consulting,she was agree with taking Eliquis,she will follow with PCP in next few days.     Consults:     No new Assessment & Plan notes have been filed under this hospital service since the last note was generated.  Service: Hospital Medicine    Final Active Diagnoses:    Diagnosis Date Noted POA    PRINCIPAL PROBLEM:  Acute deep vein thrombosis (DVT) of left lower extremity [I82.402] 03/22/2018 Yes    Essential hypertension [I10] 03/23/2018 Yes     Chronic    Anemia of chronic disease [D63.8] 03/23/2018 Yes     Chronic    Morbid obesity with BMI of 50.0-59.9, adult [E66.01, Z68.43] 03/23/2018 Not Applicable     Chronic      Problems Resolved During this Admission:    Diagnosis Date Noted Date Resolved POA       Discharged Condition: stable    Disposition: Home or Self Care    Follow Up:  Follow-up Information     Mich Castle MD In 1 week.    Specialty:  Family Medicine  Contact information:  [address] [place] [address]  [phone]                 Patient Instructions:     Activity as tolerated         Significant Diagnostic Studies: Labs:   BMP:   Recent Labs  Lab 03/22/18  1135 03/23/18  0644   GLU 89 79    136   K 3.7 4.7    108   CO2 25 19*   BUN 7 6   CREATININE 0.8 0.7   CALCIUM 8.5* 8.5*   MG  --  2.0    and CBC   Recent Labs  Lab 03/22/18  1135 03/23/18  0644   WBC 3.49* 4.42   HGB 9.5* 10.0*   HCT 30.2* 30.8*    229     Radiology: Ultrasound: leg   CTA chest     Pending Diagnostic Studies:     None         Medications:  Reconciled Home Medications:   Current Discharge Medication List      START taking these medications    Details   !! apixaban 5 mg Tab Take 2 tablets (10 mg total) by mouth 2 (two) times daily.  Qty: 28 tablet, Refills: 0      !! apixaban 5 mg Tab  Take 1 tablet (5 mg total) by mouth 2 (two) times daily.  Qty: 60 tablet, Refills: 5       !! - Potential duplicate medications found. Please discuss with provider.      CONTINUE these medications which have NOT CHANGED    Details   ibuprofen (ADVIL,MOTRIN) 600 MG tablet Take 1 tablet (600 mg total) by mouth every 6 (six) hours as needed for Pain.  Qty: 20 tablet, Refills: 0      losartan-hydrochlorothiazide 100-25 mg (HYZAAR) 100-25 mg per tablet Take 1 tablet by mouth once daily.      meloxicam (MOBIC) 7.5 MG tablet Take 1 tablet (7.5 mg total) by mouth once daily.  Qty: 12 tablet, Refills: 0             Indwelling Lines/Drains at time of discharge:   Lines/Drains/Airways          No matching active lines, drains, or airways          Time spent on the discharge of patient: 30  minutes  Patient was seen and examined on the date of discharge and determined to be suitable for discharge.         Uziel Hill MD  Department of Hospital Medicine  [facility]

## 2018-03-23 NOTE — ASSESSMENT & PLAN NOTE
Patient's blood pressure is poorly-controlled; will continue home regimen of hydrochlorothiazide and losartan, and provide as-needed clonidine.

## 2018-03-23 NOTE — HPI
Ms. Maryam Oneal is a 37 y.o. female with essential hypertension, anemia of chronic disease, and morbid obesity (BMI 51.2) who presents to [facility] ED with complaints of right lower extremity pain of three weeks.  The pain is mainly to the calf area and is worse with movement; there is also increased swelling to both legs.  She also complains of shortness of breath and pleuritic chest pain that she describes as midsternal without radiation; it is worse on deep inspiration.  She denies any fevers or chills.  She has not had any coughing nor any hemoptysis, and denies any recent travel nor any trauma to her legs.  She has never had similar issues in the past.

## 2018-03-23 NOTE — SUBJECTIVE & OBJECTIVE
Past Medical History:   Diagnosis Date    Arthritis     Hypertension        Past Surgical History:   Procedure Laterality Date     SECTION, LOW TRANSVERSE  2010    TUBAL LIGATION             Review of patient's allergies indicates:  No Known Allergies    No current facility-administered medications on file prior to encounter.      Current Outpatient Prescriptions on File Prior to Encounter   Medication Sig    ibuprofen (ADVIL,MOTRIN) 600 MG tablet Take 1 tablet (600 mg total) by mouth every 6 (six) hours as needed for Pain.    losartan-hydrochlorothiazide 100-25 mg (HYZAAR) 100-25 mg per tablet Take 1 tablet by mouth once daily.    meloxicam (MOBIC) 7.5 MG tablet Take 1 tablet (7.5 mg total) by mouth once daily.     Family History     Problem Relation (Age of Onset)    Hypertension Mother        Social History Main Topics    Smoking status: Former Smoker    Smokeless tobacco: Never Used    Alcohol use 0.6 oz/week     1 Glasses of wine per week    Drug use: Yes     Types: Marijuana      Comment: last smoked a couple of days ago    Sexual activity: Not Currently     Review of Systems   Constitutional: Negative for activity change, appetite change, chills, diaphoresis, fatigue, fever and unexpected weight change.   HENT: Negative.    Eyes: Negative.    Respiratory: Negative for cough, chest tightness, shortness of breath and wheezing.    Cardiovascular: Negative for chest pain, palpitations and leg swelling.   Gastrointestinal: Negative for abdominal distention, abdominal pain, blood in stool, constipation, diarrhea, nausea and vomiting.   Genitourinary: Negative for dysuria and hematuria.   Musculoskeletal: Negative.    Skin: Negative.    Neurological: Negative for dizziness, seizures, syncope, weakness and light-headedness.   Psychiatric/Behavioral: Negative.      Objective:     Vital Signs (Most Recent):  Temp: 97.4 °F (36.3 °C) (18)  Pulse: 65 (18)  Resp: 18  (03/22/18 2345)  BP: 133/79 (03/22/18 2345)  SpO2: 100 % (03/22/18 2345) Vital Signs (24h Range):  Temp:  [97.4 °F (36.3 °C)-98.6 °F (37 °C)] 97.4 °F (36.3 °C)  Pulse:  [62-71] 65  Resp:  [17-18] 18  SpO2:  [98 %-100 %] 100 %  BP: (133-181)/() 133/79     Weight: (!) 190.8 kg (420 lb 10.2 oz)  Body mass index is 51.2 kg/m².    Physical Exam   Constitutional: She is oriented to person, place, and time. She appears well-developed and well-nourished. No distress.   morbidly obese   HENT:   Head: Normocephalic and atraumatic.   Right Ear: External ear normal.   Left Ear: External ear normal.   Nose: Nose normal.   Eyes: Right eye exhibits no discharge. Left eye exhibits no discharge.   Neck: Normal range of motion.   Cardiovascular: Normal rate, regular rhythm, normal heart sounds and intact distal pulses.  Exam reveals no gallop and no friction rub.    No murmur heard.  Pulmonary/Chest: Effort normal and breath sounds normal. No respiratory distress. She has no wheezes. She has no rales. She exhibits no tenderness.   Abdominal: Soft. Bowel sounds are normal. She exhibits no distension. There is no tenderness. There is no rebound and no guarding.   Musculoskeletal: Normal range of motion. She exhibits no edema.   Neurological: She is alert and oriented to person, place, and time.   Skin: Skin is warm and dry. She is not diaphoretic. No erythema.   Psychiatric: She has a normal mood and affect. Her behavior is normal. Judgment and thought content normal.   Nursing note and vitals reviewed.          Significant Labs: All pertinent labs within the past 24 hours have been reviewed.    Significant Imaging: I have reviewed and interpreted all pertinent imaging results/findings within the past 24 hours.

## 2018-03-23 NOTE — PLAN OF CARE
Problem: Fall Risk (Adult)  Intervention: Monitor/Assist with Self Care   03/22/18 2142 03/22/18 2144 03/23/18 0400   Functional Level Current   Ambulation --  0 - independent --    Transferring --  0 - independent --    Toileting --  0 - independent --    Bathing --  0 - independent --    Dressing --  0 - independent --    Eating --  0 - independent --    Communication --  0 - understands/communicates without difficulty --    Swallowing --  0 - swallows foods/liquids without difficulty --    Daily Care Interventions   Self-Care Promotion independence encouraged;BADL personal objects within reach;BADL personal routines maintained --  --    Activity   Activity Assistance Provided --  --  independent     Intervention: Reduce Risk/Promote Restraint Free Environment   03/23/18 0736   Safety Interventions   Environmental Safety Modification assistive device/personal items within reach;clutter free environment maintained;lighting adjusted;room near unit station;room organization consistent     Intervention: Review Medications/Identify Contributors to Fall Risk   03/23/18 0736   Safety Interventions   Medication Review/Management medications reviewed;dosing adjusted;high risk medications identified     Intervention: Patient Rounds   03/23/18 0736   Safety Interventions   Patient Rounds bed in low position;bed wheels locked;call light in reach;clutter free environment maintained;ID band on;placement of personal items at bedside;toileting offered;visualized patient     Intervention: Safety Promotion/Fall Prevention   03/23/18 0736   Safety Interventions   Safety Promotion/Fall Prevention assistive device/personal item within reach;bed alarm set;commode/urinal/bedpan at bedside;Fall Risk reviewed with patient/family;medications reviewed;lighting adjusted;nonskid shoes/socks when out of bed;side rails raised x 3         Comments: Pt admitted with DVT TO RT PT.pt dp  Pulses are palpitable and pt pulses are audible with doppler.  No s/s of circulatory  Impairment, pt with edema to feet and ankles.

## 2018-03-23 NOTE — ASSESSMENT & PLAN NOTE
"Interestingly, her complaints were of right lower extremity pain but her lower extremity venous ultrasound was significant for "[n]ew DVT left posterior tibial vein only."  She doesn't appear to have any provoking factors and CT-A chest was negative for pulmonary embolus.  She has been started on treatment dose enoxaparin.  "

## 2018-03-23 NOTE — PROGRESS NOTES
TN provided patient with appointment information. Patient verbalized understanding.     Nurse Barton informed that patient can discharge from  standpoint.

## 2018-03-23 NOTE — H&P
[facility] Hospital Medicine  History & Physical    Patient Name: Maryam Oneal  MRN: [id number]  Admission Date: 3/22/2018  Attending Physician: Uziel Hill MD   Primary Care Provider: Mich Castle MD         Patient information was obtained from patient.     Subjective:     Principal Problem:Acute deep vein thrombosis (DVT) of left lower extremity    Chief Complaint: Leg swelling for three weeks.    HPI: Ms. Maryam Oneal is a 37 y.o. female with essential hypertension, anemia of chronic disease, and morbid obesity (BMI 51.2) who presents to [facility] ED with complaints of right lower extremity pain of three weeks.  The pain is mainly to the calf area and is worse with movement; there is also increased swelling to both legs.  She also complains of shortness of breath and pleuritic chest pain that she describes as midsternal without radiation; it is worse on deep inspiration.  She denies any fevers or chills.  She has not had any coughing nor any hemoptysis, and denies any recent travel nor any trauma to her legs.  She has never had similar issues in the past.    Chart Review:  Previous Hospitalizations  Date Hospital Diagnosis   Aug 2010 [facility] Caesarian section complicated by wound infection      Past Medical History:   Diagnosis Date    Arthritis     Hypertension        Past Surgical History:   Procedure Laterality Date     SECTION, LOW TRANSVERSE  2010    TUBAL LIGATION             Review of patient's allergies indicates:  No Known Allergies    No current facility-administered medications on file prior to encounter.      Current Outpatient Prescriptions on File Prior to Encounter   Medication Sig    ibuprofen (ADVIL,MOTRIN) 600 MG tablet Take 1 tablet (600 mg total) by mouth every 6 (six) hours as needed for Pain.    losartan-hydrochlorothiazide 100-25 mg (HYZAAR) 100-25 mg per tablet Take 1 tablet by mouth once daily.    meloxicam (MOBIC) 7.5 MG  tablet Take 1 tablet (7.5 mg total) by mouth once daily.     Family History     Problem Relation (Age of Onset)    Hypertension Mother        Social History Main Topics    Smoking status: Former Smoker    Smokeless tobacco: Never Used    Alcohol use 0.6 oz/week     1 Glasses of wine per week    Drug use: Yes     Types: Marijuana      Comment: last smoked a couple of days ago    Sexual activity: Not Currently     Review of Systems   Constitutional: Negative for activity change, appetite change, chills, diaphoresis, fatigue, fever and unexpected weight change.   HENT: Negative.    Eyes: Negative.    Respiratory: Negative for cough, chest tightness, shortness of breath and wheezing.    Cardiovascular: Negative for chest pain, palpitations and leg swelling.   Gastrointestinal: Negative for abdominal distention, abdominal pain, blood in stool, constipation, diarrhea, nausea and vomiting.   Genitourinary: Negative for dysuria and hematuria.   Musculoskeletal: Negative.    Skin: Negative.    Neurological: Negative for dizziness, seizures, syncope, weakness and light-headedness.   Psychiatric/Behavioral: Negative.      Objective:     Vital Signs (Most Recent):  Temp: 97.4 °F (36.3 °C) (03/22/18 2345)  Pulse: 65 (03/22/18 2345)  Resp: 18 (03/22/18 2345)  BP: 133/79 (03/22/18 2345)  SpO2: 100 % (03/22/18 2345) Vital Signs (24h Range):  Temp:  [97.4 °F (36.3 °C)-98.6 °F (37 °C)] 97.4 °F (36.3 °C)  Pulse:  [62-71] 65  Resp:  [17-18] 18  SpO2:  [98 %-100 %] 100 %  BP: (133-181)/() 133/79     Weight: (!) 190.8 kg (420 lb 10.2 oz)  Body mass index is 51.2 kg/m².    Physical Exam   Constitutional: She is oriented to person, place, and time. She appears well-developed and well-nourished. No distress.   morbidly obese   HENT:   Head: Normocephalic and atraumatic.   Right Ear: External ear normal.   Left Ear: External ear normal.   Nose: Nose normal.   Eyes: Right eye exhibits no discharge. Left eye exhibits no discharge.  "  Neck: Normal range of motion.   Cardiovascular: Normal rate, regular rhythm, normal heart sounds and intact distal pulses.  Exam reveals no gallop and no friction rub.    No murmur heard.  Pulmonary/Chest: Effort normal and breath sounds normal. No respiratory distress. She has no wheezes. She has no rales. She exhibits no tenderness.   Abdominal: Soft. Bowel sounds are normal. She exhibits no distension. There is no tenderness. There is no rebound and no guarding.   Musculoskeletal: Normal range of motion. She exhibits no edema.   Neurological: She is alert and oriented to person, place, and time.   Skin: Skin is warm and dry. She is not diaphoretic. No erythema.   Psychiatric: She has a normal mood and affect. Her behavior is normal. Judgment and thought content normal.   Nursing note and vitals reviewed.          Significant Labs: All pertinent labs within the past 24 hours have been reviewed.    Significant Imaging: I have reviewed and interpreted all pertinent imaging results/findings within the past 24 hours.    Assessment/Plan:     * Acute deep vein thrombosis (DVT) of left lower extremity    Interestingly, her complaints were of right lower extremity pain but her lower extremity venous ultrasound was significant for "[n]ew DVT left posterior tibial vein only."  She doesn't appear to have any provoking factors and CT-A chest was negative for pulmonary embolus.  She has been started on treatment dose enoxaparin.        Essential hypertension    Patient's blood pressure is poorly-controlled; will continue home regimen of hydrochlorothiazide and losartan, and provide as-needed clonidine.        Anemia of chronic disease    The patient's H/H is stable and consistent with previous laboratory measurements, and the patient exhibits no signs or symptoms of acute bleeding; there is no indication for transfusion.  Will continue to monitor.        Morbid obesity with BMI of 50.0-59.9, adult    The patient has been " counseled on the negative impact that obesity imparts on her health, and was encouraged to make lifestyle changes in order to lose weight and decrease her modifiable risk factors.          VTE Risk Mitigation         Ordered     enoxaparin injection 150 mg  Every 12 hours (non-standard times)     Route:  Subcutaneous        03/22/18 1800           Total time spent on case: 45 minutes.        Dereje Martinez M.D.  Staff [facility] Hospitalist  Department of Hospital Medicine  [facility]  Pager: [phone]

## 2018-03-23 NOTE — PROGRESS NOTES
PT with c/o of pain to rt leg and no orders noted on chart for pain med, Dr. Martinez called and informed orders noted and pt informed.

## 2018-03-23 NOTE — PROGRESS NOTES
Pt arrived on unit via stretcher accompanied per transporter. Pt aaox4, pt assisted into bed, telemetry monitor applied, vital signs taken,pt oriented to environment.saline lock noted to rt and lt ac area sites are clear. Pt with edema noted to feet. Pt  Pt given instructions on admission blue booklet at beside. Pt personal belongings at bedside. Pt informed of md orders.safety iniaited with bed alarm.

## 2018-04-15 ENCOUNTER — HOSPITAL ENCOUNTER (EMERGENCY)
Facility: HOSPITAL | Age: 38
Discharge: HOME | End: 2018-04-15
Attending: EMERGENCY MEDICINE
Payer: MEDICAID

## 2018-04-15 VITALS
SYSTOLIC BLOOD PRESSURE: 128 MMHG | HEART RATE: 68 BPM | RESPIRATION RATE: 16 BRPM | OXYGEN SATURATION: 100 % | HEIGHT: 72 IN | DIASTOLIC BLOOD PRESSURE: 74 MMHG | TEMPERATURE: 98 F | BODY MASS INDEX: 39.68 KG/M2 | WEIGHT: 293 LBS

## 2018-04-15 DIAGNOSIS — R07.9 CHEST PAIN: ICD-10-CM

## 2018-04-15 LAB
ALBUMIN SERPL BCP-MCNC: 3.5 G/DL
ALP SERPL-CCNC: 59 U/L
ALT SERPL W/O P-5'-P-CCNC: 7 U/L
ANION GAP SERPL CALC-SCNC: 7 MMOL/L
AST SERPL-CCNC: 12 U/L
BASOPHILS # BLD AUTO: 0.01 K/UL
BASOPHILS NFR BLD: 0.2 %
BILIRUB SERPL-MCNC: 0.3 MG/DL
BNP SERPL-MCNC: 47 PG/ML
BUN SERPL-MCNC: 12 MG/DL
CALCIUM SERPL-MCNC: 8.9 MG/DL
CHLORIDE SERPL-SCNC: 105 MMOL/L
CO2 SERPL-SCNC: 25 MMOL/L
CREAT SERPL-MCNC: 0.9 MG/DL
D DIMER PPP IA.FEU-MCNC: 0.36 MG/L FEU
DIFFERENTIAL METHOD: ABNORMAL
EOSINOPHIL # BLD AUTO: 0 K/UL
EOSINOPHIL NFR BLD: 0.9 %
ERYTHROCYTE [DISTWIDTH] IN BLOOD BY AUTOMATED COUNT: 15.2 %
EST. GFR  (AFRICAN AMERICAN): >60 ML/MIN/1.73 M^2
EST. GFR  (NON AFRICAN AMERICAN): >60 ML/MIN/1.73 M^2
GLUCOSE SERPL-MCNC: 88 MG/DL
HCT VFR BLD AUTO: 31.6 %
HGB BLD-MCNC: 10.1 G/DL
LYMPHOCYTES # BLD AUTO: 2 K/UL
LYMPHOCYTES NFR BLD: 44.2 %
MCH RBC QN AUTO: 27.9 PG
MCHC RBC AUTO-ENTMCNC: 32 G/DL
MCV RBC AUTO: 87 FL
MONOCYTES # BLD AUTO: 0.5 K/UL
MONOCYTES NFR BLD: 10.2 %
NEUTROPHILS # BLD AUTO: 2 K/UL
NEUTROPHILS NFR BLD: 44.3 %
PLATELET # BLD AUTO: 233 K/UL
PMV BLD AUTO: 10.5 FL
POTASSIUM SERPL-SCNC: 3.6 MMOL/L
PROT SERPL-MCNC: 7.4 G/DL
RBC # BLD AUTO: 3.62 M/UL
SODIUM SERPL-SCNC: 137 MMOL/L
TROPONIN I SERPL DL<=0.01 NG/ML-MCNC: <0.006 NG/ML
WBC # BLD AUTO: 4.5 K/UL

## 2018-04-15 PROCEDURE — 93010 ELECTROCARDIOGRAM REPORT: CPT | Mod: ,,, | Performed by: INTERNAL MEDICINE

## 2018-04-15 PROCEDURE — 85379 FIBRIN DEGRADATION QUANT: CPT

## 2018-04-15 PROCEDURE — 80053 COMPREHEN METABOLIC PANEL: CPT

## 2018-04-15 PROCEDURE — 83880 ASSAY OF NATRIURETIC PEPTIDE: CPT

## 2018-04-15 PROCEDURE — 84484 ASSAY OF TROPONIN QUANT: CPT

## 2018-04-15 PROCEDURE — 85025 COMPLETE CBC W/AUTO DIFF WBC: CPT

## 2018-04-15 PROCEDURE — 93005 ELECTROCARDIOGRAM TRACING: CPT

## 2018-04-15 PROCEDURE — 99284 EMERGENCY DEPT VISIT MOD MDM: CPT

## 2018-04-15 NOTE — ED TRIAGE NOTES
"Pt presents to ED with c/o SOB and her "legs being so heavy" that started Friday. Denies CP, N/V, Will continue to monitor.  "

## 2018-04-15 NOTE — ED PROVIDER NOTES
Encounter Date: 4/15/2018    SCRIBE #1 NOTE: I, Dameon Campos, am scribing for, and in the presence of,  Surekha Lee MD. I have scribed the following portions of the note - Other sections scribed: ROS, HPI.       History     Chief Complaint   Patient presents with    Shortness of Breath     sob started this am.  feels like i'm choaking.      Leg Pain     bilat leg pains.  started last pm.  states they feel heavy.  pain is mostly in bilat thigh area.     CC: Shortness of Breath; Leg Pain    HPI: Patient is a 38 y.o. F (LMP 18) with a past medical history of Arthritis (in knees, bilaterally); DVT (3/23/18) and Hypertension who presents to the ED for evaluation of shortness of breath which began acutely 7 hours ago and bilateral leg pain (above and below the knees). Patient reports chronic, stable BLE edema. No symptomatic treatment prior to arrival. Patient denies chest pain, fever, and/or cough.    Patient denies tobacco use.  Since being diagnosed with a DVT of the left leg 3/23/18. Patient reports compliance with Eliquis        The history is provided by the patient. No  was used.     Review of patient's allergies indicates:  No Known Allergies  Past Medical History:   Diagnosis Date    Arthritis     Hypertension      Past Surgical History:   Procedure Laterality Date     SECTION, LOW TRANSVERSE  2010    TUBAL LIGATION           Family History   Problem Relation Age of Onset    Hypertension Mother      Social History   Substance Use Topics    Smoking status: Former Smoker    Smokeless tobacco: Never Used    Alcohol use 0.6 oz/week     1 Glasses of wine per week     Review of Systems   Constitutional: Negative for fever.   HENT: Negative for sore throat.    Eyes: Negative for pain.   Respiratory: Positive for shortness of breath. Negative for cough.    Cardiovascular: Positive for leg swelling (bilaterally, chronic, stable). Negative for chest pain.    Gastrointestinal: Negative for abdominal pain, diarrhea, nausea and vomiting.   Genitourinary: Negative for dysuria.   Musculoskeletal:        (+) Bilateral leg pain (above and below the knee)   Skin: Negative for rash.   Neurological: Negative for headaches.       Physical Exam     Initial Vitals [04/15/18 1748]   BP Pulse Resp Temp SpO2   (!) 142/85 88 18 99 °F (37.2 °C) 100 %      MAP       104         Physical Exam    Nursing note and vitals reviewed.  Constitutional: She appears well-developed and well-nourished.  Non-toxic appearance. She does not appear ill.   HENT:   Head: Normocephalic and atraumatic.   Mouth/Throat: Oropharynx is clear and moist.   Eyes: EOM are normal. Pupils are equal, round, and reactive to light. No scleral icterus.   Neck: Neck supple.   Cardiovascular: Normal rate and regular rhythm.   No murmur heard.  Pulmonary/Chest: Effort normal and breath sounds normal. No respiratory distress. She has no wheezes. She has no rhonchi. She has no rales. She exhibits no tenderness.   Abdominal: Soft. Normal appearance and bowel sounds are normal. She exhibits no distension. There is no tenderness.   Musculoskeletal: Normal range of motion. She exhibits no edema.   Bilateral calf tenderness.  Bilateral thigh tenderness.   Neurological: She is alert.   Skin: Skin is warm and dry.   Psychiatric: She has a normal mood and affect.         ED Course   Procedures  Labs Reviewed   CBC W/ AUTO DIFFERENTIAL   COMPREHENSIVE METABOLIC PANEL   B-TYPE NATRIURETIC PEPTIDE   D DIMER, QUANTITATIVE   TROPONIN I             Medical Decision Making:   Initial Assessment:   PT WITH DVT MARCH 23 18 AND ON ELIQUIS   NEVER PE     PT VERY ANXIOUS EVER SINCE THIS HAPPENED.   THIS AM SL DYSPNEA  NO SYMPTOMS OF INFECTIOUS PROBLEMS    I AM SURE THE PT IS ACTUALLY TAKING THE ELIQUIS.      Differential Diagnosis:   DVT  PE  CARDIAC ISCHEMIA  ED Management:  WBC 5  HGB 10.1  (10.0 LST MONTH)  TROP U NDER 0.006     D-DIMER    0.36   CXR  NAD   EKG  NL        PT HAS APPT HER DOCTOR ON WED   COPY OF LABS TO GO             Scribe Attestation:   Scribe #1: I performed the above scribed service and the documentation accurately describes the services I performed. I attest to the accuracy of the note.    Attending Attestation:           Physician Attestation for Scribe:  Physician Attestation Statement for Scribe #1: I, Surekha Lee MD, reviewed documentation, as scribed by Dameon Campos in my presence, and it is both accurate and complete.                    Clinical Impression:   The encounter diagnosis was Chest pain.                           Surekha Lee MD  04/15/18 8947

## 2018-06-27 ENCOUNTER — HOSPITAL ENCOUNTER (EMERGENCY)
Facility: HOSPITAL | Age: 38
Discharge: HOME | End: 2018-06-28
Attending: EMERGENCY MEDICINE
Payer: MEDICAID

## 2018-06-27 DIAGNOSIS — R60.0 PERIPHERAL EDEMA: Primary | ICD-10-CM

## 2018-06-27 DIAGNOSIS — R60.9 EDEMA: ICD-10-CM

## 2018-06-27 LAB
ALBUMIN SERPL BCP-MCNC: 3.3 G/DL
ALP SERPL-CCNC: 52 U/L
ALT SERPL W/O P-5'-P-CCNC: 7 U/L
ANION GAP SERPL CALC-SCNC: 8 MMOL/L
AST SERPL-CCNC: 9 U/L
B-HCG UR QL: NEGATIVE
BASOPHILS # BLD AUTO: 0.01 K/UL
BASOPHILS NFR BLD: 0.2 %
BILIRUB SERPL-MCNC: 0.3 MG/DL
BNP SERPL-MCNC: 26 PG/ML
BUN SERPL-MCNC: 12 MG/DL
CALCIUM SERPL-MCNC: 9 MG/DL
CHLORIDE SERPL-SCNC: 109 MMOL/L
CO2 SERPL-SCNC: 24 MMOL/L
CREAT SERPL-MCNC: 0.9 MG/DL
DIFFERENTIAL METHOD: ABNORMAL
EOSINOPHIL # BLD AUTO: 0 K/UL
EOSINOPHIL NFR BLD: 0.6 %
ERYTHROCYTE [DISTWIDTH] IN BLOOD BY AUTOMATED COUNT: 15.2 %
EST. GFR  (AFRICAN AMERICAN): >60 ML/MIN/1.73 M^2
EST. GFR  (NON AFRICAN AMERICAN): >60 ML/MIN/1.73 M^2
GLUCOSE SERPL-MCNC: 88 MG/DL
HCT VFR BLD AUTO: 31.3 %
HGB BLD-MCNC: 10 G/DL
LYMPHOCYTES # BLD AUTO: 2.1 K/UL
LYMPHOCYTES NFR BLD: 38.5 %
MCH RBC QN AUTO: 28.2 PG
MCHC RBC AUTO-ENTMCNC: 31.9 G/DL
MCV RBC AUTO: 88 FL
MONOCYTES # BLD AUTO: 0.4 K/UL
MONOCYTES NFR BLD: 6.5 %
NEUTROPHILS # BLD AUTO: 2.9 K/UL
NEUTROPHILS NFR BLD: 54 %
PLATELET # BLD AUTO: 209 K/UL
PMV BLD AUTO: 10.2 FL
POTASSIUM SERPL-SCNC: 4 MMOL/L
PROT SERPL-MCNC: 7.3 G/DL
RBC # BLD AUTO: 3.54 M/UL
SODIUM SERPL-SCNC: 141 MMOL/L
WBC # BLD AUTO: 5.4 K/UL

## 2018-06-27 PROCEDURE — 83880 ASSAY OF NATRIURETIC PEPTIDE: CPT

## 2018-06-27 PROCEDURE — 80053 COMPREHEN METABOLIC PANEL: CPT

## 2018-06-27 PROCEDURE — 93005 ELECTROCARDIOGRAM TRACING: CPT

## 2018-06-27 PROCEDURE — 81025 URINE PREGNANCY TEST: CPT

## 2018-06-27 PROCEDURE — 99284 EMERGENCY DEPT VISIT MOD MDM: CPT

## 2018-06-27 PROCEDURE — 85025 COMPLETE CBC W/AUTO DIFF WBC: CPT

## 2018-06-27 RX ORDER — FUROSEMIDE 20 MG/1
20 TABLET ORAL DAILY
Qty: 5 TABLET | Refills: 0 | Status: SHIPPED | OUTPATIENT
Start: 2018-06-27 | End: 2018-08-20

## 2018-06-27 RX ORDER — APIXABAN 2.5 MG/1
2.5 TABLET, FILM COATED ORAL 2 TIMES DAILY
COMMUNITY
End: 2020-11-19

## 2018-06-27 RX ORDER — POTASSIUM CHLORIDE 750 MG/1
10 TABLET, EXTENDED RELEASE ORAL DAILY
Qty: 5 TABLET | Refills: 0 | Status: SHIPPED | OUTPATIENT
Start: 2018-06-27 | End: 2018-08-20

## 2018-06-27 NOTE — ED PROVIDER NOTES
"Encounter Date: 2018    SCRIBE #1 NOTE: I, Dov Malik, am scribing for, and in the presence of,  Walker Gerardo MD. I have scribed the following portions of the note - Other sections scribed: HPI, ROS, PE.       History     Chief Complaint   Patient presents with    Leg Swelling     Pt. reports hx of HTN, reports right leg has been signficantly swollen for the last few weeks, has been compliant with BP medication but swelling has not decreased, rates pain in right leg 8/10, denies any SOB, fever or chest pain. No trauma to the right leg     CC: Leg Swelling    HPI: 39 y/o female with medical hx of arthritis and HTN presents to the ED c/o x2 week hx of bilateral leg swelling. Pt believes that the R leg is more swollen and reports it is "bothering her", noting its worse with movement. Pt states she stayed in bed for x2 days, stating she wanted to stay off her feet to see if symptoms resolved. Pt reports being on Eliquis for an acute DVT diagnosed in 3/2018. Pt notes LMP was on 18. Pt denies SOB, chest pain, abdominal pain, HA, numbness, or any other associated symptoms. No modifying factors or tx PTA.       The history is provided by the patient. No  was used.     Review of patient's allergies indicates:  No Known Allergies  Past Medical History:   Diagnosis Date    Arthritis     Hypertension      Past Surgical History:   Procedure Laterality Date     SECTION, LOW TRANSVERSE  2010    TUBAL LIGATION           Family History   Problem Relation Age of Onset    Hypertension Mother      Social History   Substance Use Topics    Smoking status: Former Smoker     Packs/day: 0.50     Years: 8.00     Types: Cigarettes    Smokeless tobacco: Never Used    Alcohol use 0.6 oz/week     1 Glasses of wine per week      Comment: occassion     Review of Systems   Constitutional: Negative for chills and fever.   HENT: Negative for congestion, ear pain, rhinorrhea and sore throat.  "   Eyes: Negative for pain and redness.   Respiratory: Negative for cough and shortness of breath.    Cardiovascular: Positive for leg swelling (bilateral pretibial edema). Negative for chest pain.   Gastrointestinal: Negative for abdominal pain, diarrhea, nausea and vomiting.   Genitourinary: Negative for dysuria, flank pain, hematuria and urgency.   Musculoskeletal: Negative for back pain and neck pain.   Skin: Negative for rash.   Neurological: Negative for dizziness, weakness, light-headedness, numbness and headaches.   All other systems reviewed and are negative.      Physical Exam     Initial Vitals [06/27/18 1633]   BP Pulse Resp Temp SpO2   (!) 163/88 76 18 99 °F (37.2 °C) 100 %      MAP       --         Physical Exam    Constitutional: She appears well-developed and well-nourished. She is not diaphoretic. No distress.   HENT:   Head: Normocephalic and atraumatic.   Eyes: Conjunctivae and EOM are normal. Pupils are equal, round, and reactive to light.   Neck: Normal range of motion. Neck supple.   Cardiovascular: Normal rate, regular rhythm and normal heart sounds. Exam reveals no gallop and no friction rub.    No murmur heard.  Pulmonary/Chest: Breath sounds normal. No respiratory distress. She has no wheezes. She has no rhonchi. She has no rales.   Abdominal: Soft. Bowel sounds are normal. She exhibits no distension. There is no tenderness. There is no guarding.   Musculoskeletal: Normal range of motion. She exhibits edema (Pt has 1+ bilateral pretibial edema.). She exhibits no tenderness.   Neurological: She is alert and oriented to person, place, and time. She has normal strength.   Skin: Skin is warm and dry. No rash noted. No erythema.   Psychiatric: She has a normal mood and affect. Her behavior is normal. Judgment and thought content normal.         ED Course   Procedures  Labs Reviewed   CBC W/ AUTO DIFFERENTIAL - Abnormal; Notable for the following:        Result Value    RBC 3.54 (*)     Hemoglobin  10.0 (*)     Hematocrit 31.3 (*)     MCHC 31.9 (*)     RDW 15.2 (*)     All other components within normal limits   COMPREHENSIVE METABOLIC PANEL - Abnormal; Notable for the following:     Albumin 3.3 (*)     Alkaline Phosphatase 52 (*)     AST 9 (*)     ALT 7 (*)     All other components within normal limits   PREGNANCY TEST, URINE RAPID   B-TYPE NATRIURETIC PEPTIDE     EKG Readings: (Independently Interpreted)   Initial Reading: No STEMI. Rhythm: Normal Sinus Rhythm. Heart Rate: 60. Ectopy: No Ectopy. Conduction: Normal. ST Segments: Normal ST Segments. T Waves: Normal. Axis: Normal. Other Impression: NSR       Imaging Results          US Lower Extremity Veins Bilateral (Final result)  Result time 06/27/18 19:11:07    Final result by Dariel Kinney MD (06/27/18 19:11:07)                 Impression:      No evidence of deep venous thrombosis in either lower extremity.    Left popliteal fossa cyst.      Electronically signed by: Dariel Kinney MD  Date:    06/27/2018  Time:    19:11             Narrative:    EXAMINATION:  US LOWER EXTREMITY VEINS BILATERAL    TECHNIQUE:  Duplex and color flow Doppler and dynamic compression was performed of the bilateral lower extremity veins was performed.    COMPARISON:  03/22/2018    FINDINGS:  Duplex and color flow Doppler evaluation does not reveal any evidence of acute venous thrombosis in the common femoral, superficial femoral, greater saphenous, popliteal, peroneal, anterior tibial and posterior tibial veins bilaterally.  There is no reflux to suggest valvular incompetence.    There is a relatively hypoechoic structure within the left popliteal fossa measuring 1.6 x 2.0 x 3.3 cm without internal vascularity.                               X-Ray Chest AP Portable (Final result)  Result time 06/27/18 18:31:49   Procedure changed from X-Ray Chest PA And Lateral     Final result by Jacob Lobato MD (06/27/18 18:31:49)                 Impression:      Cardiomegaly with mild  pulmonary vascular congestion, suggestive of early CHF.      Electronically signed by: Jacob Lobato MD  Date:    06/27/2018  Time:    18:31             Narrative:    EXAMINATION:  XR CHEST AP PORTABLE    CLINICAL HISTORY:  Leg Swelling; Edema, unspecified    TECHNIQUE:  Single frontal view of the chest was performed.    COMPARISON:  Chest x-ray dated 04/15/2018.    FINDINGS:  The trachea is unremarkable.  The cardiomediastinal silhouette is enlarged.  The hemidiaphragms are unremarkable.  There is no evidence of free air beneath the hemidiaphragms.  There are no pleural effusions.  There is no evidence of a pneumothorax.  There is no evidence of pneumomediastinum.  No airspace opacities are present.  There is mild pulmonary vascular congestion.  The osseous structures are unremarkable.                                 Medical Decision Making:   Clinical Tests:   Lab Tests: Reviewed  Radiological Study: Reviewed  ED Management:  9:28 PM  Upon reviewing records pt had a negative CTA chest for PE, but diagnosed with a left lower leg acute DVT in 3/2018. Pt was discharged and given Eliquis. Pt denies chest pain or SOB at this junction.      Dr. Oneal, who was on call for patient's PCP, was contacted and will arrange for appropriate outpatient work-up to include ECHO. Discussed with patient who voices understanding. Will prescribe po Lasix and supplemental potassium. Today's CXR exhibits cardiomegaly with signs of possible early CHF. Patient has been in no respiratory distress.            Scribe Attestation:   Scribe #1: I performed the above scribed service and the documentation accurately describes the services I performed. I attest to the accuracy of the note.    Attending Attestation:           Physician Attestation for Scribe:  Physician Attestation Statement for Scribe #1: I, Walker Gerardo MD, reviewed documentation, as scribed by Dov Malik in my presence, and it is both accurate and complete.                     Clinical Impression:   The primary encounter diagnosis was Peripheral edema. A diagnosis of Edema was also pertinent to this visit.                             Walker Gerardo MD  06/27/18 2318       Walker Gerardo MD  06/28/18 0005

## 2018-06-27 NOTE — ED TRIAGE NOTES
Pt. Ambulates to room with complaints of bilateral lower leg swelling but is more concern with her right leg pain and swelling. Pt. States that she has notice more swelling to her right leg more and intermittent with movement and walking.Pt. Denies any chest pain but complaints of dyspnea when taking a deep breathe.

## 2018-06-28 VITALS
HEIGHT: 72 IN | RESPIRATION RATE: 17 BRPM | WEIGHT: 293 LBS | HEART RATE: 67 BPM | BODY MASS INDEX: 39.68 KG/M2 | SYSTOLIC BLOOD PRESSURE: 144 MMHG | TEMPERATURE: 99 F | DIASTOLIC BLOOD PRESSURE: 92 MMHG | OXYGEN SATURATION: 100 %

## 2018-06-28 PROCEDURE — 93005 ELECTROCARDIOGRAM TRACING: CPT

## 2018-06-28 PROCEDURE — 93010 ELECTROCARDIOGRAM REPORT: CPT | Mod: ,,, | Performed by: INTERNAL MEDICINE

## 2018-06-28 NOTE — DISCHARGE INSTRUCTIONS
Elevate legs when recumbent, return promptly if concerning symptoms arise, follow-up with your PCP this week

## 2018-08-20 ENCOUNTER — HOSPITAL ENCOUNTER (EMERGENCY)
Facility: HOSPITAL | Age: 38
Discharge: HOME | End: 2018-08-20
Attending: EMERGENCY MEDICINE
Payer: MEDICAID

## 2018-08-20 VITALS
RESPIRATION RATE: 18 BRPM | TEMPERATURE: 98 F | HEART RATE: 61 BPM | HEIGHT: 72 IN | OXYGEN SATURATION: 99 % | WEIGHT: 293 LBS | BODY MASS INDEX: 39.68 KG/M2 | DIASTOLIC BLOOD PRESSURE: 80 MMHG | SYSTOLIC BLOOD PRESSURE: 141 MMHG

## 2018-08-20 DIAGNOSIS — Z91.148 NONCOMPLIANCE WITH MEDICATION REGIMEN: ICD-10-CM

## 2018-08-20 DIAGNOSIS — M79.89 LEG SWELLING: Primary | ICD-10-CM

## 2018-08-20 LAB
B-HCG UR QL: NEGATIVE
CTP QC/QA: YES

## 2018-08-20 PROCEDURE — 99283 EMERGENCY DEPT VISIT LOW MDM: CPT

## 2018-08-20 PROCEDURE — 81025 URINE PREGNANCY TEST: CPT | Performed by: NURSE PRACTITIONER

## 2018-08-21 NOTE — ED PROVIDER NOTES
Encounter Date: 2018       History     Chief Complaint   Patient presents with    Rash     Red raised bumps to bilateral lower legs with swelling.     38-year-old female with medical history of DVT presents to the emergency room with bilateral leg pain for 2 days. Patient reported missing 3 doses of Eliquis last week.  No associated factors. Patient tried Advils with no relief.  Bending and walking aggravate symptoms. No alleviating factors.  Pain noted to be intermittently and rated 9 on pain scale.  The patient denies any recent surgeries. The patient denies fever, chills, chest pain, shortness of breath, abdominal pain, dizziness, rash, back pain, numbness and dizziness.        Leg Pain    The incident occurred at home. There was no injury mechanism. The incident occurred two days ago. The pain is present in the right leg and left leg. The quality of the pain is described as aching. The pain is at a severity of 9/10. The pain has been intermittent since onset. Pertinent negatives include no numbness, no inability to bear weight, no loss of motion, no muscle weakness, no loss of sensation and no tingling. She reports no foreign bodies present. Nothing aggravates the symptoms. She has tried NSAIDs for the symptoms.     Review of patient's allergies indicates:  No Known Allergies  Past Medical History:   Diagnosis Date    Arthritis     Hypertension      Past Surgical History:   Procedure Laterality Date     SECTION, LOW TRANSVERSE  2010    TUBAL LIGATION           Family History   Problem Relation Age of Onset    Hypertension Mother      Social History     Tobacco Use    Smoking status: Former Smoker     Packs/day: 0.50     Years: 8.00     Pack years: 4.00     Types: Cigarettes    Smokeless tobacco: Never Used   Substance Use Topics    Alcohol use: Yes     Alcohol/week: 0.6 oz     Types: 1 Glasses of wine per week     Comment: occassion    Drug use: No     Comment: last smoked a  couple of days ago     Review of Systems   Constitutional: Negative for chills, diaphoresis, fatigue and fever.   HENT: Negative.    Eyes: Negative for pain and redness.   Respiratory: Negative for chest tightness and shortness of breath.    Cardiovascular: Negative for chest pain.   Gastrointestinal: Negative for abdominal pain, constipation, diarrhea, nausea and vomiting.   Genitourinary: Negative for decreased urine volume, dysuria, flank pain, hematuria, vaginal bleeding and vaginal pain.   Musculoskeletal: Negative for back pain, gait problem and neck pain.   Neurological: Negative for dizziness, tingling, seizures, weakness, light-headedness, numbness and headaches.   Psychiatric/Behavioral: Negative for agitation, behavioral problems, confusion and decreased concentration.       Physical Exam     Initial Vitals [08/20/18 1736]   BP Pulse Resp Temp SpO2   (!) 151/76 66 18 98.3 °F (36.8 °C) 100 %      MAP       --         Physical Exam    Nursing note and vitals reviewed.  Constitutional: Vital signs are normal. She appears well-developed and well-nourished.  Non-toxic appearance.   HENT:   Head: Normocephalic and atraumatic.   Right Ear: Hearing and external ear normal.   Left Ear: Hearing and external ear normal.   Mouth/Throat: Oropharynx is clear and moist.   Eyes: EOM are normal.   Neck: Neck supple. No JVD present.   Cardiovascular: Normal rate, regular rhythm, normal heart sounds and intact distal pulses. Exam reveals no decreased pulses.    Pulses:       Carotid pulses are 2+ on the right side, and 2+ on the left side.       Radial pulses are 2+ on the right side, and 2+ on the left side.        Popliteal pulses are 2+ on the right side, and 2+ on the left side.        Dorsalis pedis pulses are 2+ on the right side, and 2+ on the left side.        Posterior tibial pulses are 2+ on the right side, and 2+ on the left side.   Pulmonary/Chest: Effort normal and breath sounds normal. No apnea, no tachypnea  and no bradypnea. No respiratory distress. She has no decreased breath sounds.   Abdominal: Soft. Normal appearance and bowel sounds are normal. There is no tenderness. There is no rigidity, no rebound, no guarding, no CVA tenderness, no tenderness at McBurney's point and negative Johnson's sign.   Musculoskeletal: Normal range of motion.   Neurological: She is alert and oriented to person, place, and time. She has normal strength. No sensory deficit. She displays a negative Romberg sign.   Skin: Skin is warm. Capillary refill takes less than 2 seconds. No rash noted.   Psychiatric: She has a normal mood and affect. Her speech is normal and behavior is normal. Thought content normal.         ED Course   Procedures  Labs Reviewed   POCT URINE PREGNANCY          Imaging Results    None          Medical Decision Making:   Initial Assessment:   This is an emergent evaluation of a 38 y.o. female with a PMHx of DVT presenting to the ED for bilateral leg pain for 2 days. I can appreciate triage but patient denies rash during my exam. Patient noted to be noncompliant with Eliquis medication prescribed.  Patient prescribed Eliquis 5 mg b.i.d. And reported missing 3 days of taking medication last week. Patient denies any recent trauma or injury. Patient reports intermittent pain bilaterally leg for 2 days.  Slight edema noted to bilateral lower extremities.  Vitals normal. Patient is morbidly obese, non-toxic appearing and in no acute distress. Clear breath sounds in all lung fields.  Abdomen soft nontender. Lower extremities noted to be symmetrical. No overlying warmth, erythema, discoloration or bleed of extremities noted. No focal neuro deficits. Ambulatory without difficulty. FROM noted in lower extremities.        Differential Diagnosis:   Pneumonia, PE/DVT, Baker's cyst, cellulitis, lymphedema, chronic venous insufficiency, superficial thrombophlebitis, popliteal venous or arterial aneurysm, enlarged lymph nodes  compressing the veins, hematoma, and muscle tears           ED Management:  Patient pain controlled in the ED.  Patient educated on medication compliance, low-sodium diet and compression stockings.  The patient is stable for discharge. The patient instructed to keep PCP appointment for Wednesday for further evaluation of symptoms.      I discussed with the patient the diagnosis, treatment plan, indications for return to the emergency department, and for expected follow-up. The patient verbalized an understanding. The patient is asked if there are any questions or concerns. We discuss the case, until all issues are addressed to the patient's satisfaction. Patient understands and is agreeable to the plan.     I discussed this patient with Dr. Cook who is in agreement with my assessment and plan.     Prudence Mtz NP                             Clinical Impression:   Leg Swelling and Noncompliance with medication regimen      Disposition:   Disposition: Discharged  Condition: Stable                        Prudence Mtz NP  08/20/18 0639

## 2018-08-21 NOTE — DISCHARGE INSTRUCTIONS
TAKE YOUR ELIQUIS AS PRESCRIBED.    LOW-SODIUM DIET.  KEEP LEGS ELEVATED WHILE LYING OR SITTING    KEEP YOUR APPOINTMENT WITH YOUR PRIMARY CARE PROVIDER FOR WEDNESDAY    RETURN TO THE EMERGENCY ROOM IF SYMPTOMS WORSEN OR ANY OTHER CONCERNS.

## 2018-08-22 ENCOUNTER — HOSPITAL ENCOUNTER (OUTPATIENT)
Dept: RADIOLOGY | Facility: HOSPITAL | Age: 38
Discharge: HOME | End: 2018-08-22
Attending: NURSE PRACTITIONER
Payer: MEDICAID

## 2018-08-22 DIAGNOSIS — I80.292: Primary | ICD-10-CM

## 2018-08-22 DIAGNOSIS — I80.292: ICD-10-CM

## 2018-08-22 DIAGNOSIS — O22.30: ICD-10-CM

## 2018-08-22 PROCEDURE — 93970 EXTREMITY STUDY: CPT | Mod: 26,,, | Performed by: INTERNAL MEDICINE

## 2018-08-22 PROCEDURE — 93970 EXTREMITY STUDY: CPT | Mod: TC

## 2018-08-26 ENCOUNTER — HOSPITAL ENCOUNTER (EMERGENCY)
Facility: HOSPITAL | Age: 38
Discharge: HOME | End: 2018-08-27
Attending: EMERGENCY MEDICINE
Payer: MEDICAID

## 2018-08-26 DIAGNOSIS — R07.89 CHEST DISCOMFORT: ICD-10-CM

## 2018-08-26 DIAGNOSIS — I10 UNCONTROLLED HYPERTENSION: Primary | ICD-10-CM

## 2018-08-26 DIAGNOSIS — R07.9 CHEST PAIN: ICD-10-CM

## 2018-08-26 LAB
BASOPHILS # BLD AUTO: 0.01 K/UL
BASOPHILS NFR BLD: 0.2 %
DIFFERENTIAL METHOD: ABNORMAL
EOSINOPHIL # BLD AUTO: 0.1 K/UL
EOSINOPHIL NFR BLD: 1.4 %
ERYTHROCYTE [DISTWIDTH] IN BLOOD BY AUTOMATED COUNT: 14.3 %
HCT VFR BLD AUTO: 30.2 %
HGB BLD-MCNC: 10.1 G/DL
LYMPHOCYTES # BLD AUTO: 2.1 K/UL
LYMPHOCYTES NFR BLD: 48.3 %
MCH RBC QN AUTO: 28.8 PG
MCHC RBC AUTO-ENTMCNC: 33.4 G/DL
MCV RBC AUTO: 86 FL
MONOCYTES # BLD AUTO: 0.3 K/UL
MONOCYTES NFR BLD: 6.7 %
NEUTROPHILS # BLD AUTO: 1.9 K/UL
NEUTROPHILS NFR BLD: 43.4 %
PLATELET # BLD AUTO: 222 K/UL
PMV BLD AUTO: 9.6 FL
RBC # BLD AUTO: 3.51 M/UL
WBC # BLD AUTO: 4.31 K/UL

## 2018-08-26 PROCEDURE — 80053 COMPREHEN METABOLIC PANEL: CPT

## 2018-08-26 PROCEDURE — 99284 EMERGENCY DEPT VISIT MOD MDM: CPT | Mod: 25

## 2018-08-26 PROCEDURE — 85025 COMPLETE CBC W/AUTO DIFF WBC: CPT

## 2018-08-26 PROCEDURE — 85730 THROMBOPLASTIN TIME PARTIAL: CPT

## 2018-08-26 PROCEDURE — 85379 FIBRIN DEGRADATION QUANT: CPT

## 2018-08-26 PROCEDURE — 85610 PROTHROMBIN TIME: CPT

## 2018-08-26 PROCEDURE — 84484 ASSAY OF TROPONIN QUANT: CPT

## 2018-08-26 PROCEDURE — 93010 ELECTROCARDIOGRAM REPORT: CPT | Mod: ,,, | Performed by: INTERNAL MEDICINE

## 2018-08-26 RX ORDER — LOSARTAN POTASSIUM AND HYDROCHLOROTHIAZIDE 25; 100 MG/1; MG/1
1 TABLET ORAL DAILY
COMMUNITY
End: 2020-11-19

## 2018-08-27 VITALS
BODY MASS INDEX: 39.68 KG/M2 | HEART RATE: 60 BPM | SYSTOLIC BLOOD PRESSURE: 150 MMHG | HEIGHT: 72 IN | WEIGHT: 293 LBS | OXYGEN SATURATION: 100 % | RESPIRATION RATE: 16 BRPM | DIASTOLIC BLOOD PRESSURE: 82 MMHG | TEMPERATURE: 98 F

## 2018-08-27 LAB
ALBUMIN SERPL BCP-MCNC: 3.3 G/DL
ALP SERPL-CCNC: 56 U/L
ALT SERPL W/O P-5'-P-CCNC: 6 U/L
ANION GAP SERPL CALC-SCNC: 8 MMOL/L
APTT BLDCRRT: 27.1 SEC
AST SERPL-CCNC: 10 U/L
BILIRUB SERPL-MCNC: 0.3 MG/DL
BUN SERPL-MCNC: 9 MG/DL
CALCIUM SERPL-MCNC: 9.2 MG/DL
CHLORIDE SERPL-SCNC: 105 MMOL/L
CO2 SERPL-SCNC: 24 MMOL/L
CREAT SERPL-MCNC: 0.9 MG/DL
D DIMER PPP IA.FEU-MCNC: 0.67 MG/L FEU
EST. GFR  (AFRICAN AMERICAN): >60 ML/MIN/1.73 M^2
EST. GFR  (NON AFRICAN AMERICAN): >60 ML/MIN/1.73 M^2
GLUCOSE SERPL-MCNC: 104 MG/DL
INR PPP: 1
POTASSIUM SERPL-SCNC: 3.6 MMOL/L
PROT SERPL-MCNC: 7.1 G/DL
PROTHROMBIN TIME: 10.2 SEC
SODIUM SERPL-SCNC: 137 MMOL/L
TROPONIN I SERPL DL<=0.01 NG/ML-MCNC: <0.006 NG/ML

## 2018-08-27 PROCEDURE — 25500020 PHARM REV CODE 255: Performed by: EMERGENCY MEDICINE

## 2018-08-27 RX ADMIN — IOHEXOL 100 ML: 350 INJECTION, SOLUTION INTRAVENOUS at 01:08

## 2018-08-27 NOTE — ED TRIAGE NOTES
Presented to ED with c/o non radiating midsternal chest pain. Pain started yesterday. Also has bilateral non-pitting edema. She reports that she takes a daily diuretic, but does not feel like she has been putting out enough urine in the past week. Reported intermittent episodes blurry vision, HA, nausea, CASTILLO, and dizziness since yesterday.

## 2018-08-27 NOTE — ED PROVIDER NOTES
"Encounter Date: 2018  SORT: This is a 38 y.o. female who presents for emergent consideration of mid sternal chest pain with SOB. She reports symptoms began earlier today and are intermittent.  Initial exam: NAD, no evidence of acute respiratory compromise. Patient will be moved to a room when one is available. Orders placed.  CELY Ahmadi, HUI     SCRIBE #1 NOTE: IDameon, am scribing for, and in the presence of,  Danika Salazar MD. I have scribed the following portions of the note - Other sections scribed: ROS, HPI.       History     Chief Complaint   Patient presents with    Chest Pain     Pt c/o mid sternal CP without radiation with SOB since yesterday. Pt states she feels a little short winded.      CC: Chest Pain    HPI: Patient is a 38 y.o female with a past medical history of Arthritis, Hypertension, DVT (2018), and Phlebitis who presents to the ED for evaluation of non-radiating midsternal chest pain and shortness of breath which began acutely yesterday. She reports a similar prior episode "months ago." Patient reports following-up with cardiologist who performed an US. She also had an US 4 days ago which showed no DVT. Patient has a documented history of poor compliance with Losartan and Eliquis. She reports compliance with these medications today. No fever, chills, headache, nausea, diaphoresis, and/or emesis. No exacerbating or alleviating factors.    Social history negative for tobacco and/or EtOH use.  Family history significant for unspecified cardiac disease.      The history is provided by the patient. No  was used.     Review of patient's allergies indicates:  No Known Allergies  Past Medical History:   Diagnosis Date    Arthritis     Hypertension      Past Surgical History:   Procedure Laterality Date     SECTION, LOW TRANSVERSE  2010    TUBAL LIGATION           Family History   Problem Relation Age of Onset    Hypertension " Mother      Social History     Tobacco Use    Smoking status: Former Smoker     Packs/day: 0.50     Years: 8.00     Pack years: 4.00     Types: Cigarettes    Smokeless tobacco: Never Used   Substance Use Topics    Alcohol use: Yes     Alcohol/week: 0.6 oz     Types: 1 Glasses of wine per week     Comment: occassion    Drug use: No     Comment: last smoked a couple of days ago     Review of Systems   Constitutional: Negative for chills, diaphoresis and fever.   HENT: Negative for sore throat.    Respiratory: Positive for shortness of breath.    Cardiovascular: Positive for chest pain (midsternal).   Gastrointestinal: Negative for nausea.   Genitourinary: Negative for dysuria.   Musculoskeletal: Negative for back pain.   Skin: Negative for rash.   Neurological: Negative for weakness.   Hematological: Does not bruise/bleed easily.   All other systems reviewed and are negative.      Physical Exam     Initial Vitals [08/26/18 2253]   BP Pulse Resp Temp SpO2   (!) 170/83 69 16 99.2 °F (37.3 °C) 99 %      MAP       --         Physical Exam    Nursing note and vitals reviewed.  Constitutional: She appears well-developed and well-nourished. No distress.   obese   HENT:   Head: Normocephalic and atraumatic.   Nose: Nose normal.   Eyes: Conjunctivae are normal. No scleral icterus.   Neck: Neck supple. No JVD present.   Cardiovascular: Normal rate and regular rhythm.   Pulmonary/Chest: Breath sounds normal. No stridor. No respiratory distress. She has no wheezes. She has no rhonchi. She has no rales.   Abdominal: Soft. There is no tenderness. There is no rebound and no guarding.   Musculoskeletal: She exhibits edema (lower extremities). She exhibits no tenderness.   Neurological: She is alert. She has normal strength.   Normal speech   Skin: Skin is warm and dry. Capillary refill takes less than 2 seconds. No pallor.   Psychiatric: She has a normal mood and affect. Her behavior is normal. Thought content normal.         ED  Course   Procedures  Labs Reviewed   D DIMER, QUANTITATIVE - Abnormal; Notable for the following components:       Result Value    D-Dimer 0.67 (*)     All other components within normal limits   CBC W/ AUTO DIFFERENTIAL - Abnormal; Notable for the following components:    RBC 3.51 (*)     Hemoglobin 10.1 (*)     Hematocrit 30.2 (*)     Lymph% 48.3 (*)     All other components within normal limits   COMPREHENSIVE METABOLIC PANEL - Abnormal; Notable for the following components:    Albumin 3.3 (*)     ALT 6 (*)     All other components within normal limits   APTT   TROPONIN I   PROTIME-INR   POCT URINE PREGNANCY     EKG Readings: (Independently Interpreted)   Initial Reading: No STEMI. Previous EKG: Compared with most recent EKG Previous EKG Date: 6/28/2018. Rhythm: Normal Sinus Rhythm. Heart Rate: 69. Ectopy: No Ectopy. Conduction: Normal. ST Segments: Normal ST Segments. T Waves: Normal. Axis: Normal. Clinical Impression: Normal Sinus Rhythm       Imaging Results          CTA Chest Non-Coronary (PE Study) (Final result)  Result time 08/27/18 01:39:56    Final result by Jacob Lobato MD (08/27/18 01:39:56)                 Impression:      No evidence of central pulmonary embolism.  Distal and subsegmental pulmonary emboli are not excluded given the bolus timing.  Correlation with DVT study may be obtained, as clinically warranted.    Otherwise, no acute process in the chest.    Motion limited examination.      Electronically signed by: Jacob Lobato MD  Date:    08/27/2018  Time:    01:39             Narrative:    EXAMINATION:  CTA CHEST NON CORONARY    CLINICAL HISTORY:  Chest pain, acute, PE suspected, intermed prob, positive D-dimer;    TECHNIQUE:  Low dose axial images, sagittal and coronal reformations were obtained from the thoracic inlet to the lung bases following the IV administration of 100 mL of Omnipaque 350.  Contrast timing was optimized to evaluate the pulmonary arteries.  MIP images were  performed.    COMPARISON:  Chest x-ray dated 08/26/2018 and CT PE study dated 09/28/2013.    FINDINGS:  CT pulmonary embolism examination:    No filling defects are identified within the central pulmonary tree.  The distal pulmonary tree are not visualized secondary to bolus timing.    CT chest examination:    The thyroid gland is unremarkable.  The supraclavicular regions are within normal limits.  The base of the neck is within normal limits.    The trachea and central airways appear within normal limits.  There is no evidence of bronchiectasis.  The no endobronchial lesion is identified.    The heart is unremarkable.  There are no pericardial effusions.  There is no evidence of intracardiac thrombus.    The thoracic aorta is normal in caliber.  No intimal flap is noted to suggest a dissection.  The great vessels arising from the aortic arch are within normal limits.    There is no evidence of lymphadenopathy in the chest.  The axillary regions are within normal limits.    There are no pleural effusions.  There is no evidence of a pneumothorax.  There is no evidence of pneumomediastinum.  No airspace opacity is identified.  Evaluation for discrete nodules is difficult given motion limitations.    The esophagus is within normal limits.  The visualized upper abdominal structures are within normal limits.  There is no evidence of free air in the upper abdomen.    The chest wall is unremarkable.  The osseous structures are unremarkable.                               X-Ray Chest PA And Lateral (Final result)  Result time 08/26/18 23:27:54    Final result by Rian Aviles MD (08/26/18 23:27:54)                 Impression:      1. No acute radiographic findings in the chest.      Electronically signed by: Rian Aviles MD  Date:    08/26/2018  Time:    23:27             Narrative:    EXAMINATION:  XR CHEST PA AND LATERAL    CLINICAL HISTORY:  Chest pain, unspecified    TECHNIQUE:  PA and lateral views of the chest  were performed.    COMPARISON:  Radiograph 06/27/2018.    FINDINGS:  Mediastinal structures are midline.  Hilar contours are normal.  Cardiac silhouette is normal in size.  Lung volumes are normal and symmetric.  No consolidation.  No pneumothorax or pleural effusions.  No free air beneath the diaphragm.  No acute osseous abnormalities.                                 Medical Decision Making:   History:   Old Medical Records: I decided to obtain old medical records.  Old Records Summarized: records from previous admission(s).  Initial Assessment:   Presents with chest pain. She has history of DVT and non-compliance, although her most recent ultrasound showed no DVT. Will get d-dimer, since low pre-test probability  Normal EKG  Differential Diagnosis:   Cardiac ischemia  Pulmonary embolism  Pulmonary hypertension  Clinical Tests:   Lab Tests: Ordered and Reviewed  Radiological Study: Ordered and Reviewed  Medical Tests: Ordered and Reviewed  ED Management:  Patient had an elevated D-dimer and had a CT chest.  This study was partially not readable as her obese weight broke the CT table.  However, my pretest probability was low but by protocol CTA was warranted because she had a mildly elevated D-dimer.  I do believe this study was adequate given my low pretest probability and she can continue with outpatient follow-up for her chest pain. Of note had an extensive discussion since she has had multiple ED visits for chest pain that it is imperative she follow up and continue with outpatient workup            Scribe Attestation:   Scribe #1: I performed the above scribed service and the documentation accurately describes the services I performed. I attest to the accuracy of the note.    Attending Attestation:           Physician Attestation for Scribe:  Physician Attestation Statement for Scribe #1: I, Danika Salazar MD, reviewed documentation, as scribed by Dameon Campos in my presence, and it is both accurate and  complete.                    Clinical Impression:   Diagnoses of Chest discomfort and Chest pain were pertinent to this visit.      Disposition:   Disposition: Discharged  Condition: Stable                        Danika Salazar MD  08/27/18 0155

## 2018-11-04 ENCOUNTER — HOSPITAL ENCOUNTER (EMERGENCY)
Facility: HOSPITAL | Age: 38
Discharge: HOME | End: 2018-11-05
Attending: EMERGENCY MEDICINE
Payer: MEDICAID

## 2018-11-04 DIAGNOSIS — I82.409 DVT (DEEP VENOUS THROMBOSIS): ICD-10-CM

## 2018-11-04 DIAGNOSIS — R07.9 CHEST PAIN: ICD-10-CM

## 2018-11-04 LAB
BASOPHILS # BLD AUTO: 0.02 K/UL
BASOPHILS NFR BLD: 0.4 %
DIFFERENTIAL METHOD: ABNORMAL
EOSINOPHIL # BLD AUTO: 0.1 K/UL
EOSINOPHIL NFR BLD: 1.1 %
ERYTHROCYTE [DISTWIDTH] IN BLOOD BY AUTOMATED COUNT: 14.6 %
HCT VFR BLD AUTO: 31 %
HGB BLD-MCNC: 9.9 G/DL
LYMPHOCYTES # BLD AUTO: 2.2 K/UL
LYMPHOCYTES NFR BLD: 45.8 %
MCH RBC QN AUTO: 27.4 PG
MCHC RBC AUTO-ENTMCNC: 31.9 G/DL
MCV RBC AUTO: 86 FL
MONOCYTES # BLD AUTO: 0.4 K/UL
MONOCYTES NFR BLD: 7.8 %
NEUTROPHILS # BLD AUTO: 2.1 K/UL
NEUTROPHILS NFR BLD: 44.9 %
PLATELET # BLD AUTO: 265 K/UL
PMV BLD AUTO: 9.7 FL
RBC # BLD AUTO: 3.61 M/UL
WBC # BLD AUTO: 4.74 K/UL

## 2018-11-04 PROCEDURE — 85025 COMPLETE CBC W/AUTO DIFF WBC: CPT

## 2018-11-04 PROCEDURE — 93010 ELECTROCARDIOGRAM REPORT: CPT | Mod: ,,, | Performed by: INTERNAL MEDICINE

## 2018-11-04 PROCEDURE — 93005 ELECTROCARDIOGRAM TRACING: CPT

## 2018-11-04 PROCEDURE — 84484 ASSAY OF TROPONIN QUANT: CPT

## 2018-11-04 PROCEDURE — 96374 THER/PROPH/DIAG INJ IV PUSH: CPT

## 2018-11-04 PROCEDURE — 80053 COMPREHEN METABOLIC PANEL: CPT

## 2018-11-04 PROCEDURE — 83880 ASSAY OF NATRIURETIC PEPTIDE: CPT

## 2018-11-04 PROCEDURE — 99285 EMERGENCY DEPT VISIT HI MDM: CPT | Mod: 25

## 2018-11-04 RX ORDER — ASPIRIN 325 MG
325 TABLET ORAL
Status: COMPLETED | OUTPATIENT
Start: 2018-11-04 | End: 2018-11-05

## 2018-11-05 VITALS
TEMPERATURE: 98 F | WEIGHT: 293 LBS | BODY MASS INDEX: 39.68 KG/M2 | RESPIRATION RATE: 18 BRPM | DIASTOLIC BLOOD PRESSURE: 86 MMHG | HEART RATE: 61 BPM | OXYGEN SATURATION: 100 % | SYSTOLIC BLOOD PRESSURE: 166 MMHG | HEIGHT: 72 IN

## 2018-11-05 PROBLEM — I82.409 DVT (DEEP VENOUS THROMBOSIS): Status: ACTIVE | Noted: 2018-11-05

## 2018-11-05 LAB
ALBUMIN SERPL BCP-MCNC: 3.6 G/DL
ALP SERPL-CCNC: 61 U/L
ALT SERPL W/O P-5'-P-CCNC: 8 U/L
ANION GAP SERPL CALC-SCNC: 10 MMOL/L
AST SERPL-CCNC: 9 U/L
BILIRUB SERPL-MCNC: 0.2 MG/DL
BNP SERPL-MCNC: <10 PG/ML
BUN SERPL-MCNC: 12 MG/DL
CALCIUM SERPL-MCNC: 9.3 MG/DL
CHLORIDE SERPL-SCNC: 109 MMOL/L
CO2 SERPL-SCNC: 22 MMOL/L
CREAT SERPL-MCNC: 0.9 MG/DL
EST. GFR  (AFRICAN AMERICAN): >60 ML/MIN/1.73 M^2
EST. GFR  (NON AFRICAN AMERICAN): >60 ML/MIN/1.73 M^2
GLUCOSE SERPL-MCNC: 94 MG/DL
POTASSIUM SERPL-SCNC: 3.8 MMOL/L
PROT SERPL-MCNC: 7.6 G/DL
SODIUM SERPL-SCNC: 141 MMOL/L
TROPONIN I SERPL DL<=0.01 NG/ML-MCNC: <0.006 NG/ML
TROPONIN I SERPL DL<=0.01 NG/ML-MCNC: <0.006 NG/ML

## 2018-11-05 PROCEDURE — 25000003 PHARM REV CODE 250: Performed by: EMERGENCY MEDICINE

## 2018-11-05 PROCEDURE — 63600175 PHARM REV CODE 636 W HCPCS: Performed by: EMERGENCY MEDICINE

## 2018-11-05 PROCEDURE — 84484 ASSAY OF TROPONIN QUANT: CPT

## 2018-11-05 RX ORDER — KETOROLAC TROMETHAMINE 30 MG/ML
15 INJECTION, SOLUTION INTRAMUSCULAR; INTRAVENOUS
Status: COMPLETED | OUTPATIENT
Start: 2018-11-05 | End: 2018-11-05

## 2018-11-05 RX ADMIN — KETOROLAC TROMETHAMINE 15 MG: 30 INJECTION, SOLUTION INTRAMUSCULAR at 01:11

## 2018-11-05 RX ADMIN — ASPIRIN 325 MG ORAL TABLET 325 MG: 325 PILL ORAL at 12:11

## 2018-11-05 NOTE — ED TRIAGE NOTES
Pt arrives to er with children. States sob and coughing started yesterday before noon. Denies chest, nausea

## 2018-11-05 NOTE — ED PROVIDER NOTES
Encounter Date: 2018    SCRIBE #1 NOTE: I, Dexter Garcia, am scribing for, and in the presence of,  Karyn Garnica MD. I have scribed the following portions of the note - Other sections scribed: HPI, ROS, PE, MDM.       History     Chief Complaint   Patient presents with    Shortness of Breath     since yesterday, reports she believes she has something in her throat; pt appears in NAD; denies CP     CC: Shortness of Breath    38 year old female  has a past medical history of Arthritis and Hypertension presents to the ED for evaluation of shortness of breath, cough, and reproducible right sided chest pain. Pt also reports chronic bilateral leg swelling and right sided calf pain that began a few days ago. Pt is compliant w/ her medications. Pt is not on OCPs. She reports she has not seen a PCP as she recently moved here. No other symptoms reported.       The history is provided by the patient. No  was used.     Review of patient's allergies indicates:  No Known Allergies  Past Medical History:   Diagnosis Date    Arthritis     Hypertension      Past Surgical History:   Procedure Laterality Date     SECTION, LOW TRANSVERSE  2010    TUBAL LIGATION           Family History   Problem Relation Age of Onset    Hypertension Mother      Social History     Tobacco Use    Smoking status: Former Smoker     Years: 8.00     Types: Cigarettes    Smokeless tobacco: Never Used   Substance Use Topics    Alcohol use: Yes     Alcohol/week: 0.6 oz     Types: 1 Glasses of wine per week     Comment: occassion    Drug use: No     Comment: last smoked a couple of days ago     Review of Systems   Constitutional: Negative for fever.   HENT: Negative for sore throat.    Respiratory: Positive for cough and shortness of breath. Negative for choking and chest tightness.    Cardiovascular: Positive for chest pain (right sided reproducible w/ touch) and leg swelling.   Gastrointestinal:  Negative for abdominal pain and nausea.   Genitourinary: Negative for dysuria.   Musculoskeletal: Negative for back pain.        (+) right calf pain   Skin: Negative for rash.   Neurological: Negative for weakness.   Hematological: Does not bruise/bleed easily.       Physical Exam     Initial Vitals [11/04/18 2225]   BP Pulse Resp Temp SpO2   (!) 208/97 73 19 98.4 °F (36.9 °C) 98 %      MAP       --         Physical Exam    Nursing note and vitals reviewed.  Constitutional: She appears well-developed and well-nourished. She is not diaphoretic. No distress.   HENT:   Head: Normocephalic and atraumatic.   Nose: Nose normal.   Eyes: EOM are normal. Pupils are equal, round, and reactive to light. No scleral icterus.   Neck: Normal range of motion. Neck supple.   Cardiovascular: Normal rate, regular rhythm, normal heart sounds and intact distal pulses. Exam reveals no gallop and no friction rub.    No murmur heard.  Pulmonary/Chest: Breath sounds normal. No stridor. No respiratory distress. She has no wheezes. She has no rhonchi. She has no rales. She exhibits tenderness.   Abdominal: Soft. Normal appearance and bowel sounds are normal. She exhibits no distension. There is no tenderness. There is no rebound and no guarding.   Musculoskeletal: Normal range of motion. She exhibits edema (lower extremities) and tenderness (right calf).   Neurological: She is oriented to person, place, and time. No cranial nerve deficit.   Skin: Skin is warm and dry. No rash noted.   Psychiatric: She has a normal mood and affect. Her behavior is normal.         ED Course   Procedures  Labs Reviewed   CBC W/ AUTO DIFFERENTIAL - Abnormal; Notable for the following components:       Result Value    RBC 3.61 (*)     Hemoglobin 9.9 (*)     Hematocrit 31.0 (*)     MCHC 31.9 (*)     RDW 14.6 (*)     All other components within normal limits   COMPREHENSIVE METABOLIC PANEL - Abnormal; Notable for the following components:    CO2 22 (*)     AST 9 (*)      ALT 8 (*)     All other components within normal limits   TROPONIN I   B-TYPE NATRIURETIC PEPTIDE   TROPONIN I          Imaging Results          US Lower Extremity Veins Bilateral (Final result)  Result time 11/05/18 00:42:27    Final result by Chas Rodríguez MD (11/05/18 00:42:27)                 Impression:      No evidence of deep venous thrombosis in either lower extremity.    Left popliteal fossa Baker's cyst.      Electronically signed by: Chas Rodríguez MD  Date:    11/05/2018  Time:    00:42             Narrative:    EXAMINATION:  US LOWER EXTREMITY VEINS BILATERAL    CLINICAL HISTORY:  Acute embolism and thrombosis of unspecified deep veins of unspecified lower extremity.  Bilateral lower extremity edema and right-sided leg pain.    TECHNIQUE:  Duplex and color flow Doppler and dynamic compression was performed of the bilateral lower extremity veins was performed.    COMPARISON:  08/22/2018.    FINDINGS:  Right thigh veins: The common femoral, femoral, popliteal, upper greater saphenous, and deep femoral veins are patent and free of thrombus. The veins are normally compressible and have normal phasic flow and augmentation response.    Right calf veins: The visualized calf veins are patent.    Left thigh veins: The common femoral, femoral, popliteal, upper greater saphenous, and deep femoral veins are patent and free of thrombus. The veins are normally compressible and have normal phasic flow and augmentation response.    Left calf veins: The visualized calf veins are patent.    Miscellaneous: There is a 3.5 x 2.2 x 1.9 cm hypoechoic collection in the left popliteal fossa without internal vascularity compatible with a Baker's cyst, similar to the prior exam.                               X-Ray Chest PA And Lateral (Final result)  Result time 11/04/18 23:32:27    Final result by Yumiko Browning MD (11/04/18 23:32:27)                 Impression:      No radiographic evidence of acute intra-thoracic  process.      Electronically signed by: Yumiko Browning MD  Date:    11/04/2018  Time:    23:32             Narrative:    EXAMINATION:  XR CHEST PA AND LATERAL    CLINICAL HISTORY:  Chest pain    TECHNIQUE:  PA and lateral views of the chest were performed.    COMPARISON:  08/26/2018    FINDINGS:  The cardiomediastinal silhouette is within normal limits. The visualized airway is unremarkable.  The lungs appear symmetrically aerated without definite focal alveolar consolidation. No large pleural effusion or pneumothorax is appreciated.Visualized osseous structures demonstrate no acute abnormalities.                                 Medical Decision Making:   Initial Assessment:   Working diagnosis of chest pain associated with cough.  Differential Diagnosis:   Acute coronary syndrome  Chest wall tenderness  Bilateral DVTs    Clinical Tests:   Lab Tests: Ordered and Reviewed  Radiological Study: Ordered and Reviewed  ED Management:  38-year-old female comes to the emergency department because she has been having a dry cough that is associated with reproducible right-sided chest pain. Patient states that when she develops the chest pain she gets a little bit of shortness of breath. Upon physical examination I can observe that the patient's lower extremities are significantly swollen.  Patient states that this is has been going on but she has not receive follow-up for it.  Patient does report that she has had some recent right calf tenderness. Patient's bilateral lower extremity ultrasound did not show any evidence of DVTs.  I discussed with the patient that this evaluation in the emergency department does not suggest any emergent or life-threatening medical condition requiring admission or immediate intervention beyond what was provided in the ED today.  Regardless, an unremarkable evaluation in the ED does not preclude the development or presence of a serious or life threatening condition.  As such patient was instructed  to return immediately for any worsening or change in current symptoms.  Patient agrees and understands with the outpatient treatment and follow-up plan.      Additional MDM:   Heart Score:    History:          Slightly suspicious.  ECG:             Normal  Age:               Less than 45 years  Risk factors: no risk factors known  Troponin:       Less than or equal to normal limit  Final Score: 0             Scribe Attestation:   Scribe #1: I performed the above scribed service and the documentation accurately describes the services I performed. I attest to the accuracy of the note.    Attending Attestation:           Physician Attestation for Scribe:  Physician Attestation Statement for Scribe #1: I, Karyn Garnica MD, reviewed documentation, as scribed by Dexter Garcia in my presence, and it is both accurate and complete.                    Clinical Impression:   Diagnoses of Chest pain and DVT (deep venous thrombosis) were pertinent to this visit.                             Karyn Garnica MD  11/05/18 0230

## 2018-12-18 ENCOUNTER — HOSPITAL ENCOUNTER (EMERGENCY)
Facility: HOSPITAL | Age: 38
Discharge: HOME | End: 2018-12-18
Attending: EMERGENCY MEDICINE
Payer: MEDICAID

## 2018-12-18 VITALS
OXYGEN SATURATION: 99 % | HEART RATE: 71 BPM | SYSTOLIC BLOOD PRESSURE: 172 MMHG | DIASTOLIC BLOOD PRESSURE: 79 MMHG | TEMPERATURE: 99 F | RESPIRATION RATE: 18 BRPM | BODY MASS INDEX: 39.68 KG/M2 | HEIGHT: 72 IN | WEIGHT: 293 LBS

## 2018-12-18 DIAGNOSIS — R05.9 COUGH: ICD-10-CM

## 2018-12-18 DIAGNOSIS — R07.9 CHEST PAIN: ICD-10-CM

## 2018-12-18 LAB
ALBUMIN SERPL BCP-MCNC: 3.5 G/DL
ALP SERPL-CCNC: 56 U/L
ALT SERPL W/O P-5'-P-CCNC: 9 U/L
ANION GAP SERPL CALC-SCNC: 9 MMOL/L
AST SERPL-CCNC: 16 U/L
B-HCG UR QL: NEGATIVE
BASOPHILS # BLD AUTO: 0.01 K/UL
BASOPHILS NFR BLD: 0.2 %
BILIRUB SERPL-MCNC: 0.6 MG/DL
BUN SERPL-MCNC: 8 MG/DL
CALCIUM SERPL-MCNC: 8.9 MG/DL
CHLORIDE SERPL-SCNC: 106 MMOL/L
CO2 SERPL-SCNC: 23 MMOL/L
CREAT SERPL-MCNC: 0.9 MG/DL
CTP QC/QA: YES
DIFFERENTIAL METHOD: ABNORMAL
EOSINOPHIL # BLD AUTO: 0 K/UL
EOSINOPHIL NFR BLD: 0.7 %
ERYTHROCYTE [DISTWIDTH] IN BLOOD BY AUTOMATED COUNT: 15.7 %
EST. GFR  (AFRICAN AMERICAN): >60 ML/MIN/1.73 M^2
EST. GFR  (NON AFRICAN AMERICAN): >60 ML/MIN/1.73 M^2
GLUCOSE SERPL-MCNC: 85 MG/DL
HCT VFR BLD AUTO: 32.9 %
HGB BLD-MCNC: 10.4 G/DL
LYMPHOCYTES # BLD AUTO: 1.3 K/UL
LYMPHOCYTES NFR BLD: 29.8 %
MCH RBC QN AUTO: 26.2 PG
MCHC RBC AUTO-ENTMCNC: 31.6 G/DL
MCV RBC AUTO: 83 FL
MONOCYTES # BLD AUTO: 0.5 K/UL
MONOCYTES NFR BLD: 12 %
NEUTROPHILS # BLD AUTO: 2.4 K/UL
NEUTROPHILS NFR BLD: 57.3 %
PLATELET # BLD AUTO: 199 K/UL
PMV BLD AUTO: 10.6 FL
POTASSIUM SERPL-SCNC: 4.5 MMOL/L
PROT SERPL-MCNC: 8 G/DL
RBC # BLD AUTO: 3.97 M/UL
SODIUM SERPL-SCNC: 138 MMOL/L
WBC # BLD AUTO: 4.26 K/UL

## 2018-12-18 PROCEDURE — 80053 COMPREHEN METABOLIC PANEL: CPT

## 2018-12-18 PROCEDURE — 25000003 PHARM REV CODE 250: Performed by: PHYSICIAN ASSISTANT

## 2018-12-18 PROCEDURE — 93010 ELECTROCARDIOGRAM REPORT: CPT | Mod: ,,, | Performed by: INTERNAL MEDICINE

## 2018-12-18 PROCEDURE — 93005 ELECTROCARDIOGRAM TRACING: CPT

## 2018-12-18 PROCEDURE — 85025 COMPLETE CBC W/AUTO DIFF WBC: CPT

## 2018-12-18 PROCEDURE — 99285 EMERGENCY DEPT VISIT HI MDM: CPT | Mod: 25

## 2018-12-18 PROCEDURE — 81025 URINE PREGNANCY TEST: CPT | Performed by: PHYSICIAN ASSISTANT

## 2018-12-18 RX ORDER — IBUPROFEN 400 MG/1
400 TABLET ORAL
Status: COMPLETED | OUTPATIENT
Start: 2018-12-18 | End: 2018-12-18

## 2018-12-18 RX ADMIN — IBUPROFEN 400 MG: 400 TABLET, FILM COATED ORAL at 11:12

## 2018-12-18 NOTE — ED TRIAGE NOTES
Patient reports generalized body aches x 1 day.  Denies NVD.  Patient also reports right sided CP x 1 day.

## 2018-12-18 NOTE — ED PROVIDER NOTES
Encounter Date: 2018    SCRIBE #1 NOTE: ICristina, am scribing for, and in the presence of,  Apple Tanner PA-C. I have scribed the following portions of the note - Other sections scribed: HPI/ROS.       History     Chief Complaint   Patient presents with    Chest Pain     all over body aches, fatigue, congestion, chest pains started yesterday.   chest pains to right side of chest, non radiating, worse with breathing and movement.  dry cough, nasal congestion.   denies n/v/d or fever.     CC: Fatigue     HPI: This 38 y.o. female with a medical hx of anemia and HTN presents to the ED for an evaluation of acute onset, severe (8/10) generalized body aches, fatigue, and mild sore throat since yesterday. Pt also reports R-sided chest pain that began this morning. No prior at home tx. No alleviating factors. Otherwise, pt denies fever, chills, cough, chest congestion, n/v/d, LOC, dysuria, abdominal pain, and any other associated symptoms.       The history is provided by the patient. No  was used.     Review of patient's allergies indicates:  No Known Allergies  Past Medical History:   Diagnosis Date    Arthritis     Hypertension      Past Surgical History:   Procedure Laterality Date     SECTION, LOW TRANSVERSE  2010    TUBAL LIGATION           Family History   Problem Relation Age of Onset    Hypertension Mother      Social History     Tobacco Use    Smoking status: Former Smoker     Years: 8.00     Types: Cigarettes    Smokeless tobacco: Never Used   Substance Use Topics    Alcohol use: Yes     Alcohol/week: 0.6 oz     Types: 1 Glasses of wine per week     Comment: occassion    Drug use: No     Comment: last smoked a couple of days ago     Review of Systems   Constitutional: Positive for fatigue. Negative for chills and fever.   HENT: Positive for sore throat (mild). Negative for congestion, ear pain, rhinorrhea and trouble swallowing.    Eyes: Negative  for pain and visual disturbance.   Respiratory: Negative for cough and shortness of breath.    Cardiovascular: Positive for chest pain (R-sided).   Gastrointestinal: Negative for abdominal pain, diarrhea, nausea and vomiting.   Genitourinary: Negative for decreased urine volume, dysuria, vaginal discharge and vaginal pain.   Musculoskeletal: Positive for myalgias (generalized). Negative for back pain and neck pain.   Skin: Negative for rash.   Neurological: Negative for headaches.   Psychiatric/Behavioral: Negative for confusion.   All other systems reviewed and are negative.      Physical Exam     Initial Vitals [12/18/18 0920]   BP Pulse Resp Temp SpO2   (!) 187/93 70 18 98.5 °F (36.9 °C) 100 %      MAP       --         Physical Exam    Nursing note and vitals reviewed.  Constitutional: Vital signs are normal. She appears well-developed and well-nourished. She is not diaphoretic. She is cooperative.  Non-toxic appearance. She does not have a sickly appearance. She does not appear ill. No distress.   HENT:   Head: Normocephalic and atraumatic.   Right Ear: Tympanic membrane, external ear and ear canal normal.   Left Ear: Tympanic membrane, external ear and ear canal normal.   Nose: Nose normal.   Mouth/Throat: Uvula is midline, oropharynx is clear and moist and mucous membranes are normal. No trismus in the jaw. No dental abscesses or uvula swelling. No oropharyngeal exudate, posterior oropharyngeal edema, posterior oropharyngeal erythema or tonsillar abscesses.   Eyes: Conjunctivae, EOM and lids are normal. Pupils are equal, round, and reactive to light.   Neck: Trachea normal, normal range of motion, full passive range of motion without pain and phonation normal. Neck supple.   Cardiovascular: Normal rate, regular rhythm, normal heart sounds and intact distal pulses. Exam reveals no gallop and no friction rub.    No murmur heard.  Pulmonary/Chest: Effort normal and breath sounds normal. No respiratory distress.  She has no decreased breath sounds. She has no wheezes. She has no rhonchi. She has no rales. Chest wall is not dull to percussion. She exhibits no mass, no tenderness, no laceration, no crepitus, no edema, no deformity, no swelling and no retraction.   Abdominal: Soft. Normal appearance and bowel sounds are normal. She exhibits no distension and no mass. There is no tenderness. There is no rigidity, no rebound, no guarding and no CVA tenderness.   Musculoskeletal: Normal range of motion.   Neurological: She is alert and oriented to person, place, and time. She has normal strength.   Skin: Skin is warm and dry. Capillary refill takes less than 2 seconds. No rash noted.   Psychiatric: She has a normal mood and affect. Her speech is normal and behavior is normal. Judgment and thought content normal. Cognition and memory are normal.         ED Course   Procedures  Labs Reviewed   CBC W/ AUTO DIFFERENTIAL - Abnormal; Notable for the following components:       Result Value    RBC 3.97 (*)     Hemoglobin 10.4 (*)     Hematocrit 32.9 (*)     MCH 26.2 (*)     MCHC 31.6 (*)     RDW 15.7 (*)     All other components within normal limits   COMPREHENSIVE METABOLIC PANEL - Abnormal; Notable for the following components:    ALT 9 (*)     All other components within normal limits   POCT URINE PREGNANCY          Imaging Results          X-Ray Chest PA And Lateral (Final result)  Result time 12/18/18 09:49:52    Final result by Jose Alfredo Thorne MD (12/18/18 09:49:52)                 Impression:      No acute abnormality.      Electronically signed by: Jose Alfredo Thorne MD  Date:    12/18/2018  Time:    09:49             Narrative:    EXAMINATION:  XR CHEST PA AND LATERAL    CLINICAL HISTORY:  Cough    TECHNIQUE:  PA and lateral views of the chest were performed.    COMPARISON:  11/04/2018    FINDINGS:  The lungs are clear, with normal appearance of pulmonary vasculature and no pleural effusion or pneumothorax.    The cardiac  silhouette is normal in size. The hilar and mediastinal contours are unremarkable.    Bones are intact.                                       APC / Resident Notes:   This is an evaluation of a 38 y.o. female with PMHx anemia, HTN, arthritis that presents to the Emergency Department for fatigue. Patient reports intermittent fatigue, myalgias and sore throat x several days. She also reports right sided chest pain that began this morning. She denies sinus pressure, sinus congestion, difficulty swallowing, oral swelling, otalgia, cough, SOB, abdominal pain, N/V/D/C, urinary complaints, vaginal complaints or further symptoms.     Exam findings: Patient is non-toxic, afebrile and well appearing.  Mucous membranes are pink and moist.  No sinus tenderness to palpation.  The posterior oropharynx is clear.  TMs clear bilaterally. Neck is supple. No meningeal signs. Lungs clear to auscultation bilaterally, no wheezing, rales or rhonchi.  No evidence of acute respiratory distress. Regular rate and rhythm, no murmurs.  There is no tenderness palpation of the chest wall.  Abdomen is soft and nontender. Bowel sounds appreciated. No peritoneal signs. No CVA tenderness. No rashes. Patient is ambulatory without assistance.    If available, past records have been reviewed.  Vitals are reassuring.  Results:   UPT negative  EKG shows normal sinus rhythm without ischemia or dysrhythmia  Chest x-ray is unrevealing for pneumonia, pneumothorax, pleural effusion, rib fracture  CBC unrevealing for severe anemia  CMP unrevealing for electrolyte abnormality, transaminitis, acute kidney injury  No evidence of orthostatic hypotension.     My overall impression: chest pain   DDx: chest pain, costochondritis, pneumonia, pneumothorax, pleural effusion, rib fracture, orthostatic hypotension, viral syndrome, anemia, other    ED course: Motrin given for pain. I will recommend NSAIDs and heating pads.  I will recommend avoidance of strenuous physical  activity.  I will recommend close follow-up with primary care.  Patient is stable for discharge. ED return precautions given.    The diagnosis and treatment plan have been discussed with the patient. All questions and concerns have been addressed. Patient expressed understanding. An educational information sheet was given to the patient prior to discharge.     Apple Tanner PA-C         Scrbela Attestation:   Scribe #1: I performed the above scribed service and the documentation accurately describes the services I performed. I attest to the accuracy of the note.    Attending Attestation:           Physician Attestation for Scribe:  Physician Attestation Statement for Scribe #1: I, Apple Tanner PA-C, reviewed documentation, as scribed by Cristina Pham in my presence, and it is both accurate and complete.                    Clinical Impression:   Diagnoses of Chest pain and Cough were pertinent to this visit.      Disposition:   Disposition: Discharged  Condition: Stable                        Apple Tanner PA-C  12/18/18 [id number]

## 2018-12-18 NOTE — DISCHARGE INSTRUCTIONS
Make sure you are drinking plenty of fluids and getting rest.    You can take Motrin or Tylenol for pain.    Schedule a follow-up appointment with primary care if symptoms continue.    Return to the ER for any concerns.

## 2019-02-07 ENCOUNTER — HOSPITAL ENCOUNTER (EMERGENCY)
Facility: HOSPITAL | Age: 39
Discharge: HOME | End: 2019-02-07
Attending: EMERGENCY MEDICINE
Payer: MEDICAID

## 2019-02-07 VITALS
HEART RATE: 63 BPM | BODY MASS INDEX: 39.68 KG/M2 | WEIGHT: 293 LBS | SYSTOLIC BLOOD PRESSURE: 143 MMHG | OXYGEN SATURATION: 99 % | HEIGHT: 72 IN | TEMPERATURE: 98 F | RESPIRATION RATE: 16 BRPM | DIASTOLIC BLOOD PRESSURE: 63 MMHG

## 2019-02-07 DIAGNOSIS — E66.01 MORBID OBESITY: Primary | ICD-10-CM

## 2019-02-07 DIAGNOSIS — F41.9 ANXIETY: ICD-10-CM

## 2019-02-07 DIAGNOSIS — R07.9 CHEST PAIN: ICD-10-CM

## 2019-02-07 DIAGNOSIS — R60.1 GENERALIZED EDEMA: ICD-10-CM

## 2019-02-07 LAB
ALBUMIN SERPL BCP-MCNC: 3.2 G/DL
ALP SERPL-CCNC: 63 U/L
ALT SERPL W/O P-5'-P-CCNC: 9 U/L
ANION GAP SERPL CALC-SCNC: 7 MMOL/L
AST SERPL-CCNC: 12 U/L
BASOPHILS # BLD AUTO: 0.02 K/UL
BASOPHILS NFR BLD: 0.4 %
BILIRUB SERPL-MCNC: 0.2 MG/DL
BNP SERPL-MCNC: 22 PG/ML
BUN SERPL-MCNC: 9 MG/DL
CALCIUM SERPL-MCNC: 8.6 MG/DL
CHLORIDE SERPL-SCNC: 108 MMOL/L
CO2 SERPL-SCNC: 26 MMOL/L
CREAT SERPL-MCNC: 0.8 MG/DL
D DIMER PPP IA.FEU-MCNC: 0.61 MG/L FEU
DIFFERENTIAL METHOD: ABNORMAL
EOSINOPHIL # BLD AUTO: 0.1 K/UL
EOSINOPHIL NFR BLD: 1.5 %
ERYTHROCYTE [DISTWIDTH] IN BLOOD BY AUTOMATED COUNT: 16.5 %
EST. GFR  (AFRICAN AMERICAN): >60 ML/MIN/1.73 M^2
EST. GFR  (NON AFRICAN AMERICAN): >60 ML/MIN/1.73 M^2
GLUCOSE SERPL-MCNC: 94 MG/DL
HCT VFR BLD AUTO: 29.4 %
HGB BLD-MCNC: 9.2 G/DL
LYMPHOCYTES # BLD AUTO: 2.2 K/UL
LYMPHOCYTES NFR BLD: 46.9 %
MCH RBC QN AUTO: 26.7 PG
MCHC RBC AUTO-ENTMCNC: 31.3 G/DL
MCV RBC AUTO: 86 FL
MONOCYTES # BLD AUTO: 0.4 K/UL
MONOCYTES NFR BLD: 8.7 %
NEUTROPHILS # BLD AUTO: 2 K/UL
NEUTROPHILS NFR BLD: 42.5 %
PLATELET # BLD AUTO: 266 K/UL
PMV BLD AUTO: 9.7 FL
POTASSIUM SERPL-SCNC: 3.7 MMOL/L
PROT SERPL-MCNC: 6.9 G/DL
RBC # BLD AUTO: 3.44 M/UL
SODIUM SERPL-SCNC: 141 MMOL/L
TROPONIN I SERPL DL<=0.01 NG/ML-MCNC: <0.006 NG/ML
WBC # BLD AUTO: 4.61 K/UL

## 2019-02-07 PROCEDURE — 84484 ASSAY OF TROPONIN QUANT: CPT

## 2019-02-07 PROCEDURE — 85025 COMPLETE CBC W/AUTO DIFF WBC: CPT

## 2019-02-07 PROCEDURE — 93010 EKG 12-LEAD: ICD-10-PCS | Mod: ,,, | Performed by: INTERNAL MEDICINE

## 2019-02-07 PROCEDURE — 85379 FIBRIN DEGRADATION QUANT: CPT

## 2019-02-07 PROCEDURE — 83880 ASSAY OF NATRIURETIC PEPTIDE: CPT

## 2019-02-07 PROCEDURE — 80053 COMPREHEN METABOLIC PANEL: CPT

## 2019-02-07 PROCEDURE — 93005 ELECTROCARDIOGRAM TRACING: CPT

## 2019-02-07 PROCEDURE — 99285 EMERGENCY DEPT VISIT HI MDM: CPT

## 2019-02-07 PROCEDURE — 93010 ELECTROCARDIOGRAM REPORT: CPT | Mod: ,,, | Performed by: INTERNAL MEDICINE

## 2019-02-07 NOTE — ED PROVIDER NOTES
Encounter Date: 2019       History     Chief Complaint   Patient presents with    Fatigue     reports generalized fatigue and leg tightness that has been going on for the last couple of days, also reports some intermittent chest tightness.      Patient presents for evaluation of fatigue and shortness of breath that began gradually over a 2 day period.  Patient has history of chronic peripheral edema and morbid obesity.  She states that her edema is been worse normal for 2-3 days.  She admits she has chronic edema. She denies calf pain. She also has a history DVT on 2 separate occasions in 2018.  She has been off her Eliquis for 4 months.  Patient states that today while driving she developed acute onset of left-sided chest tightness with palpitations.  No syncope.  No diaphoresis.  No nausea or vomiting. The symptoms have since resolved.  Symptoms lasted approximately 10-20 minutes.  Patient states that she has never had symptoms like this before.  However patient has been evaluated in this emergency department multiple times for chest pain. She has had multiple venous Doppler bilateral lower extremities in the last year.  Most recently there has been no DVT identified.  She has also had 2 CT a is a thorax within the last year and a half that have been negative for pulmonary embolism.  Her D-dimer has been chronically elevated.  She has documented history of noncompliance with her Eliquis and previous charts.  She denies cough or fever.  She denies abdominal pain or back pain.          Review of patient's allergies indicates:  No Known Allergies  Past Medical History:   Diagnosis Date    Arthritis     Hypertension      Past Surgical History:   Procedure Laterality Date     SECTION, LOW TRANSVERSE  2010    TUBAL LIGATION           Family History   Problem Relation Age of Onset    Hypertension Mother      Social History     Tobacco Use    Smoking status: Former Smoker     Years: 8.00      Types: Cigarettes    Smokeless tobacco: Never Used   Substance Use Topics    Alcohol use: Yes     Alcohol/week: 0.6 oz     Types: 1 Glasses of wine per week     Comment: occassion    Drug use: No     Comment: last smoked a couple of days ago     Review of Systems   Constitutional: Positive for fatigue. Negative for chills, diaphoresis and fever.   HENT: Negative for congestion, rhinorrhea, sinus pain and sore throat.    Eyes: Negative for visual disturbance.   Respiratory: Positive for chest tightness and shortness of breath. Negative for cough and stridor.    Cardiovascular: Positive for chest pain, palpitations and leg swelling.   Gastrointestinal: Negative for abdominal pain, diarrhea, nausea and vomiting.        No melena.   Genitourinary: Negative for dysuria, flank pain, hematuria and urgency.   Skin: Negative for rash and wound.   Neurological: Positive for light-headedness. Negative for dizziness, tremors, seizures, syncope, facial asymmetry, speech difficulty, weakness, numbness and headaches.   Psychiatric/Behavioral: Negative for confusion. The patient is nervous/anxious.    All other systems reviewed and are negative.      Physical Exam     Initial Vitals [02/07/19 0213]   BP Pulse Resp Temp SpO2   (!) 177/89 72 18 98 °F (36.7 °C) 100 %      MAP       --         Physical Exam    Nursing note and vitals reviewed.  Constitutional: She appears well-developed and well-nourished. She is not diaphoretic. No distress.   Morbidly obese.   HENT:   Head: Normocephalic and atraumatic.   Nose: Nose normal.   Mouth/Throat: Oropharynx is clear and moist.   Eyes: Conjunctivae and EOM are normal. Pupils are equal, round, and reactive to light. Right eye exhibits no discharge. Left eye exhibits no discharge.   Neck: Normal range of motion. Neck supple. No thyromegaly present.   Cardiovascular: Normal rate, regular rhythm and normal heart sounds.   No murmur heard.  Pulmonary/Chest: Breath sounds normal. No stridor. No  respiratory distress. She has no wheezes. She has no rhonchi. She has no rales. She exhibits no tenderness.   Abdominal: Soft. She exhibits no distension and no mass. There is no tenderness. There is no rebound and no guarding.   Musculoskeletal: Normal range of motion. She exhibits edema (For +nonpitting edema of the lower extremities to the knees.). She exhibits no tenderness.   Neurological: She is alert and oriented to person, place, and time. She has normal strength. No cranial nerve deficit.   Skin: Skin is warm and dry. No rash noted. No erythema.   Psychiatric: She has a normal mood and affect. Her behavior is normal. Judgment and thought content normal.         ED Course   Procedures  Labs Reviewed   CBC W/ AUTO DIFFERENTIAL   COMPREHENSIVE METABOLIC PANEL   TROPONIN I   B-TYPE NATRIURETIC PEPTIDE   D DIMER, QUANTITATIVE     EKG Readings: (Independently Interpreted)   Initial Reading: No STEMI. Rhythm: Sinus Bradycardia. Heart Rate: 57. Ectopy: No Ectopy. Conduction: Normal. ST Segments: Normal ST Segments. T Waves: Normal. Axis: Normal.       Imaging Results          X-Ray Chest PA And Lateral (Final result)  Result time 02/07/19 02:59:31    Final result by Britany Monzon MD (02/07/19 02:59:31)                 Impression:      No acute abnormality.      Electronically signed by: Britany Monzon  Date:    02/07/2019  Time:    02:59             Narrative:    EXAMINATION:  XR CHEST PA AND LATERAL    CLINICAL HISTORY:  Chest Pain;    TECHNIQUE:  PA and lateral views of the chest were performed.    COMPARISON:  12/18/2018    FINDINGS:  The lungs are clear, with normal appearance of pulmonary vasculature and no pleural effusion or pneumothorax.    The cardiac silhouette is normal in size. The hilar and mediastinal contours are unremarkable.    Bones are intact. Overlying cardiac leads are noted.                                 Medical Decision Making:   Differential Diagnosis:   CHF, anxiety, cardiac  arrhythmia, VTE, ACS.  Clinical Tests:   Lab Tests: Reviewed  The following lab test(s) were unremarkable: CBC, CMP, Troponin, BNP and D-Dimer  Radiological Study: Reviewed  Medical Tests: Reviewed  ED Management:  Patient remains asymptomatic this time.  D-dimer is mildly elevated but is unchanged from previous.  Considering the patient had a negative workup for venous thromboembolic disease in August when she had similar D-dimer in level, I do not think the patient needs any further imaging today.  Patient has leg edema is chronic and unchanged.  Her chest pain most likely represents anxiety given it was brief and associated with palpitations and a feeling of anxiety. Patient's heart rate is not elevated at this time.  There is no signs of congestive heart failure or acute coronary syndrome on her evaluation.  Believe patient is stable to follow up outpatient for further testing or risk stratification.  Heart score is 1.    Additional MDM:   Heart Score:    History:          Slightly suspicious.  ECG:             Normal  Age:               Less than 45 years  Risk factors: 1-2 risk factors  Troponin:       Less than or equal to normal limit  Final Score: 1                       Clinical Impression:   The primary encounter diagnosis was Morbid obesity. Diagnoses of Chest pain, Anxiety, and Generalized edema were also pertinent to this visit.      Disposition:   Disposition: Discharged  Condition: Stable                        Lázaro Cevallos MD  02/07/19 0429

## 2019-02-07 NOTE — DISCHARGE INSTRUCTIONS
Follow up the primary care physician to determine further treatment with anticoagulants.  Recommend Cardiology follow-up for further risk stratification for intermittent chest pain.

## 2019-02-07 NOTE — ED TRIAGE NOTES
Patient presents to the ER via EMS. Patient presents with chest pain (tightness), and heaviness in bilateral lower extremities. Reports the chest pain started today but the leg pain started a week ago. Denies SOB.

## 2019-05-15 ENCOUNTER — HOSPITAL ENCOUNTER (EMERGENCY)
Facility: HOSPITAL | Age: 39
Discharge: PSYCHIATRIC OR SUBSTANCE USE FACILITY | End: 2019-05-15
Attending: EMERGENCY MEDICINE
Payer: MEDICAID

## 2019-05-15 VITALS
HEIGHT: 72 IN | HEART RATE: 69 BPM | TEMPERATURE: 98 F | OXYGEN SATURATION: 97 % | WEIGHT: 293 LBS | DIASTOLIC BLOOD PRESSURE: 89 MMHG | RESPIRATION RATE: 17 BRPM | SYSTOLIC BLOOD PRESSURE: 141 MMHG | BODY MASS INDEX: 39.68 KG/M2

## 2019-05-15 DIAGNOSIS — R45.89 SUICIDAL BEHAVIOR: Primary | ICD-10-CM

## 2019-05-15 DIAGNOSIS — R00.2 PALPITATIONS: ICD-10-CM

## 2019-05-15 PROBLEM — F33.2 MAJOR DEPRESSIVE DISORDER, RECURRENT EPISODE, SEVERE: Status: ACTIVE | Noted: 2019-05-15

## 2019-05-15 PROBLEM — F32.A DEPRESSION: Status: ACTIVE | Noted: 2019-05-15

## 2019-05-15 LAB
ALBUMIN SERPL BCP-MCNC: 3.2 G/DL (ref 3.5–5.2)
ALP SERPL-CCNC: 51 U/L (ref 55–135)
ALT SERPL W/O P-5'-P-CCNC: <5 U/L (ref 10–44)
AMPHET+METHAMPHET UR QL: NEGATIVE
ANION GAP SERPL CALC-SCNC: 5 MMOL/L (ref 8–16)
APAP SERPL-MCNC: <3 UG/ML (ref 10–20)
AST SERPL-CCNC: 10 U/L (ref 10–40)
B-HCG UR QL: NEGATIVE
BACTERIA #/AREA URNS HPF: ABNORMAL /HPF
BARBITURATES UR QL SCN>200 NG/ML: NEGATIVE
BASOPHILS # BLD AUTO: 0.01 K/UL (ref 0–0.2)
BASOPHILS NFR BLD: 0.2 % (ref 0–1.9)
BENZODIAZ UR QL SCN>200 NG/ML: NEGATIVE
BILIRUB SERPL-MCNC: 0.3 MG/DL (ref 0.1–1)
BILIRUB UR QL STRIP: NEGATIVE
BUN SERPL-MCNC: 9 MG/DL (ref 6–20)
BZE UR QL SCN: NEGATIVE
CALCIUM SERPL-MCNC: 9.1 MG/DL (ref 8.7–10.5)
CANNABINOIDS UR QL SCN: NORMAL
CAOX CRY URNS QL MICRO: ABNORMAL
CHLORIDE SERPL-SCNC: 108 MMOL/L (ref 95–110)
CLARITY UR: ABNORMAL
CO2 SERPL-SCNC: 27 MMOL/L (ref 23–29)
COLOR UR: YELLOW
CREAT SERPL-MCNC: 0.8 MG/DL (ref 0.5–1.4)
CREAT UR-MCNC: 214.4 MG/DL (ref 15–325)
CTP QC/QA: YES
DIFFERENTIAL METHOD: ABNORMAL
EOSINOPHIL # BLD AUTO: 0 K/UL (ref 0–0.5)
EOSINOPHIL NFR BLD: 0.7 % (ref 0–8)
ERYTHROCYTE [DISTWIDTH] IN BLOOD BY AUTOMATED COUNT: 15.8 % (ref 11.5–14.5)
EST. GFR  (AFRICAN AMERICAN): >60 ML/MIN/1.73 M^2
EST. GFR  (NON AFRICAN AMERICAN): >60 ML/MIN/1.73 M^2
ETHANOL SERPL-MCNC: <10 MG/DL
GLUCOSE SERPL-MCNC: 94 MG/DL (ref 70–110)
GLUCOSE UR QL STRIP: NEGATIVE
HCT VFR BLD AUTO: 33.2 % (ref 37–48.5)
HGB BLD-MCNC: 10.4 G/DL (ref 12–16)
HGB UR QL STRIP: NEGATIVE
KETONES UR QL STRIP: NEGATIVE
LEUKOCYTE ESTERASE UR QL STRIP: ABNORMAL
LYMPHOCYTES # BLD AUTO: 1.7 K/UL (ref 1–4.8)
LYMPHOCYTES NFR BLD: 40.4 % (ref 18–48)
MCH RBC QN AUTO: 27.4 PG (ref 27–31)
MCHC RBC AUTO-ENTMCNC: 31.3 G/DL (ref 32–36)
MCV RBC AUTO: 88 FL (ref 82–98)
METHADONE UR QL SCN>300 NG/ML: NEGATIVE
MICROSCOPIC COMMENT: ABNORMAL
MONOCYTES # BLD AUTO: 0.4 K/UL (ref 0.3–1)
MONOCYTES NFR BLD: 10.1 % (ref 4–15)
NEUTROPHILS # BLD AUTO: 2.1 K/UL (ref 1.8–7.7)
NEUTROPHILS NFR BLD: 48.6 % (ref 38–73)
NITRITE UR QL STRIP: NEGATIVE
OPIATES UR QL SCN: NEGATIVE
PCP UR QL SCN>25 NG/ML: NEGATIVE
PH UR STRIP: 6 [PH] (ref 5–8)
PLATELET # BLD AUTO: 259 K/UL (ref 150–350)
PMV BLD AUTO: 9.9 FL (ref 9.2–12.9)
POTASSIUM SERPL-SCNC: 4.1 MMOL/L (ref 3.5–5.1)
PROT SERPL-MCNC: 6.9 G/DL (ref 6–8.4)
PROT UR QL STRIP: NEGATIVE
RBC # BLD AUTO: 3.79 M/UL (ref 4–5.4)
SODIUM SERPL-SCNC: 140 MMOL/L (ref 136–145)
SP GR UR STRIP: 1.02 (ref 1–1.03)
SQUAMOUS #/AREA URNS HPF: 10 /HPF
TOXICOLOGY INFORMATION: NORMAL
TSH SERPL DL<=0.005 MIU/L-ACNC: 1.22 UIU/ML (ref 0.4–4)
URN SPEC COLLECT METH UR: ABNORMAL
UROBILINOGEN UR STRIP-ACNC: NEGATIVE EU/DL
WBC # BLD AUTO: 4.26 K/UL (ref 3.9–12.7)
WBC #/AREA URNS HPF: 10 /HPF (ref 0–5)

## 2019-05-15 PROCEDURE — 80320 DRUG SCREEN QUANTALCOHOLS: CPT

## 2019-05-15 PROCEDURE — 80329 ANALGESICS NON-OPIOID 1 OR 2: CPT

## 2019-05-15 PROCEDURE — 93005 ELECTROCARDIOGRAM TRACING: CPT

## 2019-05-15 PROCEDURE — 25000003 PHARM REV CODE 250: Performed by: EMERGENCY MEDICINE

## 2019-05-15 PROCEDURE — 81000 URINALYSIS NONAUTO W/SCOPE: CPT | Mod: 59

## 2019-05-15 PROCEDURE — 85025 COMPLETE CBC W/AUTO DIFF WBC: CPT

## 2019-05-15 PROCEDURE — 84443 ASSAY THYROID STIM HORMONE: CPT

## 2019-05-15 PROCEDURE — 93010 ELECTROCARDIOGRAM REPORT: CPT | Mod: ,,, | Performed by: INTERNAL MEDICINE

## 2019-05-15 PROCEDURE — 93010 EKG 12-LEAD: ICD-10-PCS | Mod: ,,, | Performed by: INTERNAL MEDICINE

## 2019-05-15 PROCEDURE — 99285 EMERGENCY DEPT VISIT HI MDM: CPT | Mod: 25

## 2019-05-15 PROCEDURE — 96360 HYDRATION IV INFUSION INIT: CPT

## 2019-05-15 PROCEDURE — 90792 PSYCH DIAG EVAL W/MED SRVCS: CPT | Mod: AF,HB,, | Performed by: PSYCHIATRY & NEUROLOGY

## 2019-05-15 PROCEDURE — 81025 URINE PREGNANCY TEST: CPT | Performed by: EMERGENCY MEDICINE

## 2019-05-15 PROCEDURE — 90792 PR PSYCHIATRIC DIAGNOSTIC EVALUATION W/MEDICAL SERVICES: ICD-10-PCS | Mod: AF,HB,, | Performed by: PSYCHIATRY & NEUROLOGY

## 2019-05-15 PROCEDURE — 80307 DRUG TEST PRSMV CHEM ANLYZR: CPT

## 2019-05-15 PROCEDURE — 80053 COMPREHEN METABOLIC PANEL: CPT

## 2019-05-15 RX ORDER — LORAZEPAM 0.5 MG/1
1 TABLET ORAL
Status: COMPLETED | OUTPATIENT
Start: 2019-05-15 | End: 2019-05-15

## 2019-05-15 RX ADMIN — LORAZEPAM 1 MG: 0.5 TABLET ORAL at 01:05

## 2019-05-15 RX ADMIN — SODIUM CHLORIDE 1000 ML: 0.9 INJECTION, SOLUTION INTRAVENOUS at 11:05

## 2019-05-15 NOTE — SUBJECTIVE & OBJECTIVE
Patient History           Medical as of 5/15/2019     Past Medical History     Diagnosis Date Comments Source    Arthritis -- -- Provider    DVT (deep venous thrombosis) -- -- Provider    Hypertension -- -- Provider          Pertinent Negatives     Diagnosis Date Noted Comments Source    Asthma 2012 -- Provider    CHF (congestive heart failure) 2015 -- Provider    Coronary artery disease 2015 -- Provider    Diabetes mellitus 2012 -- Provider    GERD (gastroesophageal reflux disease) 2015 -- Provider    History of psychiatric hospitalization 05/15/2019 -- Provider    Hx of psychiatric care 05/15/2019 -- Provider    Migraine headache 05/15/2019 -- Provider    Psychiatric problem 05/15/2019 -- Provider    Seizures 2015 -- Provider    Stroke 2015 -- Provider    Suicide attempt 05/15/2019 -- Provider    Therapy 05/15/2019 -- Provider                  Surgical as of 5/15/2019     Past Surgical History     Procedure Laterality Date Comments Source     SECTION, LOW TRANSVERSE -- 2010 -- Provider    TUBAL LIGATION -- --  Provider                  Family as of 5/15/2019     Problem Relation Name Age of Onset Comments Source    Hypertension Mother -- -- -- Provider            Tobacco Use as of 5/15/2019     Smoking Status Smoking Start Date Smoking Quit Date Packs/Day Years Used    Former Smoker -- -- -- 8.00    Types Comments Smokeless Tobacco Status Smokeless Tobacco Quit Date Source     Cigarettes -- Never Used -- Provider            Alcohol Use as of 5/15/2019     Alcohol Use Drinks/Week Alcohol/Week Comments Source    Never 1 Glasses of wine 0.6 oz -- Provider    Frequency Standard Drinks Binge Drinking        Never -- --              Drug Use as of 5/15/2019     Drug Use Types Frequency Comments Source    Yes  Marijuana -- at first denies but then says that she last smoked a couple of days ago Provider            Sexual Activity as of 5/15/2019     Sexually  "Active Birth Control Partners Comments Source    Not Currently -- -- -- Provider            Activities of Daily Living as of 5/15/2019     Activities of Daily Living Question Response Comments Source    Patient feels they ought to cut down on drinking/drug use Not Asked -- Provider    Patient annoyed by others criticizing their drinking/drug use Not Asked -- Provider    Patient has felt bad or guilty about drinking/drug use Not Asked -- Provider    Patient has had a drink/used drugs as an eye opener in the AM Not Asked -- Provider            Social Documentation as of 5/15/2019    None           Occupational as of 5/15/2019    None           Socioeconomic as of 5/15/2019     Marital Status Spouse Name Number of Children Years Education Education Level Preferred Language Ethnicity Race Source    Single -- 8 -- -- English /Black Black or  Provider    Financial Resource Strain Food Insecurity: Worry Food Insecurity: Inability Transportation Needs: Medical Transportation Needs: Non-medical    -- -- -- -- --            Pertinent History     Question Response Comments    Lives with family lives with 5 of her 8 children    Place in Birth Order -- --    Lives in -- apartment; was recently homeless    Number of Siblings -- --    Raised by other grew up in [place]    Legal Involvement none --    Childhood Trauma -- --    Criminal History of none --    Financial Status disabled "pain and I can't walk"    Highest Level of Education unfinished highschool --    Does patient have access to a firearm? No --     Service No --    Primary Leisure Activity -- "nothing"    Spirituality non-practicing --        Past Medical History:   Diagnosis Date    Arthritis     DVT (deep venous thrombosis)     Hypertension      Past Surgical History:   Procedure Laterality Date     SECTION, LOW TRANSVERSE  2010    TUBAL LIGATION           Family History     Problem Relation (Age of " "Onset)    Hypertension Mother        Tobacco Use    Smoking status: Former Smoker     Years: 8.00     Types: Cigarettes    Smokeless tobacco: Never Used   Substance and Sexual Activity    Alcohol use: Never     Alcohol/week: 0.6 oz     Types: 1 Glasses of wine per week     Frequency: Never    Drug use: Yes     Types: Marijuana     Comment: at first denies but then says that she last smoked a couple of days ago    Sexual activity: Not Currently     Review of patient's allergies indicates:  No Known Allergies    No current facility-administered medications on file prior to encounter.      Current Outpatient Medications on File Prior to Encounter   Medication Sig    losartan-hydrochlorothiazide 100-25 mg (HYZAAR) 100-25 mg per tablet Take 1 tablet by mouth once daily.    apixaban (ELIQUIS) 2.5 mg Tab Take 2.5 mg by mouth 2 (two) times daily.     Psychotherapeutics (From admission, onward)    None        Review of Systems   Constitutional: Positive for activity change and fatigue.   Respiratory: Negative for shortness of breath.    Cardiovascular: Negative for chest pain.   Musculoskeletal: Positive for back pain and myalgias.   Psychiatric/Behavioral: Positive for decreased concentration, dysphoric mood, sleep disturbance and suicidal ideas. The patient is nervous/anxious.      Strengths and Liabilities: Liability: Patient has poor health., Liability: Patient has poor judgment, Liability: Patient lacks coping skills.    Objective:     Vital Signs (Most Recent):  Temp: 98.4 °F (36.9 °C) (05/15/19 1241)  Pulse: 63 (05/15/19 1242)  Resp: (!) 21 (05/15/19 1220)  BP: (!) 168/88 (05/15/19 1203)  SpO2: 100 % (05/15/19 1230) Vital Signs (24h Range):  Temp:  [98.4 °F (36.9 °C)-98.9 °F (37.2 °C)] 98.4 °F (36.9 °C)  Pulse:  [] 63  Resp:  [18-21] 21  SpO2:  [99 %-100 %] 100 %  BP: (168-172)/(88-99) 168/88     Height: 6' 4" (193 cm)  Weight: (!) 181.4 kg (400 lb)  Body mass index is 48.69 kg/m².      Intake/Output " Summary (Last 24 hours) at 5/15/2019 1402  Last data filed at 5/15/2019 1217  Gross per 24 hour   Intake 1000 ml   Output --   Net 1000 ml       Physical Exam   Psychiatric:   EXAMINATION    CONSTITUTIONAL  General Appearance:  Hospital attire    MUSCULOSKELETAL  Muscle Strength and Tone:  Normal  Abnormal Involuntary Movements:  None noted  Gait and Station:  Normal gait    PSYCHIATRIC MENTAL STATUS EXAM   Level of Consciousness:  Awake and alert  Orientation:  Name, place, date, situation  Grooming:  Fair  Psychomotor Behavior:  Slowed  Speech:  Very soft and low volume  Language:  No abnormalities  Mood:  Depressed  Affect:  Flat and tearful  Thought Process:  Linear  Associations:  Intact  Thought Content:  Passive suicidal thoughts with no plan.  Vague hallucinations of shadows worsened with depression and suicidal thoughts.  Memory:  Intact to recent and remote  Attention:  Somewhat diminished  Fund of Knowledge:  Intact for conversation  Insight:  Fair into depression  Judgment:  Limited into coping skills          Significant Labs:   Last 24 Hours:   Recent Lab Results       05/15/19  1226   05/15/19  1105        Benzodiazepines Negative       Methadone metabolites Negative       Phencyclidine Negative       Acetaminophen (Tylenol), Serum   <3.0  Comment:  Toxic Levels:  Adults (4 hr post-ingestion).........>150 ug/mL  Adults (12 hr post-ingestion)........>40 ug/mL  Peds (2 hr post-ingestion, liquid)...>225 ug/mL       Albumin   3.2     Alcohol, Medical, Serum   <10     Alkaline Phosphatase   51     ALT   <5     Amphetamine Screen, Ur Negative       Anion Gap   5     Appearance, UA Hazy       AST   10     Bacteria, UA Few       Barbiturate Screen, Ur Negative       Baso #   0.01     Basophil%   0.2     Bilirubin (UA) Negative       BILIRUBIN TOTAL   0.3  Comment:  For infants and newborns, interpretation of results should be based  on gestational age, weight and in agreement with  clinical  observations.  Premature Infant recommended reference ranges:  Up to 24 hours.............<8.0 mg/dL  Up to 48 hours............<12.0 mg/dL  3-5 days..................<15.0 mg/dL  6-29 days.................<15.0 mg/dL       BUN, Bld   9     Ca Oxalate Sasha, UA Few       Calcium   9.1     Chloride   108     CO2   27     Cocaine (Metab.) Negative       Color, UA Yellow       Creatinine   0.8     Creatinine, Random Ur 214.4  Comment:  The random urine reference ranges provided were established   for 24 hour urine collections.  No reference ranges exist for  random urine specimens.  Correlate clinically.         Differential Method   Automated     eGFR if    >60     eGFR if non    >60  Comment:  Calculation used to obtain the estimated glomerular filtration  rate (eGFR) is the CKD-EPI equation.        Eos #   0.0     Eosinophil%   0.7     Glucose   94     Glucose, UA Negative       Gran # (ANC)   2.1     Gran%   48.6     Hematocrit   33.2     Hemoglobin   10.4     Ketones, UA Negative       Leukocytes, UA 2+       Lymph #   1.7     Lymph%   40.4     MCH   27.4     MCHC   31.3     MCV   88     Microscopic Comment SEE COMMENT  Comment:  Other formed elements not mentioned in the report are not   present in the microscopic examination.          Mono #   0.4     Mono%   10.1     MPV   9.9     NITRITE UA Negative       Occult Blood UA Negative       Opiate Scrn, Ur Negative       pH, UA 6.0       Platelets   259     Potassium   4.1     PROTEIN TOTAL   6.9     Protein, UA Negative  Comment:  Recommend a 24 hour urine protein or a urine   protein/creatinine ratio if globulin induced proteinuria is  clinically suspected.         RBC   3.79     RDW   15.8     Sodium   140     Specific Gravity, UA 1.025       Specimen UA Urine, Clean Catch       Squam Epithel, UA 10       Marijuana (THC) Metabolite Presumptive Positive       Toxicology Information SEE COMMENT  Comment:  This screen  includes the following classes of drugs at the   listed cut-off:  Benzodiazepines                  200 ng/ml  Methadone                        300 ng/ml  Cocaine metabolite               300 ng/ml  Opiates                          300 ng/ml  Barbiturates                     200 ng/ml  Amphetamines                    1000 ng/ml  Marijuana metabs (THC)            50 ng/ml  Phencyclidine (PCP)               25 ng/ml  High concentrations of Diphenhydramine may cross-react with  Phencyclidine PCP screening immunoassay giving a false   positive result.  High concentrations of Methylenedioxymethamphetamine (MDMA aka  Ectasy) and other structurally similar compounds may cross-   react with the Amphetamine/Methamphetamine screening   immunoassay giving a false positive result.  A metabolite of the anti-HIV drug Sustiva () may cause  false positive results in the Marijuana metabolite (THC)   screening assay.  Note: This exception list includes only more common   interferants in toxicology screen testing.  Because of many   cross-reactantspositive results on toxicology drug screens   should be confirmed whenever results do not correlate with   clinical presentation.  This report is intended for use in clinical monitoring and  management of patients. It is not intended for use in   employment related drug testing.  Because of any cross-reactants, positive results on toxicology  drug screens should be confirmed whenever results do not  correlate with clinical presentation.  Presumptive positive results are unconfirmed and may be used   only for medical purposes.  Assay Intended Use: This asasy provides only a preliminary analytical  test result. A more specific alternate chemical method must be used  to obtain a confirmed analytical result. Gas chromatography/mass  spectrometry (GS/MS)is the preferred confirmatory method. Clinical  consideration and professional judgement should be applied to any   drug of abuse test  result, particularly when preliminary results  are used.         TSH   1.224     UROBILINOGEN UA Negative       WBC, UA 10       WBC   4.26         All pertinent labs within the past 24 hours have been reviewed.    Significant Imaging: I have reviewed all pertinent imaging results/findings within the past 24 hours.

## 2019-05-15 NOTE — ED NOTES
Patient accepted by [facility] at [facility] ([address], [place], [place]) for the service of Yazmin Naranjo NP.   Report to be called to [phone].

## 2019-05-15 NOTE — ED NOTES
Admit packet faxed to [facility], [facility], [facility], and [facility].

## 2019-05-15 NOTE — ED PROVIDER NOTES
Encounter Date: 5/15/2019       History     Chief Complaint   Patient presents with    Chest Pain     cp and sob that started last night     39 y.o. female Past Medical History:  No date: Arthritis  No date: Hypertension on anti htn meds   Prior dvt completed 6 month course of eliquis last year and per pt report felt to not need more  Notes that since 11pm last night she had 2 episodes of palpitations, sensation of heart skipping beats or beating harder/faster than usual coupled with chest pressure. Overall symptoms lasted 1-2 minutes and have completely resolved. She denies sharp cp/pleuritic pain or shortness of breath.         Review of patient's allergies indicates:  No Known Allergies  Past Medical History:   Diagnosis Date    Arthritis     Hypertension      Past Surgical History:   Procedure Laterality Date     SECTION, LOW TRANSVERSE  2010    TUBAL LIGATION           Family History   Problem Relation Age of Onset    Hypertension Mother      Social History     Tobacco Use    Smoking status: Former Smoker     Years: 8.00     Types: Cigarettes    Smokeless tobacco: Never Used   Substance Use Topics    Alcohol use: Yes     Alcohol/week: 0.6 oz     Types: 1 Glasses of wine per week     Comment: occassion    Drug use: No     Types: Marijuana     Comment: last smoked a couple of days ago     Review of Systems   Constitutional: Negative for fever.   HENT: Negative for sore throat.    Respiratory: Negative for shortness of breath.    Cardiovascular: Positive for palpitations. Negative for chest pain.   Gastrointestinal: Negative for nausea.   Genitourinary: Negative for dysuria.   Musculoskeletal: Negative for back pain.   Skin: Negative for rash.   Neurological: Negative for weakness.   Hematological: Does not bruise/bleed easily.   All other systems reviewed and are negative.      Physical Exam     Initial Vitals [05/15/19 1035]   BP Pulse Resp Temp SpO2   (!) 172/99 104 18 98.9 °F (37.2  °C) 99 %      MAP       --         Physical Exam    Nursing note and vitals reviewed.  Constitutional: She appears well-developed and well-nourished.   HENT:   Head: Normocephalic and atraumatic.   Eyes: Conjunctivae and EOM are normal. Pupils are equal, round, and reactive to light.   Neck: Normal range of motion.   Cardiovascular: Normal rate and regular rhythm.   Pulmonary/Chest: Breath sounds normal. No respiratory distress. She has no wheezes. She has no rales.   Abdominal: She exhibits no distension.   Musculoskeletal: Normal range of motion.   Neurological: She is alert. No cranial nerve deficit. GCS score is 15. GCS eye subscore is 4. GCS verbal subscore is 5. GCS motor subscore is 6.   Skin: Skin is warm and dry.   Psychiatric: She has a normal mood and affect. Thought content normal.     no calf ttp/swelling      ED Course   Procedures  Labs Reviewed - No data to display  EKG Readings: (Independently Interpreted)   Hr 73, sinus, nl axis/intervals, no luis a/twi, non acute, no stemi.        Imaging Results    None          Medical Decision Making:   Initial Assessment:   Pt with palpitations. Will do screening labs, xray, pt does have hx prior DVT. Symptoms not c/w PE and no evidence of dvt on exam today.                  called back to room by RN. Patient now tearful. States she has been having SI and afraid she will act on it. Have pec'd pt.        UA is contaminated. Pt does not have a UTI    Labs Reviewed   CBC W/ AUTO DIFFERENTIAL - Abnormal; Notable for the following components:       Result Value    RBC 3.79 (*)     Hemoglobin 10.4 (*)     Hematocrit 33.2 (*)     Mean Corpuscular Hemoglobin Conc 31.3 (*)     RDW 15.8 (*)     All other components within normal limits   COMPREHENSIVE METABOLIC PANEL - Abnormal; Notable for the following components:    Albumin 3.2 (*)     Alkaline Phosphatase 51 (*)     ALT <5 (*)     Anion Gap 5 (*)     All other components within normal limits   ACETAMINOPHEN LEVEL -  Abnormal; Notable for the following components:    Acetaminophen (Tylenol), Serum <3.0 (*)     All other components within normal limits   URINALYSIS, REFLEX TO URINE CULTURE - Abnormal; Notable for the following components:    Appearance, UA Hazy (*)     Leukocytes, UA 2+ (*)     All other components within normal limits    Narrative:     Preferred Collection Type->Urine, Clean Catch   URINALYSIS MICROSCOPIC - Abnormal; Notable for the following components:    WBC, UA 10 (*)     Bacteria Few (*)     All other components within normal limits    Narrative:     Preferred Collection Type->Urine, Clean Catch   ALCOHOL,MEDICAL (ETHANOL)   DRUG SCREEN PANEL, URINE EMERGENCY    Narrative:     Preferred Collection Type->Urine, Clean Catch   TSH   POCT URINE PREGNANCY       X-Ray Chest AP Portable   Final Result      As above.         Electronically signed by: Ryley Ruiz MD   Date:    05/15/2019   Time:    11:22          Pt is medically stable for psychiatric evaluation at this time.    Clinical Impression:       ICD-10-CM ICD-9-CM   1. Suicidal behavior R46.89 300.9   2. Palpitations R00.2 785.1                                Angelica Hammonds MD  05/15/19 [id number]

## 2019-05-15 NOTE — HPI
Patient Maryam Oneal presents to the hospital with chest pain and then states that she is depressed and having suicidal thoughts.  Medical history includes hypertension, morbid obesity, chronic hip and leg pain.  She was cleared medically and consult was placed for psychiatric evaluation.  Throughout the interview, she was tearful and soft spoken.  She tells me that she has had a lot of stress recently.  She has 8 children of which 5 live with her.  She was homeless recently but has been able to get settled into an apartment.  She has financial stressors.  She has chronic hip and leg pain.  She is very sad and says that she started having suicidal thoughts yesterday but no plan.  No access to guns or weapons.  No prior history of suicidal attempts or ideations.  Admits to being depressed with loss of interest in things that she would normally like to do.  No energy or motivation.  Not sleeping comfortably at night.  Able to maintain an appetite.  She feels as if she is sitting still in the world was moving around her.  Admits to being an anxious person but not a worry wart.  Her worries do not interfere with her daily life.  No history of shayan.  Odd mention of psychosis in that she sometimes sees shadows that worry her.  This has been more so lately since she has been more depressed.  She has already called her mother to come in from out of town and care for the children who are in school today.  Patient has never been on any psychiatric medications for depression or anxiety.  She feels that she needs to get some help.  She says that she has spoken to her primary doctor about getting some psychiatric help and mentions something about an appointment later this month but is not able to tell me with whom aware.  Denies any tobacco or alcohol use.  At 1st she denies any drug use but then later admits that she smokes marijuana regularly with her last use a couple of days ago.

## 2019-05-15 NOTE — ASSESSMENT & PLAN NOTE
Patient with significant signs and symptoms of a recurrent depression.  Agree with PEC and one-to-one sitter.  Notify corner of commitment process.  Seek acute inpatient psychiatric facility now that she is medically cleared.  Would do best with SSRI treatment.  Utilize olanzapine 5 mg p.o./IM every 8 hr p.r.n. acute psychotic or non redirectable agitation.  May benefit from a 1 time dose of lorazepam 1 mg now to help calm her down and relax her from the stress of hospitalization.  It is likely that her marijuana use is contributing to her depression on top of her psychosocial stressors.

## 2019-05-15 NOTE — ED TRIAGE NOTES
"Pt states "started having CP around 11pm last night while laying down, felt like pressure, skipping beats, beating fast", endorses lightheadedness, denies SOB.      "

## 2019-05-15 NOTE — CONSULTS
[facility]  Psychiatry  Consult Note    Patient Name: Maryam Oneal  MRN: [id number]   Code Status: Prior  Admission Date: 5/15/2019  Hospital Length of Stay: 0 days  Attending Physician: Angelica Hammonds, *  Primary Care Provider: Phuong Reynoso MD    Current Legal Status: [facility]    Patient information was obtained from patient, Chart review, nursing, and ER records.   Inpatient consult to Psychiatry  Consult performed by: Jarad Bentley MD  Consult ordered by: Angelica Hammonds MD        Subjective:     Principal Problem:<principal problem not specified>    Chief Complaint:  Depression and suicidal thoughts     HPI: Patient Maryam Oneal presents to the hospital with chest pain and then states that she is depressed and having suicidal thoughts.  Medical history includes hypertension, morbid obesity, chronic hip and leg pain.  She was cleared medically and consult was placed for psychiatric evaluation.  Throughout the interview, she was tearful and soft spoken.  She tells me that she has had a lot of stress recently.  She has 8 children of which 5 live with her.  She was homeless recently but has been able to get settled into an apartment.  She has financial stressors.  She has chronic hip and leg pain.  She is very sad and says that she started having suicidal thoughts yesterday but no plan.  No access to guns or weapons.  No prior history of suicidal attempts or ideations.  Admits to being depressed with loss of interest in things that she would normally like to do.  No energy or motivation.  Not sleeping comfortably at night.  Able to maintain an appetite.  She feels as if she is sitting still in the world was moving around her.  Admits to being an anxious person but not a worry wart.  Her worries do not interfere with her daily life.  No history of shayan.  Odd mention of psychosis in that she sometimes sees shadows that worry her.  This has been more so lately since she has  been more depressed.  She has already called her mother to come in from out of town and care for the children who are in school today.  Patient has never been on any psychiatric medications for depression or anxiety.  She feels that she needs to get some help.  She says that she has spoken to her primary doctor about getting some psychiatric help and mentions something about an appointment later this month but is not able to tell me with whom aware.  Denies any tobacco or alcohol use.  At 1st she denies any drug use but then later admits that she smokes marijuana regularly with her last use a couple of days ago.    Hospital Course: No notes on file         Patient History           Medical as of 5/15/2019     Past Medical History     Diagnosis Date Comments Source    Arthritis -- -- Provider    DVT (deep venous thrombosis) -- -- Provider    Hypertension -- -- Provider          Pertinent Negatives     Diagnosis Date Noted Comments Source    Asthma 2012 -- Provider    CHF (congestive heart failure) 2015 -- Provider    Coronary artery disease 2015 -- Provider    Diabetes mellitus 2012 -- Provider    GERD (gastroesophageal reflux disease) 2015 -- Provider    History of psychiatric hospitalization 05/15/2019 -- Provider    Hx of psychiatric care 05/15/2019 -- Provider    Migraine headache 05/15/2019 -- Provider    Psychiatric problem 05/15/2019 -- Provider    Seizures 2015 -- Provider    Stroke 2015 -- Provider    Suicide attempt 05/15/2019 -- Provider    Therapy 05/15/2019 -- Provider                  Surgical as of 5/15/2019     Past Surgical History     Procedure Laterality Date Comments Source     SECTION, LOW TRANSVERSE -- 2010 -- Provider    TUBAL LIGATION -- -- 2010 Provider                  Family as of 5/15/2019     Problem Relation Name Age of Onset Comments Source    Hypertension Mother -- -- -- Provider            Tobacco Use as of 5/15/2019     Smoking  Status Smoking Start Date Smoking Quit Date Packs/Day Years Used    Former Smoker -- -- -- 8.00    Types Comments Smokeless Tobacco Status Smokeless Tobacco Quit Date Source     Cigarettes -- Never Used -- Provider            Alcohol Use as of 5/15/2019     Alcohol Use Drinks/Week Alcohol/Week Comments Source    Never 1 Glasses of wine 0.6 oz -- Provider    Frequency Standard Drinks Binge Drinking        Never -- --              Drug Use as of 5/15/2019     Drug Use Types Frequency Comments Source    Yes  Marijuana -- at first denies but then says that she last smoked a couple of days ago Provider            Sexual Activity as of 5/15/2019     Sexually Active Birth Control Partners Comments Source    Not Currently -- -- -- Provider            Activities of Daily Living as of 5/15/2019     Activities of Daily Living Question Response Comments Source    Patient feels they ought to cut down on drinking/drug use Not Asked -- Provider    Patient annoyed by others criticizing their drinking/drug use Not Asked -- Provider    Patient has felt bad or guilty about drinking/drug use Not Asked -- Provider    Patient has had a drink/used drugs as an eye opener in the AM Not Asked -- Provider            Social Documentation as of 5/15/2019    None           Occupational as of 5/15/2019    None           Socioeconomic as of 5/15/2019     Marital Status Spouse Name Number of Children Years Education Education Level Preferred Language Ethnicity Race Source    Single -- 8 -- -- English /Black Black or  Provider    Financial Resource Strain Food Insecurity: Worry Food Insecurity: Inability Transportation Needs: Medical Transportation Needs: Non-medical    -- -- -- -- --            Pertinent History     Question Response Comments    Lives with family lives with 5 of her 8 children    Place in Birth Order -- --    Lives in -- apartment; was recently homeless    Number of Siblings -- --    Raised by other  "grew up in [place]    Legal Involvement none --    Childhood Trauma -- --    Criminal History of none --    Financial Status disabled "pain and I can't walk"    Highest Level of Education unfinished highschool --    Does patient have access to a firearm? No --     Service No --    Primary Leisure Activity -- "nothing"    Spirituality non-practicing --        Past Medical History:   Diagnosis Date    Arthritis     DVT (deep venous thrombosis)     Hypertension      Past Surgical History:   Procedure Laterality Date     SECTION, LOW TRANSVERSE  2010    TUBAL LIGATION           Family History     Problem Relation (Age of Onset)    Hypertension Mother        Tobacco Use    Smoking status: Former Smoker     Years: 8.00     Types: Cigarettes    Smokeless tobacco: Never Used   Substance and Sexual Activity    Alcohol use: Never     Alcohol/week: 0.6 oz     Types: 1 Glasses of wine per week     Frequency: Never    Drug use: Yes     Types: Marijuana     Comment: at first denies but then says that she last smoked a couple of days ago    Sexual activity: Not Currently     Review of patient's allergies indicates:  No Known Allergies    No current facility-administered medications on file prior to encounter.      Current Outpatient Medications on File Prior to Encounter   Medication Sig    losartan-hydrochlorothiazide 100-25 mg (HYZAAR) 100-25 mg per tablet Take 1 tablet by mouth once daily.    apixaban (ELIQUIS) 2.5 mg Tab Take 2.5 mg by mouth 2 (two) times daily.     Psychotherapeutics (From admission, onward)    None        Review of Systems   Constitutional: Positive for activity change and fatigue.   Respiratory: Negative for shortness of breath.    Cardiovascular: Negative for chest pain.   Musculoskeletal: Positive for back pain and myalgias.   Psychiatric/Behavioral: Positive for decreased concentration, dysphoric mood, sleep disturbance and suicidal ideas. The patient is " "nervous/anxious.      Strengths and Liabilities: Liability: Patient has poor health., Liability: Patient has poor judgment, Liability: Patient lacks coping skills.    Objective:     Vital Signs (Most Recent):  Temp: 98.4 °F (36.9 °C) (05/15/19 1241)  Pulse: 63 (05/15/19 1242)  Resp: (!) 21 (05/15/19 1220)  BP: (!) 168/88 (05/15/19 1203)  SpO2: 100 % (05/15/19 1230) Vital Signs (24h Range):  Temp:  [98.4 °F (36.9 °C)-98.9 °F (37.2 °C)] 98.4 °F (36.9 °C)  Pulse:  [] 63  Resp:  [18-21] 21  SpO2:  [99 %-100 %] 100 %  BP: (168-172)/(88-99) 168/88     Height: 6' 4" (193 cm)  Weight: (!) 181.4 kg (400 lb)  Body mass index is 48.69 kg/m².      Intake/Output Summary (Last 24 hours) at 5/15/2019 1402  Last data filed at 5/15/2019 1217  Gross per 24 hour   Intake 1000 ml   Output --   Net 1000 ml       Physical Exam   Psychiatric:   EXAMINATION    CONSTITUTIONAL  General Appearance:  Hospital attire    MUSCULOSKELETAL  Muscle Strength and Tone:  Normal  Abnormal Involuntary Movements:  None noted  Gait and Station:  Normal gait    PSYCHIATRIC MENTAL STATUS EXAM   Level of Consciousness:  Awake and alert  Orientation:  Name, place, date, situation  Grooming:  Fair  Psychomotor Behavior:  Slowed  Speech:  Very soft and low volume  Language:  No abnormalities  Mood:  Depressed  Affect:  Flat and tearful  Thought Process:  Linear  Associations:  Intact  Thought Content:  Passive suicidal thoughts with no plan.  Vague hallucinations of shadows worsened with depression and suicidal thoughts.  Memory:  Intact to recent and remote  Attention:  Somewhat diminished  Fund of Knowledge:  Intact for conversation  Insight:  Fair into depression  Judgment:  Limited into coping skills          Significant Labs:   Last 24 Hours:   Recent Lab Results       05/15/19  1226   05/15/19  1105        Benzodiazepines Negative       Methadone metabolites Negative       Phencyclidine Negative       Acetaminophen (Tylenol), Serum   " <3.0  Comment:  Toxic Levels:  Adults (4 hr post-ingestion).........>150 ug/mL  Adults (12 hr post-ingestion)........>40 ug/mL  Peds (2 hr post-ingestion, liquid)...>225 ug/mL       Albumin   3.2     Alcohol, Medical, Serum   <10     Alkaline Phosphatase   51     ALT   <5     Amphetamine Screen, Ur Negative       Anion Gap   5     Appearance, UA Hazy       AST   10     Bacteria, UA Few       Barbiturate Screen, Ur Negative       Baso #   0.01     Basophil%   0.2     Bilirubin (UA) Negative       BILIRUBIN TOTAL   0.3  Comment:  For infants and newborns, interpretation of results should be based  on gestational age, weight and in agreement with clinical  observations.  Premature Infant recommended reference ranges:  Up to 24 hours.............<8.0 mg/dL  Up to 48 hours............<12.0 mg/dL  3-5 days..................<15.0 mg/dL  6-29 days.................<15.0 mg/dL       BUN, Bld   9     Ca Oxalate Sasha, UA Few       Calcium   9.1     Chloride   108     CO2   27     Cocaine (Metab.) Negative       Color, UA Yellow       Creatinine   0.8     Creatinine, Random Ur 214.4  Comment:  The random urine reference ranges provided were established   for 24 hour urine collections.  No reference ranges exist for  random urine specimens.  Correlate clinically.         Differential Method   Automated     eGFR if    >60     eGFR if non    >60  Comment:  Calculation used to obtain the estimated glomerular filtration  rate (eGFR) is the CKD-EPI equation.        Eos #   0.0     Eosinophil%   0.7     Glucose   94     Glucose, UA Negative       Gran # (ANC)   2.1     Gran%   48.6     Hematocrit   33.2     Hemoglobin   10.4     Ketones, UA Negative       Leukocytes, UA 2+       Lymph #   1.7     Lymph%   40.4     MCH   27.4     MCHC   31.3     MCV   88     Microscopic Comment SEE COMMENT  Comment:  Other formed elements not mentioned in the report are not   present in the microscopic examination.           Mono #   0.4     Mono%   10.1     MPV   9.9     NITRITE UA Negative       Occult Blood UA Negative       Opiate Scrn, Ur Negative       pH, UA 6.0       Platelets   259     Potassium   4.1     PROTEIN TOTAL   6.9     Protein, UA Negative  Comment:  Recommend a 24 hour urine protein or a urine   protein/creatinine ratio if globulin induced proteinuria is  clinically suspected.         RBC   3.79     RDW   15.8     Sodium   140     Specific Gravity, UA 1.025       Specimen UA Urine, Clean Catch       Squam Epithel, UA 10       Marijuana (THC) Metabolite Presumptive Positive       Toxicology Information SEE COMMENT  Comment:  This screen includes the following classes of drugs at the   listed cut-off:  Benzodiazepines                  200 ng/ml  Methadone                        300 ng/ml  Cocaine metabolite               300 ng/ml  Opiates                          300 ng/ml  Barbiturates                     200 ng/ml  Amphetamines                    1000 ng/ml  Marijuana metabs (THC)            50 ng/ml  Phencyclidine (PCP)               25 ng/ml  High concentrations of Diphenhydramine may cross-react with  Phencyclidine PCP screening immunoassay giving a false   positive result.  High concentrations of Methylenedioxymethamphetamine (MDMA aka  Ectasy) and other structurally similar compounds may cross-   react with the Amphetamine/Methamphetamine screening   immunoassay giving a false positive result.  A metabolite of the anti-HIV drug Sustiva () may cause  false positive results in the Marijuana metabolite (THC)   screening assay.  Note: This exception list includes only more common   interferants in toxicology screen testing.  Because of many   cross-reactantspositive results on toxicology drug screens   should be confirmed whenever results do not correlate with   clinical presentation.  This report is intended for use in clinical monitoring and  management of patients. It is not intended for use in    employment related drug testing.  Because of any cross-reactants, positive results on toxicology  drug screens should be confirmed whenever results do not  correlate with clinical presentation.  Presumptive positive results are unconfirmed and may be used   only for medical purposes.  Assay Intended Use: This asasy provides only a preliminary analytical  test result. A more specific alternate chemical method must be used  to obtain a confirmed analytical result. Gas chromatography/mass  spectrometry (GS/MS)is the preferred confirmatory method. Clinical  consideration and professional judgement should be applied to any   drug of abuse test result, particularly when preliminary results  are used.         TSH   1.224     UROBILINOGEN UA Negative       WBC, UA 10       WBC   4.26         All pertinent labs within the past 24 hours have been reviewed.    Significant Imaging: I have reviewed all pertinent imaging results/findings within the past 24 hours.    Assessment/Plan:     Major depressive disorder, recurrent episode, severe  Patient with significant signs and symptoms of a recurrent depression.  Agree with PEC and one-to-one sitter.  Notify corner of commitment process.  Seek acute inpatient psychiatric facility now that she is medically cleared.  Would do best with SSRI treatment.  Utilize olanzapine 5 mg p.o./IM every 8 hr p.r.n. acute psychotic or non redirectable agitation.  May benefit from a 1 time dose of lorazepam 1 mg now to help calm her down and relax her from the stress of hospitalization.  It is likely that her marijuana use is contributing to her depression on top of her psychosocial stressors.         Total Time:  60 minutes      Jarad Bentley MD   Psychiatry  [facility]

## 2020-02-18 ENCOUNTER — HOSPITAL ENCOUNTER (EMERGENCY)
Facility: HOSPITAL | Age: 40
Discharge: HOME | End: 2020-02-18
Attending: EMERGENCY MEDICINE
Payer: MEDICAID

## 2020-02-18 VITALS
HEART RATE: 83 BPM | OXYGEN SATURATION: 97 % | BODY MASS INDEX: 39.68 KG/M2 | RESPIRATION RATE: 21 BRPM | HEIGHT: 72 IN | SYSTOLIC BLOOD PRESSURE: 187 MMHG | TEMPERATURE: 98 F | DIASTOLIC BLOOD PRESSURE: 82 MMHG | WEIGHT: 293 LBS

## 2020-02-18 DIAGNOSIS — R10.84 GENERALIZED ABDOMINAL PAIN: Primary | ICD-10-CM

## 2020-02-18 DIAGNOSIS — K59.00 CONSTIPATION: ICD-10-CM

## 2020-02-18 LAB
ALBUMIN SERPL BCP-MCNC: 3.3 G/DL (ref 3.5–5.2)
ALP SERPL-CCNC: 59 U/L (ref 55–135)
ALT SERPL W/O P-5'-P-CCNC: 8 U/L (ref 10–44)
ANION GAP SERPL CALC-SCNC: 7 MMOL/L (ref 8–16)
AST SERPL-CCNC: 11 U/L (ref 10–40)
B-HCG UR QL: NEGATIVE
BASOPHILS # BLD AUTO: 0.01 K/UL (ref 0–0.2)
BASOPHILS NFR BLD: 0.1 % (ref 0–1.9)
BILIRUB SERPL-MCNC: 0.3 MG/DL (ref 0.1–1)
BILIRUB UR QL STRIP: NEGATIVE
BUN SERPL-MCNC: 7 MG/DL (ref 6–20)
CALCIUM SERPL-MCNC: 8.8 MG/DL (ref 8.7–10.5)
CHLORIDE SERPL-SCNC: 106 MMOL/L (ref 95–110)
CLARITY UR: CLEAR
CO2 SERPL-SCNC: 26 MMOL/L (ref 23–29)
COLOR UR: YELLOW
CREAT SERPL-MCNC: 0.8 MG/DL (ref 0.5–1.4)
CTP QC/QA: YES
DIFFERENTIAL METHOD: ABNORMAL
EOSINOPHIL # BLD AUTO: 0.1 K/UL (ref 0–0.5)
EOSINOPHIL NFR BLD: 0.7 % (ref 0–8)
ERYTHROCYTE [DISTWIDTH] IN BLOOD BY AUTOMATED COUNT: 15.9 % (ref 11.5–14.5)
EST. GFR  (AFRICAN AMERICAN): >60 ML/MIN/1.73 M^2
EST. GFR  (NON AFRICAN AMERICAN): >60 ML/MIN/1.73 M^2
GLUCOSE SERPL-MCNC: 95 MG/DL (ref 70–110)
GLUCOSE UR QL STRIP: NEGATIVE
HCT VFR BLD AUTO: 29.8 % (ref 37–48.5)
HGB BLD-MCNC: 9.1 G/DL (ref 12–16)
HGB UR QL STRIP: ABNORMAL
IMM GRANULOCYTES # BLD AUTO: 0.01 K/UL (ref 0–0.04)
IMM GRANULOCYTES NFR BLD AUTO: 0.1 % (ref 0–0.5)
KETONES UR QL STRIP: NEGATIVE
LEUKOCYTE ESTERASE UR QL STRIP: ABNORMAL
LIPASE SERPL-CCNC: 13 U/L (ref 4–60)
LYMPHOCYTES # BLD AUTO: 1.8 K/UL (ref 1–4.8)
LYMPHOCYTES NFR BLD: 26.3 % (ref 18–48)
MCH RBC QN AUTO: 25.6 PG (ref 27–31)
MCHC RBC AUTO-ENTMCNC: 30.5 G/DL (ref 32–36)
MCV RBC AUTO: 84 FL (ref 82–98)
MICROSCOPIC COMMENT: NORMAL
MONOCYTES # BLD AUTO: 0.5 K/UL (ref 0.3–1)
MONOCYTES NFR BLD: 7.9 % (ref 4–15)
NEUTROPHILS # BLD AUTO: 4.3 K/UL (ref 1.8–7.7)
NEUTROPHILS NFR BLD: 64.9 % (ref 38–73)
NITRITE UR QL STRIP: NEGATIVE
NRBC BLD-RTO: 0 /100 WBC
PH UR STRIP: 5 [PH] (ref 5–8)
PLATELET # BLD AUTO: 251 K/UL (ref 150–350)
PMV BLD AUTO: 9.9 FL (ref 9.2–12.9)
POTASSIUM SERPL-SCNC: 3.6 MMOL/L (ref 3.5–5.1)
PROT SERPL-MCNC: 7.1 G/DL (ref 6–8.4)
PROT UR QL STRIP: NEGATIVE
RBC # BLD AUTO: 3.56 M/UL (ref 4–5.4)
RBC #/AREA URNS HPF: 2 /HPF (ref 0–4)
SODIUM SERPL-SCNC: 139 MMOL/L (ref 136–145)
SP GR UR STRIP: 1.01 (ref 1–1.03)
URN SPEC COLLECT METH UR: ABNORMAL
UROBILINOGEN UR STRIP-ACNC: NEGATIVE EU/DL
WBC # BLD AUTO: 6.68 K/UL (ref 3.9–12.7)
WBC #/AREA URNS HPF: 2 /HPF (ref 0–5)

## 2020-02-18 PROCEDURE — 63600175 PHARM REV CODE 636 W HCPCS: Performed by: EMERGENCY MEDICINE

## 2020-02-18 PROCEDURE — 99284 EMERGENCY DEPT VISIT MOD MDM: CPT | Mod: 25

## 2020-02-18 PROCEDURE — 80053 COMPREHEN METABOLIC PANEL: CPT

## 2020-02-18 PROCEDURE — 83690 ASSAY OF LIPASE: CPT

## 2020-02-18 PROCEDURE — 81025 URINE PREGNANCY TEST: CPT | Performed by: EMERGENCY MEDICINE

## 2020-02-18 PROCEDURE — 85025 COMPLETE CBC W/AUTO DIFF WBC: CPT

## 2020-02-18 PROCEDURE — 81000 URINALYSIS NONAUTO W/SCOPE: CPT

## 2020-02-18 PROCEDURE — 96374 THER/PROPH/DIAG INJ IV PUSH: CPT

## 2020-02-18 RX ORDER — ACETAMINOPHEN 325 MG/1
650 TABLET ORAL EVERY 6 HOURS PRN
Qty: 13 TABLET | Refills: 0 | Status: SHIPPED | OUTPATIENT
Start: 2020-02-18 | End: 2020-03-05

## 2020-02-18 RX ORDER — KETOROLAC TROMETHAMINE 30 MG/ML
30 INJECTION, SOLUTION INTRAMUSCULAR; INTRAVENOUS
Status: COMPLETED | OUTPATIENT
Start: 2020-02-18 | End: 2020-02-18

## 2020-02-18 RX ORDER — DOCUSATE SODIUM 100 MG/1
100 CAPSULE, LIQUID FILLED ORAL 2 TIMES DAILY
Qty: 60 CAPSULE | Refills: 0 | Status: SHIPPED | OUTPATIENT
Start: 2020-02-18 | End: 2020-03-05

## 2020-02-18 RX ADMIN — KETOROLAC TROMETHAMINE 30 MG: 30 INJECTION, SOLUTION INTRAMUSCULAR at 02:02

## 2020-02-18 NOTE — ED NOTES
39 year old female to ED for Abdominal pain and nausea x 1 month with worsening in the last few days. She states pain is worse on left. ED workup initiated.

## 2020-02-18 NOTE — ED PROVIDER NOTES
"Encounter Date: 2020    SCRIBE #1 NOTE: I, Eran Torrez, am scribing for, and in the presence of,  Memo Triplett MD. I have scribed the following portions of the note - Other sections scribed: HPI, ROS, PE, MDM.       History     Chief Complaint   Patient presents with    Abdominal Pain     Pt c/o generalized abdominal pain x 1 month. Worse today. Denies fever, vomiting diarrhea, dysuria and vaginal discharge.      This 39 y.o F (LMP 20) with a hx of Arthritis, DVT and HTN presents to the ED c/o generalized abdominal pain and SOB x1 month. Her abdominal pain is worse with eating. The pt states "when it hurts it makes a noise. Like my stomach is growling." Additionally, she c/o constipation x4 days. The pt states that she has been in [place] with family since November and recently returned to [place]. She does report increased stress and decreased activity lately. She does consume about 3 bottles of water/ day. She denies fever, dysuria, urinary frequency, decreased appetite, chest pain, back pain, rash and any other associated symptoms. No prior tx. She does smoke cigarettes. She does not consume EtOH or use illicit drugs.     The history is provided by the patient.     Review of patient's allergies indicates:  No Known Allergies  Past Medical History:   Diagnosis Date    Arthritis     DVT (deep venous thrombosis)     Hypertension      Past Surgical History:   Procedure Laterality Date     SECTION, LOW TRANSVERSE  2010    TUBAL LIGATION           Family History   Problem Relation Age of Onset    Hypertension Mother      Social History     Tobacco Use    Smoking status: Former Smoker     Years: 8.00     Types: Cigarettes, Cigars    Smokeless tobacco: Never Used   Substance Use Topics    Alcohol use: Never     Alcohol/week: 1.0 standard drinks     Types: 1 Glasses of wine per week     Frequency: Never    Drug use: Yes     Types: Marijuana     Comment: at first denies but " then says that she last smoked a couple of days ago     Review of Systems   Constitutional: Positive for activity change. Negative for appetite change.   Respiratory: Positive for shortness of breath.    Cardiovascular: Negative for chest pain.   Gastrointestinal: Positive for abdominal pain and constipation.   Genitourinary: Negative for difficulty urinating, dysuria and frequency.   Musculoskeletal: Negative for back pain.   Skin: Negative for rash.   All other systems reviewed and are negative.      Physical Exam     Initial Vitals [02/18/20 1035]   BP Pulse Resp Temp SpO2   (!) 181/111 98 18 98.4 °F (36.9 °C) 99 %      MAP       --         Physical Exam    Nursing note and vitals reviewed.  Constitutional: She appears well-developed and well-nourished.  Non-toxic appearance. No distress.   Obese.    HENT:   Head: Normocephalic and atraumatic.   Mouth/Throat: Oropharynx is clear and moist and mucous membranes are normal.   Eyes: Conjunctivae and EOM are normal. Pupils are equal, round, and reactive to light. Right conjunctiva is not injected. Left conjunctiva is not injected. No scleral icterus.   Neck: Normal range of motion and full passive range of motion without pain. Neck supple.   Cardiovascular: Normal rate, regular rhythm, S1 normal, S2 normal, normal heart sounds and normal pulses. Exam reveals no gallop and no friction rub.    No murmur heard.  Pulses:       Radial pulses are 2+ on the right side, and 2+ on the left side.   Pulmonary/Chest: Effort normal and breath sounds normal. No respiratory distress.   Abdominal: Soft. She exhibits no distension. There is no tenderness. There is no CVA tenderness.   No abdominal tenderness on distraction.    Musculoskeletal: Normal range of motion. She exhibits no edema.   Good active ROM of all extremities. No lower extremity edema or cyanosis.    Neurological: No cranial nerve deficit. Gait normal.   A&Ox4, normal speech.   Skin: Skin is warm. No ecchymosis and no  rash noted.   Psychiatric: Thought content normal.   Seems stressed         ED Course   Procedures  Labs Reviewed   CBC W/ AUTO DIFFERENTIAL - Abnormal; Notable for the following components:       Result Value    RBC 3.56 (*)     Hemoglobin 9.1 (*)     Hematocrit 29.8 (*)     Mean Corpuscular Hemoglobin 25.6 (*)     Mean Corpuscular Hemoglobin Conc 30.5 (*)     RDW 15.9 (*)     All other components within normal limits   COMPREHENSIVE METABOLIC PANEL - Abnormal; Notable for the following components:    Albumin 3.3 (*)     ALT 8 (*)     Anion Gap 7 (*)     All other components within normal limits   URINALYSIS, REFLEX TO URINE CULTURE - Abnormal; Notable for the following components:    Occult Blood UA 1+ (*)     Leukocytes, UA Trace (*)     All other components within normal limits    Narrative:     Preferred Collection Type->Urine, Clean Catch   LIPASE   URINALYSIS MICROSCOPIC    Narrative:     Preferred Collection Type->Urine, Clean Catch   POCT URINE PREGNANCY          Imaging Results          X-Ray Abdomen Flat And Erect (Final result)  Result time 02/18/20 14:02:03    Final result by Jose Alfredo Thorne MD (02/18/20 14:02:03)                 Impression:      No evidence of bowel obstruction or perforation.      Electronically signed by: Jose Alfredo Thorne MD  Date:    02/18/2020  Time:    14:02             Narrative:    EXAMINATION:  XR ABDOMEN FLAT AND ERECT    CLINICAL HISTORY:  Constipation, unspecified    TECHNIQUE:  Flat and erect AP views of the abdomen were preformed.    COMPARISON:  None    FINDINGS:  The bowel gas pattern is non-obstructive.  No findings to suggest free air.No abnormal soft tissue masses noted.    No airspace consolidation at the lung bases.    No acute bony abnormality.Degenerative changes both hips with superior lateral acetabular osteophytic spurs.                                 Medical Decision Making:   Initial Assessment:   39 year old patient presenting 2/2 abdominal pain around  the umbilicus.  No major tenderness on my exam.  I believe this is likely stress induced.  Patient is very stressed out from recent move in where she staying.  Repeat abdominal exam is benign.  Labs and imaging reassuring.  Starting patient on Tylenol and Colace.  I instructed patient on dietary changes.    Also considered but less likely:     AAA: location inconsistent, no pulsatile mass  Cholecystitis: location inconsistent, no relation with meals, negative bhat's  SBO: normal BM and flatus. No vomiting  Appendicitis: location inconsistent, no fever, no rebound/guarding  Mesenteric ischemia: HPI inconsistent, does not coincide with meals, other dx more likely  Kidney stone: no radiation to back or cva tenderness, no dysuria, no hematuria  Pyelonephritis: no cva tenderness, no dysuria, no fever  Pancreatitis: no history of alcohol abuse, unlikely gallstone obstructing, location inconsistent  Diverticulitis: age and location not most common, no history of diverticulitis, no fever, no wbc  TOA: no systemic symptoms, location inconsistent  Ectopic: negative pregnancy test  Torsion:  hpi not consistent  PID: no history of STDs, no vaginal discharge reported    Patient discharged home with Tylenol and colace. Return precautions given, patient understands and agrees with plan. All questions answered.  Instructed to follow up with PCP. I discussed with the patient the diagnosis, treatment plan, indications for return to the emergency department, and for expected follow-up. The patient verbalized an understanding. The patient is asked if there are any questions or concerns. We discuss the case, until all issues are addressed to the patients satisfaction. Patient understands and is agreeable to the plan.   Memo Triplett      Clinical Tests:   Lab Tests: Ordered and Reviewed  Radiological Study: Ordered and Reviewed            Scribe Attestation:   Scribe #1: I performed the above scribed service and the documentation  accurately describes the services I performed. I attest to the accuracy of the note.                          Clinical Impression:       ICD-10-CM ICD-9-CM   1. Generalized abdominal pain R10.84 789.07   2. Constipation K59.00 564.00              I, Memo Triplett, personally performed the services described in this documentation. All medical record entries made by the scribe were at my direction and in my presence. I have reviewed the chart and agree that the record reflects my personal performance and is accurate and complete.                 Memo Triplett MD  02/18/20 8593

## 2020-02-20 ENCOUNTER — HOSPITAL ENCOUNTER (INPATIENT)
Facility: HOSPITAL | Age: 40
LOS: 11 days | Discharge: HOME | DRG: 330 | End: 2020-03-02
Attending: EMERGENCY MEDICINE | Admitting: SURGERY
Payer: MEDICAID

## 2020-02-20 DIAGNOSIS — K56.609 LARGE BOWEL OBSTRUCTION: Primary | ICD-10-CM

## 2020-02-20 DIAGNOSIS — R10.9 ABDOMINAL PAIN: ICD-10-CM

## 2020-02-20 DIAGNOSIS — I74.9 EMBOLISM: ICD-10-CM

## 2020-02-20 DIAGNOSIS — M79.606 LEG PAIN: ICD-10-CM

## 2020-02-20 DIAGNOSIS — C18.6 MALIGNANT NEOPLASM OF DESCENDING COLON: ICD-10-CM

## 2020-02-20 DIAGNOSIS — K59.00 CONSTIPATION, UNSPECIFIED CONSTIPATION TYPE: ICD-10-CM

## 2020-02-20 DIAGNOSIS — R10.9 ABDOMINAL PAIN, UNSPECIFIED ABDOMINAL LOCATION: ICD-10-CM

## 2020-02-20 DIAGNOSIS — K59.00 CONSTIPATION: ICD-10-CM

## 2020-02-20 LAB
ALBUMIN SERPL BCP-MCNC: 3.5 G/DL (ref 3.5–5.2)
ALP SERPL-CCNC: 64 U/L (ref 55–135)
ALT SERPL W/O P-5'-P-CCNC: 7 U/L (ref 10–44)
AMPHET+METHAMPHET UR QL: NEGATIVE
ANION GAP SERPL CALC-SCNC: 10 MMOL/L (ref 8–16)
AST SERPL-CCNC: 12 U/L (ref 10–40)
B-HCG UR QL: NEGATIVE
BACTERIA #/AREA URNS HPF: ABNORMAL /HPF
BARBITURATES UR QL SCN>200 NG/ML: NEGATIVE
BASOPHILS # BLD AUTO: 0.01 K/UL (ref 0–0.2)
BASOPHILS NFR BLD: 0.2 % (ref 0–1.9)
BENZODIAZ UR QL SCN>200 NG/ML: NEGATIVE
BILIRUB SERPL-MCNC: 0.4 MG/DL (ref 0.1–1)
BILIRUB UR QL STRIP: NEGATIVE
BUN SERPL-MCNC: 8 MG/DL (ref 6–20)
BZE UR QL SCN: NEGATIVE
CALCIUM SERPL-MCNC: 9.3 MG/DL (ref 8.7–10.5)
CANNABINOIDS UR QL SCN: NEGATIVE
CHLORIDE SERPL-SCNC: 105 MMOL/L (ref 95–110)
CLARITY UR: CLEAR
CO2 SERPL-SCNC: 23 MMOL/L (ref 23–29)
COLOR UR: YELLOW
CREAT SERPL-MCNC: 0.8 MG/DL (ref 0.5–1.4)
CREAT UR-MCNC: 205.8 MG/DL (ref 15–325)
CTP QC/QA: YES
D DIMER PPP IA.FEU-MCNC: 3.1 MG/L FEU
DIFFERENTIAL METHOD: ABNORMAL
EOSINOPHIL # BLD AUTO: 0 K/UL (ref 0–0.5)
EOSINOPHIL NFR BLD: 0.5 % (ref 0–8)
ERYTHROCYTE [DISTWIDTH] IN BLOOD BY AUTOMATED COUNT: 16 % (ref 11.5–14.5)
EST. GFR  (AFRICAN AMERICAN): >60 ML/MIN/1.73 M^2
EST. GFR  (NON AFRICAN AMERICAN): >60 ML/MIN/1.73 M^2
GLUCOSE SERPL-MCNC: 97 MG/DL (ref 70–110)
GLUCOSE UR QL STRIP: NEGATIVE
HCT VFR BLD AUTO: 32.6 % (ref 37–48.5)
HGB BLD-MCNC: 9.9 G/DL (ref 12–16)
HGB UR QL STRIP: NEGATIVE
IMM GRANULOCYTES # BLD AUTO: 0.02 K/UL (ref 0–0.04)
IMM GRANULOCYTES NFR BLD AUTO: 0.3 % (ref 0–0.5)
KETONES UR QL STRIP: ABNORMAL
LEUKOCYTE ESTERASE UR QL STRIP: ABNORMAL
LIPASE SERPL-CCNC: 15 U/L (ref 4–60)
LYMPHOCYTES # BLD AUTO: 1.2 K/UL (ref 1–4.8)
LYMPHOCYTES NFR BLD: 19.5 % (ref 18–48)
MCH RBC QN AUTO: 25.8 PG (ref 27–31)
MCHC RBC AUTO-ENTMCNC: 30.4 G/DL (ref 32–36)
MCV RBC AUTO: 85 FL (ref 82–98)
METHADONE UR QL SCN>300 NG/ML: NEGATIVE
MICROSCOPIC COMMENT: ABNORMAL
MONOCYTES # BLD AUTO: 0.5 K/UL (ref 0.3–1)
MONOCYTES NFR BLD: 7.8 % (ref 4–15)
NEUTROPHILS # BLD AUTO: 4.6 K/UL (ref 1.8–7.7)
NEUTROPHILS NFR BLD: 71.7 % (ref 38–73)
NITRITE UR QL STRIP: NEGATIVE
NRBC BLD-RTO: 0 /100 WBC
OPIATES UR QL SCN: NORMAL
PCP UR QL SCN>25 NG/ML: NEGATIVE
PH UR STRIP: 5 [PH] (ref 5–8)
PLATELET # BLD AUTO: 257 K/UL (ref 150–350)
PMV BLD AUTO: 9.8 FL (ref 9.2–12.9)
POTASSIUM SERPL-SCNC: 3.9 MMOL/L (ref 3.5–5.1)
PROT SERPL-MCNC: 7.9 G/DL (ref 6–8.4)
PROT UR QL STRIP: NEGATIVE
RBC # BLD AUTO: 3.83 M/UL (ref 4–5.4)
RBC #/AREA URNS HPF: 2 /HPF (ref 0–4)
SODIUM SERPL-SCNC: 138 MMOL/L (ref 136–145)
SP GR UR STRIP: >1.06 (ref 1–1.03)
SQUAMOUS #/AREA URNS HPF: 16 /HPF
TOXICOLOGY INFORMATION: NORMAL
TROPONIN I SERPL DL<=0.01 NG/ML-MCNC: <0.006 NG/ML (ref 0–0.03)
URN SPEC COLLECT METH UR: ABNORMAL
UROBILINOGEN UR STRIP-ACNC: NEGATIVE EU/DL
WBC # BLD AUTO: 6.37 K/UL (ref 3.9–12.7)
WBC #/AREA URNS HPF: 12 /HPF (ref 0–5)

## 2020-02-20 PROCEDURE — 93005 ELECTROCARDIOGRAM TRACING: CPT

## 2020-02-20 PROCEDURE — 25000003 PHARM REV CODE 250: Performed by: EMERGENCY MEDICINE

## 2020-02-20 PROCEDURE — 63600175 PHARM REV CODE 636 W HCPCS: Performed by: EMERGENCY MEDICINE

## 2020-02-20 PROCEDURE — 96374 THER/PROPH/DIAG INJ IV PUSH: CPT

## 2020-02-20 PROCEDURE — 84484 ASSAY OF TROPONIN QUANT: CPT

## 2020-02-20 PROCEDURE — 85025 COMPLETE CBC W/AUTO DIFF WBC: CPT

## 2020-02-20 PROCEDURE — 85379 FIBRIN DEGRADATION QUANT: CPT

## 2020-02-20 PROCEDURE — 99285 EMERGENCY DEPT VISIT HI MDM: CPT | Mod: 25

## 2020-02-20 PROCEDURE — 87086 URINE CULTURE/COLONY COUNT: CPT

## 2020-02-20 PROCEDURE — 93010 ELECTROCARDIOGRAM REPORT: CPT | Mod: ,,, | Performed by: INTERNAL MEDICINE

## 2020-02-20 PROCEDURE — 12000002 HC ACUTE/MED SURGE SEMI-PRIVATE ROOM

## 2020-02-20 PROCEDURE — 81000 URINALYSIS NONAUTO W/SCOPE: CPT | Mod: 59

## 2020-02-20 PROCEDURE — 80307 DRUG TEST PRSMV CHEM ANLYZR: CPT

## 2020-02-20 PROCEDURE — 93010 EKG 12-LEAD: ICD-10-PCS | Mod: ,,, | Performed by: INTERNAL MEDICINE

## 2020-02-20 PROCEDURE — 80053 COMPREHEN METABOLIC PANEL: CPT

## 2020-02-20 PROCEDURE — 83690 ASSAY OF LIPASE: CPT

## 2020-02-20 PROCEDURE — 25500020 PHARM REV CODE 255: Performed by: EMERGENCY MEDICINE

## 2020-02-20 RX ORDER — LOSARTAN POTASSIUM AND HYDROCHLOROTHIAZIDE 12.5; 5 MG/1; MG/1
2 TABLET ORAL DAILY
Status: DISCONTINUED | OUTPATIENT
Start: 2020-02-21 | End: 2020-02-20

## 2020-02-20 RX ORDER — MORPHINE SULFATE 10 MG/ML
4 INJECTION INTRAMUSCULAR; INTRAVENOUS; SUBCUTANEOUS
Status: COMPLETED | OUTPATIENT
Start: 2020-02-20 | End: 2020-02-20

## 2020-02-20 RX ORDER — LOSARTAN POTASSIUM AND HYDROCHLOROTHIAZIDE 12.5; 5 MG/1; MG/1
2 TABLET ORAL DAILY
Status: DISCONTINUED | OUTPATIENT
Start: 2020-02-20 | End: 2020-02-21 | Stop reason: DRUGHIGH

## 2020-02-20 RX ADMIN — MORPHINE SULFATE 4 MG: 10 INJECTION INTRAVENOUS at 08:02

## 2020-02-20 RX ADMIN — LOSARTAN POTASSIUM AND HYDROCHLOROTHIAZIDE 2 TABLET: 50; 12.5 TABLET, FILM COATED ORAL at 08:02

## 2020-02-20 RX ADMIN — IOHEXOL 75 ML: 350 INJECTION, SOLUTION INTRAVENOUS at 08:02

## 2020-02-21 ENCOUNTER — TELEPHONE (OUTPATIENT)
Dept: SURGERY | Facility: CLINIC | Age: 40
End: 2020-02-21

## 2020-02-21 ENCOUNTER — ANESTHESIA EVENT (OUTPATIENT)
Dept: ENDOSCOPY | Facility: HOSPITAL | Age: 40
DRG: 330 | End: 2020-02-21
Payer: MEDICAID

## 2020-02-21 ENCOUNTER — ANESTHESIA (OUTPATIENT)
Dept: ENDOSCOPY | Facility: HOSPITAL | Age: 40
DRG: 330 | End: 2020-02-21
Payer: MEDICAID

## 2020-02-21 PROBLEM — K56.609 LARGE BOWEL OBSTRUCTION: Status: ACTIVE | Noted: 2020-02-21

## 2020-02-21 LAB
CANCER AG125 SERPL-ACNC: 7 U/ML (ref 0–30)
CANCER AG19-9 SERPL-ACNC: 4 U/ML (ref 2–40)
CEA SERPL-MCNC: 5.6 NG/ML (ref 0–5)

## 2020-02-21 PROCEDURE — 86304 IMMUNOASSAY TUMOR CA 125: CPT

## 2020-02-21 PROCEDURE — 27201012 HC FORCEPS, HOT/COLD, DISP: Performed by: INTERNAL MEDICINE

## 2020-02-21 PROCEDURE — 45331 SIGMOIDOSCOPY AND BIOPSY: CPT | Performed by: INTERNAL MEDICINE

## 2020-02-21 PROCEDURE — 27201028 HC NEEDLE, SCLERO: Performed by: INTERNAL MEDICINE

## 2020-02-21 PROCEDURE — 63600175 PHARM REV CODE 636 W HCPCS: Performed by: EMERGENCY MEDICINE

## 2020-02-21 PROCEDURE — 63600175 PHARM REV CODE 636 W HCPCS: Performed by: NURSE ANESTHETIST, CERTIFIED REGISTERED

## 2020-02-21 PROCEDURE — 88305 TISSUE EXAM BY PATHOLOGIST: CPT | Mod: 26,,, | Performed by: PATHOLOGY

## 2020-02-21 PROCEDURE — S5010 5% DEXTROSE AND 0.45% SALINE: HCPCS

## 2020-02-21 PROCEDURE — 36415 COLL VENOUS BLD VENIPUNCTURE: CPT

## 2020-02-21 PROCEDURE — D9220A PRA ANESTHESIA: Mod: CRNA,,, | Performed by: NURSE ANESTHETIST, CERTIFIED REGISTERED

## 2020-02-21 PROCEDURE — 86301 IMMUNOASSAY TUMOR CA 19-9: CPT

## 2020-02-21 PROCEDURE — 11000001 HC ACUTE MED/SURG PRIVATE ROOM

## 2020-02-21 PROCEDURE — 82378 CARCINOEMBRYONIC ANTIGEN: CPT

## 2020-02-21 PROCEDURE — 63600175 PHARM REV CODE 636 W HCPCS: Performed by: STUDENT IN AN ORGANIZED HEALTH CARE EDUCATION/TRAINING PROGRAM

## 2020-02-21 PROCEDURE — 37000008 HC ANESTHESIA 1ST 15 MINUTES: Performed by: INTERNAL MEDICINE

## 2020-02-21 PROCEDURE — 25000003 PHARM REV CODE 250

## 2020-02-21 PROCEDURE — D9220A PRA ANESTHESIA: Mod: ANES,,, | Performed by: ANESTHESIOLOGY

## 2020-02-21 PROCEDURE — 63600175 PHARM REV CODE 636 W HCPCS

## 2020-02-21 PROCEDURE — 88305 TISSUE EXAM BY PATHOLOGIST: CPT | Performed by: PATHOLOGY

## 2020-02-21 PROCEDURE — D9220A PRA ANESTHESIA: ICD-10-PCS | Mod: CRNA,,, | Performed by: NURSE ANESTHETIST, CERTIFIED REGISTERED

## 2020-02-21 PROCEDURE — 37000009 HC ANESTHESIA EA ADD 15 MINS: Performed by: INTERNAL MEDICINE

## 2020-02-21 PROCEDURE — D9220A PRA ANESTHESIA: ICD-10-PCS | Mod: ANES,,, | Performed by: ANESTHESIOLOGY

## 2020-02-21 PROCEDURE — 45335 SIGMOIDOSCOPY W/SUBMUC INJ: CPT | Performed by: INTERNAL MEDICINE

## 2020-02-21 PROCEDURE — 88305 TISSUE EXAM BY PATHOLOGIST: ICD-10-PCS | Mod: 26,,, | Performed by: PATHOLOGY

## 2020-02-21 RX ORDER — LIDOCAINE HYDROCHLORIDE 20 MG/ML
INJECTION INTRAVENOUS
Status: DISCONTINUED | OUTPATIENT
Start: 2020-02-21 | End: 2020-02-21

## 2020-02-21 RX ORDER — ONDANSETRON 2 MG/ML
8 INJECTION INTRAMUSCULAR; INTRAVENOUS
Status: COMPLETED | OUTPATIENT
Start: 2020-02-21 | End: 2020-02-21

## 2020-02-21 RX ORDER — ENOXAPARIN SODIUM 100 MG/ML
40 INJECTION SUBCUTANEOUS EVERY 24 HOURS
Status: DISCONTINUED | OUTPATIENT
Start: 2020-02-21 | End: 2020-02-25

## 2020-02-21 RX ORDER — SODIUM CHLORIDE 0.9 % (FLUSH) 0.9 %
10 SYRINGE (ML) INJECTION
Status: DISCONTINUED | OUTPATIENT
Start: 2020-02-21 | End: 2020-02-24

## 2020-02-21 RX ORDER — LOSARTAN POTASSIUM 25 MG/1
100 TABLET ORAL DAILY
Status: DISCONTINUED | OUTPATIENT
Start: 2020-02-21 | End: 2020-02-27

## 2020-02-21 RX ORDER — DULOXETIN HYDROCHLORIDE 30 MG/1
60 CAPSULE, DELAYED RELEASE ORAL DAILY
Status: DISCONTINUED | OUTPATIENT
Start: 2020-02-21 | End: 2020-03-02 | Stop reason: HOSPADM

## 2020-02-21 RX ORDER — SODIUM CHLORIDE 9 MG/ML
1000 INJECTION, SOLUTION INTRAVENOUS
Status: COMPLETED | OUTPATIENT
Start: 2020-02-21 | End: 2020-02-21

## 2020-02-21 RX ORDER — DEXTROSE MONOHYDRATE AND SODIUM CHLORIDE 5; .45 G/100ML; G/100ML
INJECTION, SOLUTION INTRAVENOUS CONTINUOUS
Status: DISCONTINUED | OUTPATIENT
Start: 2020-02-21 | End: 2020-02-26

## 2020-02-21 RX ORDER — PROPOFOL 10 MG/ML
VIAL (ML) INTRAVENOUS
Status: DISPENSED
Start: 2020-02-21 | End: 2020-02-22

## 2020-02-21 RX ORDER — SODIUM CHLORIDE 9 MG/ML
INJECTION, SOLUTION INTRAVENOUS CONTINUOUS PRN
Status: DISCONTINUED | OUTPATIENT
Start: 2020-02-21 | End: 2020-02-21

## 2020-02-21 RX ORDER — MORPHINE SULFATE 10 MG/ML
4 INJECTION INTRAMUSCULAR; INTRAVENOUS; SUBCUTANEOUS EVERY 4 HOURS PRN
Status: DISCONTINUED | OUTPATIENT
Start: 2020-02-21 | End: 2020-02-21

## 2020-02-21 RX ORDER — PROPOFOL 10 MG/ML
VIAL (ML) INTRAVENOUS
Status: DISCONTINUED | OUTPATIENT
Start: 2020-02-21 | End: 2020-02-21

## 2020-02-21 RX ORDER — LIDOCAINE HYDROCHLORIDE 20 MG/ML
INJECTION, SOLUTION EPIDURAL; INFILTRATION; INTRACAUDAL; PERINEURAL
Status: DISPENSED
Start: 2020-02-21 | End: 2020-02-22

## 2020-02-21 RX ORDER — SUCCINYLCHOLINE CHLORIDE 20 MG/ML
INJECTION INTRAMUSCULAR; INTRAVENOUS
Status: DISCONTINUED
Start: 2020-02-21 | End: 2020-02-21 | Stop reason: WASHOUT

## 2020-02-21 RX ORDER — HYDRALAZINE HYDROCHLORIDE 20 MG/ML
10 INJECTION INTRAMUSCULAR; INTRAVENOUS EVERY 6 HOURS PRN
Status: DISCONTINUED | OUTPATIENT
Start: 2020-02-21 | End: 2020-03-02 | Stop reason: HOSPADM

## 2020-02-21 RX ORDER — MORPHINE SULFATE 10 MG/ML
4 INJECTION INTRAMUSCULAR; INTRAVENOUS; SUBCUTANEOUS EVERY 4 HOURS PRN
Status: DISCONTINUED | OUTPATIENT
Start: 2020-02-21 | End: 2020-02-22

## 2020-02-21 RX ORDER — TRAZODONE HYDROCHLORIDE 50 MG/1
50 TABLET ORAL NIGHTLY
Status: DISCONTINUED | OUTPATIENT
Start: 2020-02-21 | End: 2020-03-02 | Stop reason: HOSPADM

## 2020-02-21 RX ORDER — ENOXAPARIN SODIUM 100 MG/ML
40 INJECTION SUBCUTANEOUS EVERY 24 HOURS
Status: DISCONTINUED | OUTPATIENT
Start: 2020-02-21 | End: 2020-02-21

## 2020-02-21 RX ORDER — HYDROMORPHONE HYDROCHLORIDE 2 MG/ML
0.5 INJECTION, SOLUTION INTRAMUSCULAR; INTRAVENOUS; SUBCUTANEOUS
Status: DISCONTINUED | OUTPATIENT
Start: 2020-02-21 | End: 2020-02-22

## 2020-02-21 RX ADMIN — SODIUM CHLORIDE 1000 ML: 0.9 INJECTION, SOLUTION INTRAVENOUS at 03:02

## 2020-02-21 RX ADMIN — SODIUM CHLORIDE: 0.9 INJECTION, SOLUTION INTRAVENOUS at 01:02

## 2020-02-21 RX ADMIN — PROPOFOL 40 MG: 10 INJECTION, EMULSION INTRAVENOUS at 02:02

## 2020-02-21 RX ADMIN — ONDANSETRON HYDROCHLORIDE 8 MG: 2 SOLUTION INTRAMUSCULAR; INTRAVENOUS at 03:02

## 2020-02-21 RX ADMIN — HYDROMORPHONE HYDROCHLORIDE 0.5 MG: 2 INJECTION, SOLUTION INTRAMUSCULAR; INTRAVENOUS; SUBCUTANEOUS at 05:02

## 2020-02-21 RX ADMIN — DULOXETINE HYDROCHLORIDE 60 MG: 30 CAPSULE, DELAYED RELEASE ORAL at 09:02

## 2020-02-21 RX ADMIN — MORPHINE SULFATE 4 MG: 10 INJECTION INTRAVENOUS at 08:02

## 2020-02-21 RX ADMIN — MORPHINE SULFATE 4 MG: 10 INJECTION INTRAVENOUS at 03:02

## 2020-02-21 RX ADMIN — LOSARTAN POTASSIUM 100 MG: 25 TABLET ORAL at 09:02

## 2020-02-21 RX ADMIN — DEXTROSE AND SODIUM CHLORIDE: 5; .45 INJECTION, SOLUTION INTRAVENOUS at 08:02

## 2020-02-21 RX ADMIN — DEXTROSE AND SODIUM CHLORIDE: 5; .45 INJECTION, SOLUTION INTRAVENOUS at 05:02

## 2020-02-21 RX ADMIN — MORPHINE SULFATE 4 MG: 10 INJECTION INTRAVENOUS at 12:02

## 2020-02-21 RX ADMIN — HYDRALAZINE HYDROCHLORIDE 10 MG: 20 INJECTION INTRAMUSCULAR; INTRAVENOUS at 05:02

## 2020-02-21 RX ADMIN — HYDROMORPHONE HYDROCHLORIDE 0.5 MG: 2 INJECTION, SOLUTION INTRAMUSCULAR; INTRAVENOUS; SUBCUTANEOUS at 10:02

## 2020-02-21 RX ADMIN — Medication 100 MG: at 02:02

## 2020-02-21 RX ADMIN — PROPOFOL 80 MG: 10 INJECTION, EMULSION INTRAVENOUS at 02:02

## 2020-02-21 RX ADMIN — ENOXAPARIN SODIUM 40 MG: 100 INJECTION SUBCUTANEOUS at 09:02

## 2020-02-21 RX ADMIN — MORPHINE SULFATE 4 MG: 10 INJECTION INTRAVENOUS at 04:02

## 2020-02-21 RX ADMIN — TRAZODONE HYDROCHLORIDE 50 MG: 50 TABLET ORAL at 08:02

## 2020-02-21 NOTE — PLAN OF CARE
02/21/20 1434   Discharge Assessment   Assessment Type Discharge Planning Assessment   Confirmed/corrected address and phone number on facesheet? Yes   Assessment information obtained from? Medical Record   Communicated expected length of stay with patient/caregiver no   Prior to hospitilization cognitive status: Alert/Oriented   Prior to hospitalization functional status: Independent   Current cognitive status: Alert/Oriented   Current Functional Status: Needs Assistance   Lives With alone   Able to Return to Prior Arrangements yes   Is patient able to care for self after discharge? Unable to determine at this time (comments)   Who are your caregiver(s) and their phone number(s)?   (MotherNegra, [phone])   Patient's perception of discharge disposition home or selfcare   Readmission Within the Last 30 Days no previous admission in last 30 days   Patient currently being followed by outpatient case management? No   Patient currently receives any other outside agency services? No   Equipment Currently Used at Home none   Do you have any problems affording any of your prescribed medications? No   Is the patient taking medications as prescribed? yes   Does the patient have transportation home? Yes   Transportation Anticipated family or friend will provide   Does the patient receive services at the [facility]? No   Discharge Plan A Home   DME Needed Upon Discharge  none   Patient/Family in Agreement with Plan unable to assess     Pt was having a flexible sigmoidoscopy.    [facility], [place] - [address]  [address]  [facility] [address]  Phone: [phone] Fax: [phone]    [id number] [place] [address] [place] AT [address] [place] [address]  Phone: [phone] Fax: [phone]

## 2020-02-21 NOTE — CARE UPDATE
AL supervisor,  Was notified by [facility] supervisor, that pt. had unsupervised minor children present at the hospital.  Pt's sister was also reportedly admitted to OBGYN Unit, and her children were also present at the hospital.   AL supervisor met with pt. To discuss the need for a family member to come to [facility] to remove the minor children from the premises, or DCFS leona be contacted due to lack of appropriate supervsion. Pt. Stated she had already spoken with her mother, Negra ([phone]) regarding help and assistance with pt's and her sister's children.    AL called Negra to verify that she was indeed enroute from [place], [place]. Negra stated she was aware that the children needed to be removed from hospital premises, btu she was waiting for another family member to come with her to drive pt's car back to their home town.  AL supervisor thanked Negra for her assistance.   AL was unable to complete pt's discharge planning assessment due to pt. being nauseated and vomiting.

## 2020-02-21 NOTE — ED TRIAGE NOTES
Patient states she began with midepigastric and RUQ abdominal pain this am. States nausea and vomiting clear/yellow emesis today. LBM over 6 days ago. Denies chest pain/sob. At first she states she hasn't had this n/v prior but then states she had the same issue on Tuesday.

## 2020-02-21 NOTE — NURSING
Patient's blood pressure 174/77, HR 79.  MD Dr. Liang notified. New orders received for hydralazine

## 2020-02-21 NOTE — ED PROVIDER NOTES
"Encounter Date: 2020    SCRIBE #1 NOTE: I, Cristina Pham, am scribing for, and in the presence of,  Kristy Ge MD. I have scribed the following portions of the note - Other sections scribed: HPI/ROS/PE.       History     Chief Complaint   Patient presents with    Abdominal Pain     pt reports x1 month of bilateral abd pain. NV noted. denies dysuria, or bleeding. pt was recently here for similar symptoms and pain has increased.      This 39 y.o. female with a medical history of arthritis, DVT, and HTN presents to the ED for an emergent evaluation of generalized abdominal pain x 3 days. Pt states that the pain is "all around but not in the middle." Pt reports pain is constant, but she experiences an intermittent cramping sensation to the epigastric region that lasts for minutes. Pain is exacerbated with inspiration, leaning forwards, and with standing. Pt also notes constipation as LBM was 6 days ago. Pt also reports L popliteal pain. Pt states she had a DVT in 2018 and has not been taking Eliquis since then 2/2 running out of the medication. Of note, pt states she hasn't taken antihypertensive medications in 2 days. Pt is a smoker. No alleviating factors. No known allergies to medications. Otherwise, pt denies fever, chills, SOB, chest pain, hematemesis, n/v, and any other associated symptoms.     The history is provided by the patient. No  was used.     Review of patient's allergies indicates:  No Known Allergies  Past Medical History:   Diagnosis Date    Arthritis     DVT (deep venous thrombosis)     Hypertension      Past Surgical History:   Procedure Laterality Date     SECTION, LOW TRANSVERSE  2010    TUBAL LIGATION      2010     Family History   Problem Relation Age of Onset    Hypertension Mother      Social History     Tobacco Use    Smoking status: Former Smoker     Years: 8.00     Types: Cigarettes, Cigars    Smokeless tobacco: Never Used    " Tobacco comment: black & neha   Substance Use Topics    Alcohol use: Never     Alcohol/week: 1.0 standard drinks     Types: 1 Glasses of wine per week     Frequency: Never    Drug use: Yes     Types: Marijuana     Comment: at first denies but then says that she last smoked a couple of days ago     Review of Systems   Constitutional: Negative for chills and fever.   HENT: Negative for congestion, rhinorrhea and sore throat.    Eyes: Negative for visual disturbance.   Respiratory: Negative for cough and shortness of breath.    Cardiovascular: Negative for chest pain.   Gastrointestinal: Positive for abdominal pain and constipation. Negative for diarrhea, nausea and vomiting.   Genitourinary: Negative for difficulty urinating.   Musculoskeletal: Positive for myalgias (L leg). Negative for neck stiffness.   Skin: Negative for rash.   Neurological: Negative for headaches.       Physical Exam     Initial Vitals [02/20/20 1803]   BP Pulse Resp Temp SpO2   (!) 179/111 94 18 98.4 °F (36.9 °C) 100 %      MAP       --         Physical Exam    Nursing note and vitals reviewed.  Constitutional: She appears well-developed and well-nourished. She is not diaphoretic. No distress.   Morbidly obese.    HENT:   Head: Atraumatic.   Eyes: EOM are normal.   Neck: Normal range of motion. Neck supple.   Cardiovascular: Normal rate and regular rhythm.   Pulmonary/Chest: Breath sounds normal.   Abdominal: Soft. She exhibits no mass. There is tenderness (diffuse; mild-to-moderate). There is no rebound and no guarding.   Mild diffuse tenderness to palpation   Musculoskeletal: Normal range of motion.   Bilateral, symmetrical LE 2+ pitting edema with no erythema, induration, or acute skin changes.    Neurological: She is alert and oriented to person, place, and time.   Skin: Skin is warm and dry. No rash noted.   Psychiatric: She has a normal mood and affect.         ED Course   Procedures  Labs Reviewed   D DIMER, QUANTITATIVE - Abnormal;  Notable for the following components:       Result Value    D-Dimer 3.10 (*)     All other components within normal limits   CBC W/ AUTO DIFFERENTIAL - Abnormal; Notable for the following components:    RBC 3.83 (*)     Hemoglobin 9.9 (*)     Hematocrit 32.6 (*)     Mean Corpuscular Hemoglobin 25.8 (*)     Mean Corpuscular Hemoglobin Conc 30.4 (*)     RDW 16.0 (*)     All other components within normal limits   COMPREHENSIVE METABOLIC PANEL - Abnormal; Notable for the following components:    ALT 7 (*)     All other components within normal limits   URINALYSIS, REFLEX TO URINE CULTURE - Abnormal; Notable for the following components:    Ketones, UA 1+ (*)     Leukocytes, UA 2+ (*)     All other components within normal limits    Narrative:     Preferred Collection Type->Urine, Clean Catch   URINALYSIS MICROSCOPIC - Abnormal; Notable for the following components:    WBC, UA 12 (*)     Bacteria Few (*)     All other components within normal limits    Narrative:     Preferred Collection Type->Urine, Clean Catch   CULTURE, URINE   DRUG SCREEN PANEL, URINE EMERGENCY   LIPASE   TROPONIN I   TROPONIN I          Imaging Results           CT Abdomen Pelvis  Without Contrast (Final result)  Result time 02/21/20 02:29:36    Final result by Chino Adame MD (02/21/20 02:29:36)                 Impression:      Abnormal findings referable to the colon concerning for the possibility of an obstructing malignant lesion involving the proximal aspect of the descending colon as detailed above.  Clinical and historical correlation is needed, further evaluation with colonoscopy or fluoroscopic barium examination is recommended.    There is distention of the right colon, transverse colon and proximal descending colon associated with the aforementioned, and there is mild edema/inflammation along the distal transverse colon, and splenic flexure.    Enlarged retroperitoneal lymph node on the right as discussed above.    Dilated small  bowel loops are noted however extending to the terminal ileum and may relate to the colonic distention.    Thickened enlarged appearance of the appendix, to some degree may relate to a baseline appearance although more prominent than on the prior study and therefore in part may relate to the small-bowel distention, primary periappendiceal inflammatory process not otherwise seen.    This report was flagged in Epic as abnormal.      Electronically signed by: Chino Adame  Date:    02/21/2020  Time:    02:29             Narrative:    EXAMINATION:  CT ABDOMEN PELVIS WITHOUT CONTRAST    CLINICAL HISTORY:  Abdominal pain, unspecified;    TECHNIQUE:  Low dose axial images, sagittal and coronal reformations were obtained from the lung bases to the pubic symphysis.  Intravenous contrast and oral contrast was not utilized.    COMPARISON:  December 4, 2016    FINDINGS:  The visualized lung bases demonstrate mild motion artifact and atelectatic change.  There is nonspecific mild-to-moderate distention of the stomach with fluid and air and ingested material.  Mild layering density of the gallbladder may relate to sludge and/or cholelithiasis, alternatively may relate to vicarious excretion of previously administered intravenous contrast, there is no evidence for pericholecystic or peripancreatic inflammatory change.  When accounting for limitations of the examination there is no evidence for acute process of the liver, spleen, or adrenal glands.  There is no hydronephrosis or obstructive uropathy bilaterally.  There is residual excreted contrast within the collecting structures of the kidneys, and the urinary bladder as well as along the right ureter.  The abdominal aorta appears normal in caliber otherwise not well evaluated on this examination.  Evaluation for adenopathy is limited on this examination however there are small retroperitoneal lymph nodes noted, there is also an enlarged lymph node seen just to the right of the  IVC, overlying the right psoas muscle, and appearing just anterior to the right ovarian vein, as best seen on axial image 80 measuring approximately 14.9 x 22.7 mm in size.    The urinary bladder demonstrates dense opacification with oral contrast, as unremarkable appearance however is not well evaluated, this obscures structures of the lower pelvis to some degree, evaluation of the uterus and adnexa is limited, there is no evidence for dominant adnexal mass or cystic collection.    There are dilated small bowel loops throughout the abdomen and pelvis, mild to moderate small bowel distention with fluid and air.  This extends to the level of the terminal ileum.  The appendix appears prominent and thickened particularly the distal appendix however this appearance is similar to the prior examination and may relate to variant anatomy although is somewhat more prominent, which could relate to the aforementioned bowel distention.  There is moderate to prominent distention involving the right colon, and transverse colon, with mild-to-moderate distention involving the splenic flexure, and proximal descending colon.  There is some mild pericolonic edema suggested along the distal transverse colon, and splenic flexure that may relate to mild superimposed inflammation/edema or colitis.  As best seen on coronal images 89 through 98, axial images 43 through 59 there is abrupt transition from dilated proximal descending colon to decompressed descending colon.  Adjacent to this there are multiple small lymph nodes, with an associated finding that may relate to an enlarged lymph node or cluster of smaller lymph nodes as seen on axial image 50 measuring up to approximately 15.7 by 22.7 mm.  The possibility that this represents an inflammatory process or a stricture is a consideration however the combined findings are most concerning for malignancy.  Clinical and historical correlation is needed if previously unknown further  evaluation with colonoscopy or fluoroscopic barium examination is recommended.  The remainder of the descending colon and the rectosigmoid colon do not appear abnormally distended there is some mild air and stool at the rectum and distal sigmoid colon.  There is no evidence for pneumatosis, there is no evidence for free intraperitoneal air, there is no evidence for mesenteric venous or portal venous air.  The visualized osseous structures appear intact.  Chronic changes are noted.                               X-Ray Abdomen Flat And Erect (Final result)  Result time 02/21/20 01:11:40    Final result by Chino Adame MD (02/21/20 01:11:40)                 Impression:      There are mildly prominent air-filled small bowel loops and colonic loops with some air-fluid levels of the small bowel and colon noted, there is relative paucity of air within the distal colon, this is nonspecific although the possibility of a distal obstructing process would be in the differential.  Clinical and historical correlation is otherwise needed.      Electronically signed by: Chino Adame  Date:    02/21/2020  Time:    01:11             Narrative:    EXAMINATION:  XR ABDOMEN FLAT AND ERECT    CLINICAL HISTORY:  Constipation, unspecified    TECHNIQUE:  Flat and erect AP views of the abdomen were performed.    COMPARISON:  February 18, 2020    FINDINGS:  Abdominal radiographic examination is submitted, 3 radiographs are submitted.  There is no evidence for free intraperitoneal air.  There is appearance thought to represent mild-to-moderate air-filled distention of the colon, predominantly the right colon and transverse colon and splenic flexure and the proximal to mid descending colon, relative paucity of air seen along the distal colon.  There are some prominent air-filled small bowel loops noted as well.  An on the erect view there appear to be some air-filled levels within the colon and small bowel.  Clinical and historical  correlation is needed the possibility of a distal colonic obstructing process would be in the differential.    There is contrast density within the collecting structures of the kidneys and the urinary bladder likely relating to recent CT examination.  Calcifications of the pelvis are nonspecific may relate to phleboliths.  The osseous structures demonstrate chronic change.                               US Lower Extremity Veins Left (Final result)  Result time 02/21/20 00:20:30    Final result by Britany Monzon MD (02/21/20 00:20:30)                 Impression:      Limited examination.  No evidence of deep venous thrombosis in the left lower extremity.    Possible Baker's cyst.      Electronically signed by: Britany Monzon  Date:    02/21/2020  Time:    00:20             Narrative:    EXAMINATION:  US LOWER EXTREMITY VEINS LEFT    CLINICAL HISTORY:  Pain in leg, unspecified    TECHNIQUE:  Duplex and color flow Doppler evaluation and graded compression of the left lower extremity veins was performed.    COMPARISON:  11/14/2018    FINDINGS:  Left thigh veins: The common femoral, femoral, popliteal, upper greater saphenous, and deep femoral veins are patent and free of thrombus. The veins are normally compressible and have normal phasic flow and augmentation response.    Left calf veins: The visualized calf veins are patent.    Contralateral CFV: The contralateral (right) common femoral vein is patent and free of thrombus.    Miscellaneous: Limited evaluation secondary to habitus.  There is a focal fluid collection measuring 2.8 x 0.9 x 2.2 cm.                               CTA Chest Non-Coronary (PE Study) (Final result)  Result time 02/20/20 21:58:29    Final result by Britany Monzon MD (02/20/20 21:58:29)                 Impression:      Suboptimal intravenous bolus.  No evidence of acute pulmonary embolism in the pulmonary trunk or main pulmonary arteries.      Electronically signed by: Britany  Nicolás  Date:    02/20/2020  Time:    21:58             Narrative:    EXAMINATION:  CT PULMONARY ANGIOGRAM WITH CONTRAST    CLINICAL HISTORY:  Chest pain.    TECHNIQUE:  CT of the chest with intravenous contrast for pulmonary artery angiogram was performed. Contiguous axial 1.25 mm images followed by 10 mm reconstructions with multiplanar and MIP reformations of the pulmonary arteries. No 3D post-angiographic imaging was performed on an independent workstation and reviewed.  75 ml of Omnipaque 350 was injected.    COMPARISON:  08/27/2018    FINDINGS:  A suboptimal intravenous bolus is present.  There is no evidence of pulmonary artery filling defect to suggest pulmonary embolism in the pulmonary trunk or main pulmonary arteries.  There is no aortic aneurysm or aortic dissection.    Moderate bibasilar atelectasis or scarring is present.    There is no evidence of mediastinal, hilar, or axillary adenopathy.    There is no pleural or pericardial effusion.    The heart size is within normal limits.                                 39-year-old morbidly obese female with a history of hypertension presents the ED with vague abdominal pain and left leg pain. Patient with a history of DVT not on anticoagulation.  She is describing a vague abdominal pain that she locates mostly in the upper abdomen.  There is also pain in the popliteal fossa.  My differential includes PE, DVT, other intra thoracic etiologies with for for pain to the abdomen, or a primarily intra-abdominal infection versus other.  It is hard to examine the patient secondary to her body habitus.  Labs ordered which revealed reassuring CBC and CMP with elevated D-dimer.  A CT PE was added which shows no PE or other intrathoracic process.  A DVT scan was added which was also negative. Patient was intermittently feeling better but then started having abdominal pain again and some nausea therefore x-ray of the abdomen was added.  It was similar to previous study  that was done recently.  Patient was monitored and started to feel better.  She is continually asked different people for something to eat and drink.  The patient was given apple juice which at 1st made her feel better.  I went in to re-evaluate her and talked her about possibly using a Fleet enema for constipation.  At this time patient started to feel more abdominal pain and some nausea.  She did vomit some of the apple juice therefore CT of the abdomen was added time.  CT of the abdomen revealed an obstruction likely neoplastic.  I have updated the patient of the new finding.  At this cut the case with surgery.  Patient was admitted to the surgical team for re-evaluation in the morning.  She will remain NPO with fluids.  Pain control and Zofran as needed.  If she continues to vomit we will add an NG tube.  CAROLE Ge MD  4:03 AM                      Scribe Attestation:   Scribe #1: I performed the above scribed service and the documentation accurately describes the services I performed. I attest to the accuracy of the note.                          Clinical Impression:       ICD-10-CM ICD-9-CM   1. Abdominal pain, unspecified abdominal location R10.9 789.00   2. Abdominal pain R10.9 789.00   3. Constipation K59.00 564.00   4. Leg pain M79.606 729.5   5. Constipation, unspecified constipation type K59.00 564.00   6. Large bowel obstruction K56.609 560.9           Scribe attestation: I, Kristy Ge, personally performed the services described in this documentation. All medical record entries made by the scribe were at my direction and in my presence.  I have reviewed the chart and agree that the record reflects my personal performance and is accurate and complete.                   Kristy Ge MD  02/21/20 0407

## 2020-02-21 NOTE — TRANSFER OF CARE
"Anesthesia Transfer of Care Note    Patient: Maryam Oneal    Procedure(s) Performed: Procedure(s) (LRB):  SIGMOIDOSCOPY, FLEXIBLE (N/A)    Patient location: GI    Anesthesia Type: general    Transport from OR: Transported from OR on room air with adequate spontaneous ventilation    Post pain: adequate analgesia    Post assessment: no apparent anesthetic complications and tolerated procedure well    Post vital signs: stable    Level of consciousness: responds to stimulation and sedated    Nausea/Vomiting: no nausea/vomiting    Complications: none    Transfer of care protocol was followed      Last vitals:   Visit Vitals  BP (!) 140/61 (BP Location: Right arm, Patient Position: Lying)   Pulse 85   Temp 37.3 °C (99.1 °F) (Oral)   Resp 10   Ht 6' 4" (1.93 m)   Wt (!) 208.5 kg (459 lb 10.6 oz)   LMP 02/07/2020   SpO2 98%   Breastfeeding? No   BMI 55.95 kg/m²     "

## 2020-02-21 NOTE — NURSING
Patient arrived back on the unit via stretcher to room 404.  NG tube connected to low intermittent suction per MD order.

## 2020-02-21 NOTE — NURSING
Patient given 1 tap water enema, and had a moderate sized bowel movement.  Will repeat in 1 hour until runs clear.  Patient stated that she had some mild relief after her bowel movement.

## 2020-02-21 NOTE — CONSULTS
.[facility]  Gastroenterology  Consult Note    Patient Name: Maryam Oneal  MRN: [id number]  Admission Date: 2020  Hospital Length of Stay: 0 days  Code Status: Full Code   Primary Care Physician: Phuong Reynoso MD  Principal Problem:<principal problem not specified>    Consults  Subjective:     Chief complaint: Abdominal pain    HPI: Recent onset, weeks, worsening, diffuse, non-radiating, abd pain associated with poor appetite and distension.  Some associated nausea as well.  Constipation as well recently.  No melena or hematochezia.  No heavy NSAID use.  No dysphagia.  No recent travel or sick contacts.      Past medical history:  Past Medical History:   Diagnosis Date    Arthritis     DVT (deep venous thrombosis)     Hypertension        Past surgical history:  Past Surgical History:   Procedure Laterality Date     SECTION, LOW TRANSVERSE  2010    TUBAL LIGATION             Social history:  Social History     Socioeconomic History    Marital status: Single     Spouse name: Not on file    Number of children: 8    Years of education: Not on file    Highest education level: Not on file   Occupational History    Not on file   Social Needs    Financial resource strain: Not on file    Food insecurity:     Worry: Not on file     Inability: Not on file    Transportation needs:     Medical: Not on file     Non-medical: Not on file   Tobacco Use    Smoking status: Former Smoker     Years: 8.00     Types: Cigarettes, Cigars    Smokeless tobacco: Never Used    Tobacco comment: black & mals   Substance and Sexual Activity    Alcohol use: Never     Alcohol/week: 1.0 standard drinks     Types: 1 Glasses of wine per week     Frequency: Never    Drug use: Yes     Types: Marijuana     Comment: at first denies but then says that she last smoked a couple of days ago    Sexual activity: Not Currently   Lifestyle    Physical activity:     Days per week: Not on file     Minutes  per session: Not on file    Stress: Not on file   Relationships    Social connections:     Talks on phone: Not on file     Gets together: Not on file     Attends [employer] service: Not on file     Active member of club or organization: Not on file     Attends meetings of clubs or organizations: Not on file     Relationship status: Not on file   Other Topics Concern    Patient feels they ought to cut down on drinking/drug use No    Patient annoyed by others criticizing their drinking/drug use No    Patient has felt bad or guilty about drinking/drug use No    Patient has had a drink/used drugs as an eye opener in the AM No   Social History Narrative    Not on file       Family history:  Family History   Problem Relation Age of Onset    Hypertension Mother        Medications:  Medications Prior to Admission   Medication Sig Dispense Refill Last Dose    docusate sodium (COLACE) 100 MG capsule Take 1 capsule (100 mg total) by mouth 2 (two) times daily. 60 capsule 0 2/20/2020    losartan (COZAAR) 100 MG tablet Take 1 tablet (100 mg total) by mouth once daily. 30 tablet 0 Past Week    acetaminophen (TYLENOL) 325 MG tablet Take 2 tablets (650 mg total) by mouth every 6 (six) hours as needed. 13 tablet 0     apixaban (ELIQUIS) 2.5 mg Tab Take 2.5 mg by mouth 2 (two) times daily.   Unknown    DULoxetine (CYMBALTA) 60 MG capsule Take 1 capsule (60 mg total) by mouth once daily. 30 capsule 0 Unknown    losartan-hydrochlorothiazide 100-25 mg (HYZAAR) 100-25 mg per tablet Take 1 tablet by mouth once daily.   2/18/2020    traZODone (DESYREL) 50 MG tablet Take 1 tablet (50 mg total) by mouth every evening. 30 tablet 0 Unknown       Allergies:  Review of patient's allergies indicates:  No Known Allergies    Review of systems:  CONSTITUTIONAL: Negative for fever, chills, weakness, weight loss, weight gain.  HEENT: Negative for blurred vision, hearing loss, nasal congestion, dry mouth, sore throat.  CARDIOVASCULAR:  Negative for chest pain or palpitations.  RESPIRATORY: Negative for SOB or cough.  GASTROINTESTINAL: See HPI    Objective:     Vital Signs (Most Recent):  Temp: 97.8 °F (36.6 °C) (02/21/20 1334)  Pulse: 79 (02/21/20 1334)  Resp: 18 (02/21/20 1334)  BP: (!) 176/78 (02/21/20 1334)  SpO2: 98 % (02/21/20 1334) Vital Signs (24h Range):  Temp:  [97.8 °F (36.6 °C)-99 °F (37.2 °C)] 97.8 °F (36.6 °C)  Pulse:  [79-94] 79  Resp:  [18-20] 18  SpO2:  [95 %-100 %] 98 %  BP: (129-194)/() 176/78     Physical examination:  General: well developed, well nourished, no apparent distress  HENT: NCAT, hearing grossly intact, no palpable or visible thyroid mass  Eyes: PERRL, EOMI, anicteric sclera  LYMH: No cervical, supraclavicular or axillary lymphadenopathy   Cardiovascular: Regular rate and rhythm. No murmurs appreciated.  Lungs: Non-labored respirations. Breath sounds equal.   Abdomen: soft, NTND, normoactive BS  Neuro: AA&O x 3, no asterixes or tremors  Skin: No jaundice, rashes or lesions    Labs:  Hb 9.9    Imaging:  CT reviewed      Assessment:     Colonic obstruction    Plan:   Flex sig today    Thank you for your consult.     Ez Bartlett MD  Gastroenterology  [facility]

## 2020-02-21 NOTE — [employer] PATIENT INSTRUCTIONS
Discharge Summary/Instructions after an Endoscopic Procedure  Patient Name: Maryam Oneal  Patient MRN: [id number]  Patient YOB: 1980  Friday, February 21, 2020  Ez Bartlett MD  RESTRICTIONS:  During your procedure today, you received medications for sedation.  These   medications may affect your judgment, balance and coordination.  Therefore,   for 24 hours, you have the following restrictions:   - DO NOT drive a car, operate machinery, make legal/financial decisions,   sign important papers or drink alcohol.    ACTIVITY:  Today: no heavy lifting, straining or running due to procedural   sedation/anesthesia.  The following day: return to full activity including work.  DIET:  Eat and drink normally unless instructed otherwise.     TREATMENT FOR COMMON SIDE EFFECTS:  - Mild abdominal pain, nausea, belching, bloating or excessive gas:  rest,   eat lightly and use a heating pad.  - Sore Throat: treat with throat lozenges and/or gargle with warm salt   water.  - Because air was used during the procedure, expelling large amounts of air   from your rectum or belching is normal.  - If a bowel prep was taken, you may not have a bowel movement for 1-3 days.    This is normal.  SYMPTOMS TO WATCH FOR AND REPORT TO YOUR PHYSICIAN:  1. Abdominal pain or bloating, other than gas cramps.  2. Chest pain.  3. Back pain.  4. Signs of infection such as: chills or fever occurring within 24 hours   after the procedure.  5. Rectal bleeding, which would show as bright red, maroon, or black stools.   (A tablespoon of blood from the rectum is not serious, especially if   hemorrhoids are present.)  6. Vomiting.  7. Weakness or dizziness.  GO DIRECTLY TO THE NEAREST EMERGENCY ROOM IF YOU HAVE ANY OF THE FOLLOWING:      Difficulty breathing              Chills and/or fever over 101 F   Persistent vomiting and/or vomiting blood   Severe abdominal pain   Severe chest pain   Black, tarry stools   Bleeding- more than one  tablespoon   Any other symptom or condition that you feel may need urgent attention  Your doctor recommends these additional instructions:  If any biopsies were taken, your doctors clinic will contact you in 1 to 2   weeks with any results.  - [facility].   - NPO today.   - I have consulted surgery.  - Await pathology result.  - Call us back in the meantime if we can assist further - Dr. Zhang   covering this weekend.    - The findings and recommendations were discussed with the patient and their   family.  For questions, problems or results please call your physician - Ez Bartlett MD at Work:  [phone].  [facility] can be reached at [phone]     IF A COMPLICATION OR EMERGENCY SITUATION ARISES AND YOU ARE UNABLE TO REACH   YOUR PHYSICIAN - GO DIRECTLY TO THE EMERGENCY ROOM.  MD Ez Elizabeth MD  2/21/2020 2:32:01 PM  This report has been verified and signed electronically.  [employer]

## 2020-02-21 NOTE — H&P
[facility]    History & Physical      Patient Name: Maryam Oneal  MRN: [id number]  Admission Date: 2020  Attending Physician: Dariel Mcginnis MD   Primary Care Provider: Phuong Reynoso MD         Patient information was obtained from patient and ER records.     Subjective:     Principal Problem:<principal problem not specified>    Chief Complaint:   Chief Complaint   Patient presents with    Abdominal Pain     pt reports x1 month of bilateral abd pain. NV noted. denies dysuria, or bleeding. pt was recently here for similar symptoms and pain has increased.         HPI: 40yo F with two months history of cramping pain and poor appetite found to have large bowel obstruction on CT with likely mass at splenic flexure.  GI consult for endoscopy.    Past Medical History:   Diagnosis Date    Arthritis     DVT (deep venous thrombosis)     Hypertension        Past Surgical History:   Procedure Laterality Date     SECTION, LOW TRANSVERSE  2010    TUBAL LIGATION             Review of patient's allergies indicates:  No Known Allergies    No current facility-administered medications on file prior to encounter.      Current Outpatient Medications on File Prior to Encounter   Medication Sig    docusate sodium (COLACE) 100 MG capsule Take 1 capsule (100 mg total) by mouth 2 (two) times daily.    losartan (COZAAR) 100 MG tablet Take 1 tablet (100 mg total) by mouth once daily.    acetaminophen (TYLENOL) 325 MG tablet Take 2 tablets (650 mg total) by mouth every 6 (six) hours as needed.    apixaban (ELIQUIS) 2.5 mg Tab Take 2.5 mg by mouth 2 (two) times daily.    DULoxetine (CYMBALTA) 60 MG capsule Take 1 capsule (60 mg total) by mouth once daily.    losartan-hydrochlorothiazide 100-25 mg (HYZAAR) 100-25 mg per tablet Take 1 tablet by mouth once daily.    traZODone (DESYREL) 50 MG tablet Take 1 tablet (50 mg total) by mouth every evening.     Family History     Problem Relation (Age  of Onset)    Hypertension Mother        Tobacco Use    Smoking status: Former Smoker     Years: 8.00     Types: Cigarettes, Cigars    Smokeless tobacco: Never Used    Tobacco comment: black & mals   Substance and Sexual Activity    Alcohol use: Never     Alcohol/week: 1.0 standard drinks     Types: 1 Glasses of wine per week     Frequency: Never    Drug use: Yes     Types: Marijuana     Comment: at first denies but then says that she last smoked a couple of days ago    Sexual activity: Not Currently     Review of Systems   Constitutional: Negative for chills and fever.   HENT: Negative.    Eyes: Negative.    Respiratory: Negative for cough and shortness of breath.    Cardiovascular: Negative for chest pain and palpitations.   Gastrointestinal: Positive for abdominal pain, constipation, nausea and vomiting.   Endocrine: Negative.    Genitourinary: Negative.    Musculoskeletal: Negative.    Allergic/Immunologic: Negative.    Neurological: Negative.    Hematological: Negative for adenopathy. Does not bruise/bleed easily.   Psychiatric/Behavioral: Negative.         Objective:     Vital Signs (Most Recent):  Temp: 99.1 °F (37.3 °C) (02/21/20 1432)  Pulse: 85 (02/21/20 1447)  Resp: 18 (02/21/20 1447)  BP: (!) 140/70 (02/21/20 1447)  SpO2: 100 % (02/21/20 1447) Vital Signs (24h Range):  Temp:  [97.8 °F (36.6 °C)-99.1 °F (37.3 °C)] 99.1 °F (37.3 °C)  Pulse:  [79-94] 85  Resp:  [10-20] 18  SpO2:  [95 %-100 %] 100 %  BP: (129-194)/() 140/70     Weight: (!) 208.5 kg (459 lb 10.6 oz)  Body mass index is 55.95 kg/m².    Physical Exam   Constitutional: She is oriented to person, place, and time. She appears well-developed and well-nourished.   HENT:   Head: Normocephalic and atraumatic.   Eyes: Pupils are equal, round, and reactive to light. EOM are normal.   Neck: Normal range of motion. Neck supple.   Cardiovascular: Normal rate and regular rhythm.   Pulmonary/Chest: Effort normal and breath sounds normal.    Abdominal: Soft. There is tenderness. There is no guarding.   Neurological: She is alert and oriented to person, place, and time.   Skin: Skin is warm and dry.   Morbidly obese.        Assessment/Plan:     40yo F with morbid obesity presents with large bowel obstruction likely 2/2 lesion of the splenic flexure.      -GI consulted for endosocpy  -NPO, NGT  -Will send tumour markers  -Will likely need surgical intervention this admission    Joseph Liagn MD  [facility] Surgery Resident  C: [phone]

## 2020-02-21 NOTE — TELEPHONE ENCOUNTER
Consult given to Dr. Ag        ----- Message from Elida Hyde sent at 2/21/2020  3:27 PM CST -----  Contact: Heena-   Type: Patient Call Back    Who called:Heena    What is the request in detail: Hospital Consult: Room 404    Best call back number:[phone]

## 2020-02-21 NOTE — ANESTHESIA PREPROCEDURE EVALUATION
2020    Pre-operative evaluation for Procedure(s) (LRB):  SIGMOIDOSCOPY, FLEXIBLE (N/A)    Maryam Oneal is a 39 y.o. female     Patient Active Problem List   Diagnosis    Headache    Chest pain    Acute deep vein thrombosis (DVT) of left lower extremity    HTN (hypertension)    Anemia of chronic disease    Morbid obesity with BMI of 50.0-59.9, adult    Peripheral edema    DVT (deep venous thrombosis)    Major depressive disorder, recurrent episode, severe    Depression    Large bowel obstruction       Review of patient's allergies indicates:  No Known Allergies    No current facility-administered medications on file prior to encounter.      Current Outpatient Medications on File Prior to Encounter   Medication Sig Dispense Refill    docusate sodium (COLACE) 100 MG capsule Take 1 capsule (100 mg total) by mouth 2 (two) times daily. 60 capsule 0    losartan (COZAAR) 100 MG tablet Take 1 tablet (100 mg total) by mouth once daily. 30 tablet 0    acetaminophen (TYLENOL) 325 MG tablet Take 2 tablets (650 mg total) by mouth every 6 (six) hours as needed. 13 tablet 0    apixaban (ELIQUIS) 2.5 mg Tab Take 2.5 mg by mouth 2 (two) times daily.      DULoxetine (CYMBALTA) 60 MG capsule Take 1 capsule (60 mg total) by mouth once daily. 30 capsule 0    losartan-hydrochlorothiazide 100-25 mg (HYZAAR) 100-25 mg per tablet Take 1 tablet by mouth once daily.      traZODone (DESYREL) 50 MG tablet Take 1 tablet (50 mg total) by mouth every evening. 30 tablet 0       Past Surgical History:   Procedure Laterality Date     SECTION, LOW TRANSVERSE  2010    TUBAL LIGATION                 CBC:   Recent Labs     20  1205 20  1849   WBC 6.68 6.37   RBC 3.56* 3.83*   HGB 9.1* 9.9*   HCT 29.8* 32.6*    257   MCV 84 85   MCH 25.6* 25.8*   MCHC 30.5* 30.4*       CMP:   Recent Labs     20  1205 20  1849    138   K 3.6 3.9    105   CO2 26 23   BUN 7 8   CREATININE  0.8 0.8   GLU 95 97   CALCIUM 8.8 9.3   ALBUMIN 3.3* 3.5   PROT 7.1 7.9   ALKPHOS 59 64   ALT 8* 7*   AST 11 12   BILITOT 0.3 0.4         Anesthesia Evaluation    I have reviewed the Patient Summary Reports.     I have reviewed the Medications.     Review of Systems  Anesthesia Hx:  No problems with previous Anesthesia    Social:  Former Smoker    Hematology/Oncology:         -- Anemia:   Cardiovascular:   Exercise tolerance: poor Denies Pacemaker. Hypertension  Denies Valvular problems/Murmurs.  Denies MI.  Denies CAD.    Denies CABG/stent.  Denies Dysrhythmias.   Denies Angina.             denies PVD CVT Deep Venous Thrombosis (DVT), Hx of DVT    Hepatic/GI:   Abdominal pain   Musculoskeletal:   Arthritis     Neurological:   Denies CVA. Denies Seizures.    Endocrine:  Metabolic Disorders, Morbid Obesity / BMI > 40  Psych:   Psychiatric History depression          Physical Exam  General:  Morbid Obesity    Airway/Jaw/Neck:  AIRWAY FINDINGS: Normal      Chest/Lungs:  Chest/Lungs Clear    Heart/Vascular:  Heart Findings: Normal       Mental Status:  Mental Status Findings: Normal        Anesthesia Plan  Type of Anesthesia, risks & benefits discussed:  Anesthesia Type:  general  Patient's Preference:   Intra-op Monitoring Plan: standard ASA monitors  Intra-op Monitoring Plan Comments:   Post Op Pain Control Plan:   Post Op Pain Control Plan Comments:   Induction:   IV  Beta Blocker:  Patient is not currently on a Beta-Blocker (No further documentation required).       Informed Consent: Patient understands risks and agrees with Anesthesia plan.  Questions answered. Anesthesia consent signed with patient.  ASA Score: 3     Day of Surgery Review of History & Physical:  There are no significant changes.  H&P update referred to the provider.         Ready For Surgery From Anesthesia Perspective.

## 2020-02-22 ENCOUNTER — ANESTHESIA EVENT (OUTPATIENT)
Dept: SURGERY | Facility: HOSPITAL | Age: 40
DRG: 330 | End: 2020-02-22
Payer: MEDICAID

## 2020-02-22 LAB
ABO + RH BLD: NORMAL
ALBUMIN SERPL BCP-MCNC: 3.1 G/DL (ref 3.5–5.2)
ALP SERPL-CCNC: 52 U/L (ref 55–135)
ALT SERPL W/O P-5'-P-CCNC: 8 U/L (ref 10–44)
ANION GAP SERPL CALC-SCNC: 8 MMOL/L (ref 8–16)
AST SERPL-CCNC: 11 U/L (ref 10–40)
BACTERIA UR CULT: NORMAL
BASOPHILS # BLD AUTO: 0.01 K/UL (ref 0–0.2)
BASOPHILS NFR BLD: 0.2 % (ref 0–1.9)
BILIRUB SERPL-MCNC: 0.3 MG/DL (ref 0.1–1)
BLD GP AB SCN CELLS X3 SERPL QL: NORMAL
BUN SERPL-MCNC: 9 MG/DL (ref 6–20)
CALCIUM SERPL-MCNC: 8.8 MG/DL (ref 8.7–10.5)
CHLORIDE SERPL-SCNC: 105 MMOL/L (ref 95–110)
CO2 SERPL-SCNC: 24 MMOL/L (ref 23–29)
CREAT SERPL-MCNC: 0.8 MG/DL (ref 0.5–1.4)
DIFFERENTIAL METHOD: ABNORMAL
EOSINOPHIL # BLD AUTO: 0 K/UL (ref 0–0.5)
EOSINOPHIL NFR BLD: 0.3 % (ref 0–8)
ERYTHROCYTE [DISTWIDTH] IN BLOOD BY AUTOMATED COUNT: 16.3 % (ref 11.5–14.5)
EST. GFR  (AFRICAN AMERICAN): >60 ML/MIN/1.73 M^2
EST. GFR  (NON AFRICAN AMERICAN): >60 ML/MIN/1.73 M^2
GLUCOSE SERPL-MCNC: 117 MG/DL (ref 70–110)
HCT VFR BLD AUTO: 31.5 % (ref 37–48.5)
HGB BLD-MCNC: 9.4 G/DL (ref 12–16)
IMM GRANULOCYTES # BLD AUTO: 0.02 K/UL (ref 0–0.04)
IMM GRANULOCYTES NFR BLD AUTO: 0.3 % (ref 0–0.5)
LYMPHOCYTES # BLD AUTO: 1.1 K/UL (ref 1–4.8)
LYMPHOCYTES NFR BLD: 18 % (ref 18–48)
MCH RBC QN AUTO: 25.6 PG (ref 27–31)
MCHC RBC AUTO-ENTMCNC: 29.8 G/DL (ref 32–36)
MCV RBC AUTO: 86 FL (ref 82–98)
MONOCYTES # BLD AUTO: 1 K/UL (ref 0.3–1)
MONOCYTES NFR BLD: 17.4 % (ref 4–15)
NEUTROPHILS # BLD AUTO: 3.7 K/UL (ref 1.8–7.7)
NEUTROPHILS NFR BLD: 63.8 % (ref 38–73)
NRBC BLD-RTO: 0 /100 WBC
PLATELET # BLD AUTO: 242 K/UL (ref 150–350)
PMV BLD AUTO: 10 FL (ref 9.2–12.9)
POTASSIUM SERPL-SCNC: 3.9 MMOL/L (ref 3.5–5.1)
PROT SERPL-MCNC: 7.2 G/DL (ref 6–8.4)
RBC # BLD AUTO: 3.67 M/UL (ref 4–5.4)
SODIUM SERPL-SCNC: 137 MMOL/L (ref 136–145)
WBC # BLD AUTO: 5.82 K/UL (ref 3.9–12.7)

## 2020-02-22 PROCEDURE — 11000001 HC ACUTE MED/SURG PRIVATE ROOM

## 2020-02-22 PROCEDURE — 63600175 PHARM REV CODE 636 W HCPCS

## 2020-02-22 PROCEDURE — 86920 COMPATIBILITY TEST SPIN: CPT

## 2020-02-22 PROCEDURE — 85025 COMPLETE CBC W/AUTO DIFF WBC: CPT

## 2020-02-22 PROCEDURE — 63600175 PHARM REV CODE 636 W HCPCS: Performed by: EMERGENCY MEDICINE

## 2020-02-22 PROCEDURE — 80053 COMPREHEN METABOLIC PANEL: CPT

## 2020-02-22 PROCEDURE — 63600175 PHARM REV CODE 636 W HCPCS: Performed by: STUDENT IN AN ORGANIZED HEALTH CARE EDUCATION/TRAINING PROGRAM

## 2020-02-22 PROCEDURE — 86901 BLOOD TYPING SEROLOGIC RH(D): CPT

## 2020-02-22 PROCEDURE — 36415 COLL VENOUS BLD VENIPUNCTURE: CPT

## 2020-02-22 PROCEDURE — C9113 INJ PANTOPRAZOLE SODIUM, VIA: HCPCS | Performed by: STUDENT IN AN ORGANIZED HEALTH CARE EDUCATION/TRAINING PROGRAM

## 2020-02-22 RX ORDER — DIPHENHYDRAMINE HYDROCHLORIDE 50 MG/ML
25 INJECTION INTRAMUSCULAR; INTRAVENOUS EVERY 6 HOURS PRN
Status: DISCONTINUED | OUTPATIENT
Start: 2020-02-22 | End: 2020-02-26

## 2020-02-22 RX ORDER — HYDROCODONE BITARTRATE AND ACETAMINOPHEN 500; 5 MG/1; MG/1
TABLET ORAL
Status: DISCONTINUED | OUTPATIENT
Start: 2020-02-22 | End: 2020-03-02 | Stop reason: HOSPADM

## 2020-02-22 RX ORDER — PANTOPRAZOLE SODIUM 40 MG/10ML
40 INJECTION, POWDER, LYOPHILIZED, FOR SOLUTION INTRAVENOUS DAILY
Status: DISCONTINUED | OUTPATIENT
Start: 2020-02-22 | End: 2020-03-02 | Stop reason: HOSPADM

## 2020-02-22 RX ORDER — HYDROMORPHONE HYDROCHLORIDE 2 MG/ML
1 INJECTION, SOLUTION INTRAMUSCULAR; INTRAVENOUS; SUBCUTANEOUS
Status: DISCONTINUED | OUTPATIENT
Start: 2020-02-22 | End: 2020-02-26

## 2020-02-22 RX ADMIN — MORPHINE SULFATE 4 MG: 10 INJECTION INTRAVENOUS at 08:02

## 2020-02-22 RX ADMIN — HYDROMORPHONE HYDROCHLORIDE 0.5 MG: 2 INJECTION, SOLUTION INTRAMUSCULAR; INTRAVENOUS; SUBCUTANEOUS at 03:02

## 2020-02-22 RX ADMIN — HYDROMORPHONE HYDROCHLORIDE 0.5 MG: 2 INJECTION, SOLUTION INTRAMUSCULAR; INTRAVENOUS; SUBCUTANEOUS at 10:02

## 2020-02-22 RX ADMIN — HYDROMORPHONE HYDROCHLORIDE 1 MG: 2 INJECTION, SOLUTION INTRAMUSCULAR; INTRAVENOUS; SUBCUTANEOUS at 12:02

## 2020-02-22 RX ADMIN — HYDROMORPHONE HYDROCHLORIDE 1 MG: 2 INJECTION, SOLUTION INTRAMUSCULAR; INTRAVENOUS; SUBCUTANEOUS at 07:02

## 2020-02-22 RX ADMIN — MORPHINE SULFATE 4 MG: 10 INJECTION INTRAVENOUS at 12:02

## 2020-02-22 RX ADMIN — ENOXAPARIN SODIUM 40 MG: 100 INJECTION SUBCUTANEOUS at 05:02

## 2020-02-22 RX ADMIN — HYDROMORPHONE HYDROCHLORIDE 1 MG: 2 INJECTION, SOLUTION INTRAMUSCULAR; INTRAVENOUS; SUBCUTANEOUS at 03:02

## 2020-02-22 RX ADMIN — HYDROMORPHONE HYDROCHLORIDE 1 MG: 2 INJECTION, SOLUTION INTRAMUSCULAR; INTRAVENOUS; SUBCUTANEOUS at 11:02

## 2020-02-22 RX ADMIN — PANTOPRAZOLE SODIUM 40 MG: 40 INJECTION, POWDER, FOR SOLUTION INTRAVENOUS at 12:02

## 2020-02-22 RX ADMIN — DIPHENHYDRAMINE HYDROCHLORIDE 25 MG: 50 INJECTION INTRAMUSCULAR; INTRAVENOUS at 05:02

## 2020-02-22 NOTE — NURSING
NGT not in correct placmeent per KUB, new orders per Dr. Liang to remove and place new NGT. New NGT placed, pt tolerated well, KUB ordered, pending placement.     New orders per Dr. Liang to adjust pain medication to 1mg dilaudid q2 hr PRN. Pt in no distress, will cont to monitor.

## 2020-02-22 NOTE — NURSING
Patient is A&Ox4 with peripheral IV in L arm, and makes needs known. Patient remained free from falls during this shift. Patient has been getting IV fluids, and has an NG tube placed in the right nostril connected to low intermittent suction. Patient is NPO, and is able to ambulate to the bathroom with standby assistance. Patient is on room air. VSS. Patient given PRN pain medication, and PRN hydralazine as needed and requested. No acute distress noted at this time. Bed in low position, call bell within reach.

## 2020-02-22 NOTE — PROGRESS NOTES
S:  No acute events.  Still complains of pain and nausea.  Denies fevers/chills/SOB.      O:  Afebrile.  No new labs.   Abdomen obese, no guarding/rebound  NGT in place, minimal output  Normal WOB, on room air    A/P:  38yo F with obstructing colon lesion now s/p colonoscopy with biopsy.  Will need surgical intervention this admission.     -continue NGT decompression today  -will do laparoscopic colectomy tomorrow  -lovenox, protonix  -get kub to check bowel gas pattern    Joseph Liang MD  [facility] Surgery Resident  C: [phone]

## 2020-02-22 NOTE — PLAN OF CARE
Problem: Adult Inpatient Plan of Care  Goal: Plan of Care Review  Outcome: Ongoing, Progressing   Pt free from falls, injury or any further trauma throughout shift. Pt AAO x 4. Continued medications as ordered. Complaints of pain, uncontrolled with medications.  NGT with clear drainage noted. Sx planned for 2/23. Pt in no distress. Will cont to monitor.

## 2020-02-23 ENCOUNTER — ANESTHESIA (OUTPATIENT)
Dept: SURGERY | Facility: HOSPITAL | Age: 40
DRG: 330 | End: 2020-02-23
Payer: MEDICAID

## 2020-02-23 PROBLEM — R10.9 ABDOMINAL PAIN: Status: ACTIVE | Noted: 2020-02-23

## 2020-02-23 LAB
ALBUMIN SERPL BCP-MCNC: 2.6 G/DL (ref 3.5–5.2)
ALBUMIN SERPL BCP-MCNC: 2.8 G/DL (ref 3.5–5.2)
ALP SERPL-CCNC: 51 U/L (ref 55–135)
ALP SERPL-CCNC: 57 U/L (ref 55–135)
ALT SERPL W/O P-5'-P-CCNC: 17 U/L (ref 10–44)
ALT SERPL W/O P-5'-P-CCNC: 8 U/L (ref 10–44)
ANION GAP SERPL CALC-SCNC: 10 MMOL/L (ref 8–16)
ANION GAP SERPL CALC-SCNC: 7 MMOL/L (ref 8–16)
AST SERPL-CCNC: 17 U/L (ref 10–40)
AST SERPL-CCNC: 9 U/L (ref 10–40)
BASOPHILS # BLD AUTO: 0.01 K/UL (ref 0–0.2)
BASOPHILS NFR BLD: 0.2 % (ref 0–1.9)
BILIRUB SERPL-MCNC: 0.3 MG/DL (ref 0.1–1)
BILIRUB SERPL-MCNC: 0.4 MG/DL (ref 0.1–1)
BLD PROD TYP BPU: NORMAL
BLD PROD TYP BPU: NORMAL
BLOOD UNIT EXPIRATION DATE: NORMAL
BLOOD UNIT EXPIRATION DATE: NORMAL
BLOOD UNIT TYPE CODE: 7300
BLOOD UNIT TYPE CODE: 7300
BLOOD UNIT TYPE: NORMAL
BLOOD UNIT TYPE: NORMAL
BUN SERPL-MCNC: 10 MG/DL (ref 6–20)
BUN SERPL-MCNC: 12 MG/DL (ref 6–20)
CALCIUM SERPL-MCNC: 8.6 MG/DL (ref 8.7–10.5)
CALCIUM SERPL-MCNC: 8.6 MG/DL (ref 8.7–10.5)
CHLORIDE SERPL-SCNC: 105 MMOL/L (ref 95–110)
CHLORIDE SERPL-SCNC: 106 MMOL/L (ref 95–110)
CO2 SERPL-SCNC: 22 MMOL/L (ref 23–29)
CO2 SERPL-SCNC: 26 MMOL/L (ref 23–29)
CODING SYSTEM: NORMAL
CODING SYSTEM: NORMAL
CREAT SERPL-MCNC: 0.8 MG/DL (ref 0.5–1.4)
CREAT SERPL-MCNC: 1 MG/DL (ref 0.5–1.4)
DIFFERENTIAL METHOD: ABNORMAL
DISPENSE STATUS: NORMAL
DISPENSE STATUS: NORMAL
EOSINOPHIL # BLD AUTO: 0 K/UL (ref 0–0.5)
EOSINOPHIL NFR BLD: 0.5 % (ref 0–8)
ERYTHROCYTE [DISTWIDTH] IN BLOOD BY AUTOMATED COUNT: 16.2 % (ref 11.5–14.5)
EST. GFR  (AFRICAN AMERICAN): >60 ML/MIN/1.73 M^2
EST. GFR  (AFRICAN AMERICAN): >60 ML/MIN/1.73 M^2
EST. GFR  (NON AFRICAN AMERICAN): >60 ML/MIN/1.73 M^2
EST. GFR  (NON AFRICAN AMERICAN): >60 ML/MIN/1.73 M^2
GLUCOSE SERPL-MCNC: 105 MG/DL (ref 70–110)
GLUCOSE SERPL-MCNC: 115 MG/DL (ref 70–110)
HCT VFR BLD AUTO: 30.3 % (ref 37–48.5)
HGB BLD-MCNC: 8.9 G/DL (ref 12–16)
IMM GRANULOCYTES # BLD AUTO: 0.01 K/UL (ref 0–0.04)
IMM GRANULOCYTES NFR BLD AUTO: 0.2 % (ref 0–0.5)
LACTATE SERPL-SCNC: 0.9 MMOL/L (ref 0.5–2.2)
LYMPHOCYTES # BLD AUTO: 1.2 K/UL (ref 1–4.8)
LYMPHOCYTES NFR BLD: 20.1 % (ref 18–48)
MAGNESIUM SERPL-MCNC: 1.7 MG/DL (ref 1.6–2.6)
MCH RBC QN AUTO: 25.4 PG (ref 27–31)
MCHC RBC AUTO-ENTMCNC: 29.4 G/DL (ref 32–36)
MCV RBC AUTO: 87 FL (ref 82–98)
MONOCYTES # BLD AUTO: 1 K/UL (ref 0.3–1)
MONOCYTES NFR BLD: 16.9 % (ref 4–15)
NEUTROPHILS # BLD AUTO: 3.8 K/UL (ref 1.8–7.7)
NEUTROPHILS NFR BLD: 62.1 % (ref 38–73)
NRBC BLD-RTO: 0 /100 WBC
NUM UNITS TRANS PACKED RBC: NORMAL
NUM UNITS TRANS PACKED RBC: NORMAL
PHOSPHATE SERPL-MCNC: 4.6 MG/DL (ref 2.7–4.5)
PLATELET # BLD AUTO: 228 K/UL (ref 150–350)
PMV BLD AUTO: 10.2 FL (ref 9.2–12.9)
POTASSIUM SERPL-SCNC: 3.8 MMOL/L (ref 3.5–5.1)
POTASSIUM SERPL-SCNC: 4 MMOL/L (ref 3.5–5.1)
PROT SERPL-MCNC: 6.6 G/DL (ref 6–8.4)
PROT SERPL-MCNC: 6.7 G/DL (ref 6–8.4)
RBC # BLD AUTO: 3.5 M/UL (ref 4–5.4)
SODIUM SERPL-SCNC: 138 MMOL/L (ref 136–145)
SODIUM SERPL-SCNC: 138 MMOL/L (ref 136–145)
WBC # BLD AUTO: 6.16 K/UL (ref 3.9–12.7)

## 2020-02-23 PROCEDURE — V2790 AMNIOTIC MEMBRANE: HCPCS | Performed by: SURGERY

## 2020-02-23 PROCEDURE — 85025 COMPLETE CBC W/AUTO DIFF WBC: CPT

## 2020-02-23 PROCEDURE — 25000003 PHARM REV CODE 250: Performed by: SURGERY

## 2020-02-23 PROCEDURE — 37000009 HC ANESTHESIA EA ADD 15 MINS: Performed by: SURGERY

## 2020-02-23 PROCEDURE — 83735 ASSAY OF MAGNESIUM: CPT

## 2020-02-23 PROCEDURE — 36000711: Performed by: SURGERY

## 2020-02-23 PROCEDURE — 25000003 PHARM REV CODE 250: Performed by: NURSE ANESTHETIST, CERTIFIED REGISTERED

## 2020-02-23 PROCEDURE — 63600175 PHARM REV CODE 636 W HCPCS: Performed by: SURGERY

## 2020-02-23 PROCEDURE — D9220A PRA ANESTHESIA: Mod: CRNA,,, | Performed by: NURSE ANESTHETIST, CERTIFIED REGISTERED

## 2020-02-23 PROCEDURE — 88307 TISSUE EXAM BY PATHOLOGIST: CPT | Mod: 26,,, | Performed by: PATHOLOGY

## 2020-02-23 PROCEDURE — C9290 INJ, BUPIVACAINE LIPOSOME: HCPCS | Performed by: SURGERY

## 2020-02-23 PROCEDURE — 37000008 HC ANESTHESIA 1ST 15 MINUTES: Performed by: SURGERY

## 2020-02-23 PROCEDURE — 27201423 OPTIME MED/SURG SUP & DEVICES STERILE SUPPLY: Performed by: SURGERY

## 2020-02-23 PROCEDURE — 80053 COMPREHEN METABOLIC PANEL: CPT | Mod: 91

## 2020-02-23 PROCEDURE — 36415 COLL VENOUS BLD VENIPUNCTURE: CPT

## 2020-02-23 PROCEDURE — 63600175 PHARM REV CODE 636 W HCPCS: Performed by: NURSE ANESTHETIST, CERTIFIED REGISTERED

## 2020-02-23 PROCEDURE — S0020 INJECTION, BUPIVICAINE HYDRO: HCPCS | Performed by: SURGERY

## 2020-02-23 PROCEDURE — 84100 ASSAY OF PHOSPHORUS: CPT

## 2020-02-23 PROCEDURE — D9220A PRA ANESTHESIA: ICD-10-PCS | Mod: ANES,,, | Performed by: ANESTHESIOLOGY

## 2020-02-23 PROCEDURE — 80053 COMPREHEN METABOLIC PANEL: CPT

## 2020-02-23 PROCEDURE — D9220A PRA ANESTHESIA: ICD-10-PCS | Mod: CRNA,,, | Performed by: NURSE ANESTHETIST, CERTIFIED REGISTERED

## 2020-02-23 PROCEDURE — 94761 N-INVAS EAR/PLS OXIMETRY MLT: CPT

## 2020-02-23 PROCEDURE — 11000001 HC ACUTE MED/SURG PRIVATE ROOM

## 2020-02-23 PROCEDURE — 71000033 HC RECOVERY, INTIAL HOUR: Performed by: SURGERY

## 2020-02-23 PROCEDURE — 63600175 PHARM REV CODE 636 W HCPCS: Performed by: STUDENT IN AN ORGANIZED HEALTH CARE EDUCATION/TRAINING PROGRAM

## 2020-02-23 PROCEDURE — D9220A PRA ANESTHESIA: Mod: ANES,,, | Performed by: ANESTHESIOLOGY

## 2020-02-23 PROCEDURE — 44213 PR LAP, SURG MOBIL SPLENIC FL DUR PTL COLECTOMY: ICD-10-PCS | Mod: ,,, | Performed by: SURGERY

## 2020-02-23 PROCEDURE — 83605 ASSAY OF LACTIC ACID: CPT

## 2020-02-23 PROCEDURE — 44204 PR LAP,SURG,COLECTOMY, PARTIAL, W/ANAST: ICD-10-PCS | Mod: ,,, | Performed by: SURGERY

## 2020-02-23 PROCEDURE — 94799 UNLISTED PULMONARY SVC/PX: CPT

## 2020-02-23 PROCEDURE — 36000710: Performed by: SURGERY

## 2020-02-23 PROCEDURE — 88307 TISSUE EXAM BY PATHOLOGIST: CPT | Performed by: PATHOLOGY

## 2020-02-23 PROCEDURE — 44204 LAPARO PARTIAL COLECTOMY: CPT | Mod: ,,, | Performed by: SURGERY

## 2020-02-23 PROCEDURE — 44213 LAP MOBIL SPLENIC FL ADD-ON: CPT | Mod: ,,, | Performed by: SURGERY

## 2020-02-23 PROCEDURE — 88307 PR  SURG PATH,LEVEL V: ICD-10-PCS | Mod: 26,,, | Performed by: PATHOLOGY

## 2020-02-23 DEVICE — MEMBRANE AMNIOFIX 4X4CM: Type: IMPLANTABLE DEVICE | Site: ABDOMEN | Status: FUNCTIONAL

## 2020-02-23 RX ORDER — MUPIROCIN 20 MG/G
1 OINTMENT TOPICAL 2 TIMES DAILY
Status: COMPLETED | OUTPATIENT
Start: 2020-02-23 | End: 2020-02-27

## 2020-02-23 RX ORDER — DEXAMETHASONE SODIUM PHOSPHATE 4 MG/ML
INJECTION, SOLUTION INTRA-ARTICULAR; INTRALESIONAL; INTRAMUSCULAR; INTRAVENOUS; SOFT TISSUE
Status: DISCONTINUED | OUTPATIENT
Start: 2020-02-23 | End: 2020-02-23

## 2020-02-23 RX ORDER — ALVIMOPAN 12 MG/1
12 CAPSULE ORAL 2 TIMES DAILY
Status: DISCONTINUED | OUTPATIENT
Start: 2020-02-23 | End: 2020-02-28

## 2020-02-23 RX ORDER — SODIUM CHLORIDE 0.9 % (FLUSH) 0.9 %
10 SYRINGE (ML) INJECTION
Status: DISCONTINUED | OUTPATIENT
Start: 2020-02-23 | End: 2020-03-02 | Stop reason: HOSPADM

## 2020-02-23 RX ORDER — ACETAMINOPHEN 10 MG/ML
1000 INJECTION, SOLUTION INTRAVENOUS ONCE
Status: COMPLETED | OUTPATIENT
Start: 2020-02-23 | End: 2020-02-23

## 2020-02-23 RX ORDER — MORPHINE SULFATE 10 MG/ML
5 INJECTION INTRAMUSCULAR; INTRAVENOUS; SUBCUTANEOUS EVERY 4 HOURS PRN
Status: DISCONTINUED | OUTPATIENT
Start: 2020-02-23 | End: 2020-02-26

## 2020-02-23 RX ORDER — MIDAZOLAM HYDROCHLORIDE 1 MG/ML
INJECTION, SOLUTION INTRAMUSCULAR; INTRAVENOUS
Status: DISCONTINUED | OUTPATIENT
Start: 2020-02-23 | End: 2020-02-23

## 2020-02-23 RX ORDER — HYDROMORPHONE HYDROCHLORIDE 2 MG/ML
0.2 INJECTION, SOLUTION INTRAMUSCULAR; INTRAVENOUS; SUBCUTANEOUS EVERY 5 MIN PRN
Status: DISCONTINUED | OUTPATIENT
Start: 2020-02-23 | End: 2020-02-26

## 2020-02-23 RX ORDER — SUCCINYLCHOLINE CHLORIDE 20 MG/ML
INJECTION INTRAMUSCULAR; INTRAVENOUS
Status: DISCONTINUED | OUTPATIENT
Start: 2020-02-23 | End: 2020-02-23

## 2020-02-23 RX ORDER — GLYCOPYRROLATE 0.2 MG/ML
INJECTION INTRAMUSCULAR; INTRAVENOUS
Status: DISCONTINUED | OUTPATIENT
Start: 2020-02-23 | End: 2020-02-23

## 2020-02-23 RX ORDER — ONDANSETRON 2 MG/ML
INJECTION INTRAMUSCULAR; INTRAVENOUS
Status: DISCONTINUED | OUTPATIENT
Start: 2020-02-23 | End: 2020-02-23

## 2020-02-23 RX ORDER — LIDOCAINE HYDROCHLORIDE 20 MG/ML
INJECTION INTRAVENOUS
Status: DISCONTINUED | OUTPATIENT
Start: 2020-02-23 | End: 2020-02-23

## 2020-02-23 RX ORDER — ROCURONIUM BROMIDE 10 MG/ML
INJECTION, SOLUTION INTRAVENOUS
Status: DISCONTINUED | OUTPATIENT
Start: 2020-02-23 | End: 2020-02-23

## 2020-02-23 RX ORDER — FENTANYL CITRATE 50 UG/ML
INJECTION, SOLUTION INTRAMUSCULAR; INTRAVENOUS
Status: DISCONTINUED | OUTPATIENT
Start: 2020-02-23 | End: 2020-02-23

## 2020-02-23 RX ORDER — BUPIVACAINE HYDROCHLORIDE 2.5 MG/ML
INJECTION, SOLUTION INFILTRATION; PERINEURAL
Status: DISCONTINUED | OUTPATIENT
Start: 2020-02-23 | End: 2020-02-23 | Stop reason: HOSPADM

## 2020-02-23 RX ORDER — NEOSTIGMINE METHYLSULFATE 1 MG/ML
INJECTION, SOLUTION INTRAVENOUS
Status: DISCONTINUED | OUTPATIENT
Start: 2020-02-23 | End: 2020-02-23

## 2020-02-23 RX ORDER — SODIUM CHLORIDE, SODIUM LACTATE, POTASSIUM CHLORIDE, CALCIUM CHLORIDE 600; 310; 30; 20 MG/100ML; MG/100ML; MG/100ML; MG/100ML
INJECTION, SOLUTION INTRAVENOUS CONTINUOUS PRN
Status: DISCONTINUED | OUTPATIENT
Start: 2020-02-23 | End: 2020-02-23

## 2020-02-23 RX ORDER — SODIUM CHLORIDE 9 MG/ML
INJECTION, SOLUTION INTRAVENOUS CONTINUOUS
Status: DISCONTINUED | OUTPATIENT
Start: 2020-02-23 | End: 2020-02-24

## 2020-02-23 RX ORDER — CEFOXITIN SODIUM 2 G/50ML
2 INJECTION, SOLUTION INTRAVENOUS ONCE
Status: COMPLETED | OUTPATIENT
Start: 2020-02-23 | End: 2020-02-23

## 2020-02-23 RX ORDER — PROPOFOL 10 MG/ML
VIAL (ML) INTRAVENOUS
Status: DISCONTINUED | OUTPATIENT
Start: 2020-02-23 | End: 2020-02-23

## 2020-02-23 RX ADMIN — FENTANYL CITRATE 50 MCG: 50 INJECTION INTRAMUSCULAR; INTRAVENOUS at 08:02

## 2020-02-23 RX ADMIN — HYDROMORPHONE HYDROCHLORIDE 1 MG: 2 INJECTION, SOLUTION INTRAMUSCULAR; INTRAVENOUS; SUBCUTANEOUS at 05:02

## 2020-02-23 RX ADMIN — HYDROMORPHONE HYDROCHLORIDE 1 MG: 2 INJECTION, SOLUTION INTRAMUSCULAR; INTRAVENOUS; SUBCUTANEOUS at 03:02

## 2020-02-23 RX ADMIN — DEXAMETHASONE SODIUM PHOSPHATE 4 MG: 4 INJECTION, SOLUTION INTRAMUSCULAR; INTRAVENOUS at 10:02

## 2020-02-23 RX ADMIN — CEFOXITIN SODIUM 2 G: 2 INJECTION, SOLUTION INTRAVENOUS at 09:02

## 2020-02-23 RX ADMIN — GLYCOPYRROLATE 0.2 MG: 0.2 INJECTION, SOLUTION INTRAMUSCULAR; INTRAVENOUS at 08:02

## 2020-02-23 RX ADMIN — ROCURONIUM BROMIDE 10 MG: 10 INJECTION, SOLUTION INTRAVENOUS at 09:02

## 2020-02-23 RX ADMIN — FENTANYL CITRATE 50 MCG: 50 INJECTION INTRAMUSCULAR; INTRAVENOUS at 10:02

## 2020-02-23 RX ADMIN — HYDROMORPHONE HYDROCHLORIDE 1 MG: 2 INJECTION, SOLUTION INTRAMUSCULAR; INTRAVENOUS; SUBCUTANEOUS at 09:02

## 2020-02-23 RX ADMIN — ALVIMOPAN 12 MG: 12 CAPSULE ORAL at 08:02

## 2020-02-23 RX ADMIN — HYDROMORPHONE HYDROCHLORIDE 1 MG: 2 INJECTION, SOLUTION INTRAMUSCULAR; INTRAVENOUS; SUBCUTANEOUS at 11:02

## 2020-02-23 RX ADMIN — ROCURONIUM BROMIDE 25 MG: 10 INJECTION, SOLUTION INTRAVENOUS at 09:02

## 2020-02-23 RX ADMIN — MIDAZOLAM HYDROCHLORIDE 2 MG: 1 INJECTION, SOLUTION INTRAMUSCULAR; INTRAVENOUS at 08:02

## 2020-02-23 RX ADMIN — HYDROMORPHONE HYDROCHLORIDE 1 MG: 2 INJECTION, SOLUTION INTRAMUSCULAR; INTRAVENOUS; SUBCUTANEOUS at 06:02

## 2020-02-23 RX ADMIN — SODIUM CHLORIDE, SODIUM LACTATE, POTASSIUM CHLORIDE, AND CALCIUM CHLORIDE: .6; .31; .03; .02 INJECTION, SOLUTION INTRAVENOUS at 10:02

## 2020-02-23 RX ADMIN — ROCURONIUM BROMIDE 5 MG: 10 INJECTION, SOLUTION INTRAVENOUS at 09:02

## 2020-02-23 RX ADMIN — TRAZODONE HYDROCHLORIDE 50 MG: 50 TABLET ORAL at 08:02

## 2020-02-23 RX ADMIN — ONDANSETRON 4 MG: 2 INJECTION, SOLUTION INTRAMUSCULAR; INTRAVENOUS at 08:02

## 2020-02-23 RX ADMIN — SUCCINYLCHOLINE CHLORIDE 160 MG: 20 INJECTION, SOLUTION INTRAMUSCULAR; INTRAVENOUS at 09:02

## 2020-02-23 RX ADMIN — PROPOFOL 160 MG: 10 INJECTION, EMULSION INTRAVENOUS at 09:02

## 2020-02-23 RX ADMIN — HYDRALAZINE HYDROCHLORIDE 10 MG: 20 INJECTION INTRAMUSCULAR; INTRAVENOUS at 05:02

## 2020-02-23 RX ADMIN — NEOSTIGMINE METHYLSULFATE 4 MG: 1 INJECTION INTRAVENOUS at 10:02

## 2020-02-23 RX ADMIN — MUPIROCIN 1 G: 20 OINTMENT TOPICAL at 08:02

## 2020-02-23 RX ADMIN — FENTANYL CITRATE 50 MCG: 50 INJECTION INTRAMUSCULAR; INTRAVENOUS at 09:02

## 2020-02-23 RX ADMIN — ROCURONIUM BROMIDE 10 MG: 10 INJECTION, SOLUTION INTRAVENOUS at 10:02

## 2020-02-23 RX ADMIN — GLYCOPYRROLATE 0.4 MG: 0.2 INJECTION, SOLUTION INTRAMUSCULAR; INTRAVENOUS at 10:02

## 2020-02-23 RX ADMIN — ENOXAPARIN SODIUM 40 MG: 100 INJECTION SUBCUTANEOUS at 04:02

## 2020-02-23 RX ADMIN — HYDROMORPHONE HYDROCHLORIDE 1 MG: 2 INJECTION, SOLUTION INTRAMUSCULAR; INTRAVENOUS; SUBCUTANEOUS at 07:02

## 2020-02-23 RX ADMIN — ACETAMINOPHEN 1000 MG: 10 INJECTION, SOLUTION INTRAVENOUS at 11:02

## 2020-02-23 RX ADMIN — ROCURONIUM BROMIDE 15 MG: 10 INJECTION, SOLUTION INTRAVENOUS at 09:02

## 2020-02-23 RX ADMIN — MUPIROCIN 1 G: 20 OINTMENT TOPICAL at 12:02

## 2020-02-23 RX ADMIN — SODIUM CHLORIDE: 0.9 INJECTION, SOLUTION INTRAVENOUS at 12:02

## 2020-02-23 RX ADMIN — HYDROMORPHONE HYDROCHLORIDE 1 MG: 2 INJECTION, SOLUTION INTRAMUSCULAR; INTRAVENOUS; SUBCUTANEOUS at 12:02

## 2020-02-23 RX ADMIN — HYDROMORPHONE HYDROCHLORIDE 1 MG: 2 INJECTION, SOLUTION INTRAMUSCULAR; INTRAVENOUS; SUBCUTANEOUS at 02:02

## 2020-02-23 RX ADMIN — SODIUM CHLORIDE, SODIUM LACTATE, POTASSIUM CHLORIDE, AND CALCIUM CHLORIDE 1000 ML: .6; .31; .03; .02 INJECTION, SOLUTION INTRAVENOUS at 04:02

## 2020-02-23 RX ADMIN — Medication 100 MG: at 09:02

## 2020-02-23 RX ADMIN — SODIUM CHLORIDE, SODIUM LACTATE, POTASSIUM CHLORIDE, AND CALCIUM CHLORIDE: .6; .31; .03; .02 INJECTION, SOLUTION INTRAVENOUS at 08:02

## 2020-02-23 RX ADMIN — HYDROMORPHONE HYDROCHLORIDE 1 MG: 2 INJECTION, SOLUTION INTRAMUSCULAR; INTRAVENOUS; SUBCUTANEOUS at 04:02

## 2020-02-23 NOTE — PROGRESS NOTES
Off unit to OR by bed. AO x4, no distress noted. Surgical bath done. IV flushed, intact and patent. Instructed to remove all undergarments, jewelry,  Hair clips and pins.

## 2020-02-23 NOTE — ANESTHESIA POSTPROCEDURE EVALUATION
Anesthesia Post Evaluation    Patient: Maryam Oneal    Procedure(s) Performed: Procedure(s) (LRB):  COLECTOMY, LAPAROSCOPIC, possible open (N/A)  COLECTOMY (N/A)    Final Anesthesia Type: general    Patient location during evaluation: PACU  Patient participation: Yes- Able to Participate  Level of consciousness: awake and alert, oriented and awake  Post-procedure vital signs: reviewed and stable  Pain management: adequate  Airway patency: patent    PONV status at discharge: No PONV  Anesthetic complications: no      Cardiovascular status: blood pressure returned to baseline, hemodynamically stable and stable  Respiratory status: unassisted and spontaneous ventilation  Hydration status: euvolemic  Follow-up not needed.          Vitals Value Taken Time   /75 2/23/2020 12:28 PM   Temp 36.6 °C (97.8 °F) 2/23/2020 12:28 PM   Pulse 89 2/23/2020 12:28 PM   Resp 20 2/23/2020 12:28 PM   SpO2 92 % 2/23/2020 12:28 PM         Event Time     Out of Recovery 02/23/2020 11:59:16          Pain/Jose R Score: Pain Rating Prior to Med Admin: 7 (2/23/2020 12:18 PM)  Pain Rating Post Med Admin: 5 (2/23/2020 12:48 PM)  Jose R Score: 9 (2/23/2020 11:57 AM)

## 2020-02-23 NOTE — ANESTHESIA PREPROCEDURE EVALUATION
02/23/2020  Maryam Oneal is a 39 y.o., female.    Anesthesia Evaluation    I have reviewed the Patient Summary Reports.    I have reviewed the Nursing Notes.      Review of Systems  Anesthesia Hx:  No problems with previous Anesthesia  Denies Family Hx of Anesthesia complications.   Denies Personal Hx of Anesthesia complications.   Social:  Former Smoker, No Alcohol Use    Hematology/Oncology:     Oncology Normal    -- Anemia: Hematology Comments: DVT left leg-was on Eliquis at home; med held during this admission  Us this visit neg for dvt    EENT/Dental:  EENT/Dental Normal NGT in place   Cardiovascular:   Hypertension Denies MI.   Denies CABG/stent.  Denies Dysrhythmias.   Denies Angina.        Pulmonary:  Pulmonary Normal    Renal/:  Renal/ Normal     Hepatic/GI:  Hepatic/GI Normal Bowel obstruction; colonic mass   Musculoskeletal:   Arthritis     Neurological:   Headaches    Endocrine:  Endocrine Normal Morbidly obese; BMI 56   Dermatological:  Skin Normal    Psych:   Psychiatric History depression          Physical Exam  General:  Morbid Obesity    Airway/Jaw/Neck:  Airway Findings: Mouth Opening: Normal Tongue: Large  Mallampati: III  TM Distance: Normal, at least 6 cm      Dental:  Dental Findings: (lateral iincisors in need of root canals...weak and brittle) In tact   Chest/Lungs:  Chest/Lungs Findings: Clear to auscultation, Normal Respiratory Rate     Heart/Vascular:  Heart Findings: Rate: Normal  Rhythm: Regular Rhythm        Mental Status:  Mental Status Findings:  Cooperative       Wt Readings from Last 3 Encounters:   02/21/20 (!) 208.5 kg (459 lb 10.6 oz)   02/18/20 (!) 199.6 kg (440 lb)   05/15/19 (!) 181.4 kg (400 lb)     Temp Readings from Last 3 Encounters:   02/23/20 37.1 °C (98.8 °F) (Oral)   02/18/20 36.7 °C (98 °F) (Oral)   05/21/19 36.6 °C (97.9 °F) (Oral)     BP Readings from  Last 3 Encounters:   02/23/20 129/61   02/18/20 (!) 187/82   05/21/19 123/79     Pulse Readings from Last 3 Encounters:   02/23/20 75   02/18/20 83   05/21/19 70     Lab Results   Component Value Date    WBC 5.82 02/22/2020    HGB 9.4 (L) 02/22/2020    HCT 31.5 (L) 02/22/2020    MCV 86 02/22/2020     02/22/2020       CMP  Sodium   Date Value Ref Range Status   02/22/2020 137 136 - 145 mmol/L Final     Potassium   Date Value Ref Range Status   02/22/2020 3.9 3.5 - 5.1 mmol/L Final     Chloride   Date Value Ref Range Status   02/22/2020 105 95 - 110 mmol/L Final     CO2   Date Value Ref Range Status   02/22/2020 24 23 - 29 mmol/L Final     Glucose   Date Value Ref Range Status   02/22/2020 117 (H) 70 - 110 mg/dL Final     BUN, Bld   Date Value Ref Range Status   02/22/2020 9 6 - 20 mg/dL Final     Creatinine   Date Value Ref Range Status   02/22/2020 0.8 0.5 - 1.4 mg/dL Final     Calcium   Date Value Ref Range Status   02/22/2020 8.8 8.7 - 10.5 mg/dL Final     Total Protein   Date Value Ref Range Status   02/22/2020 7.2 6.0 - 8.4 g/dL Final     Albumin   Date Value Ref Range Status   02/22/2020 3.1 (L) 3.5 - 5.2 g/dL Final     Total Bilirubin   Date Value Ref Range Status   02/22/2020 0.3 0.1 - 1.0 mg/dL Final     Comment:     For infants and newborns, interpretation of results should be based  on gestational age, weight and in agreement with clinical  observations.  Premature Infant recommended reference ranges:  Up to 24 hours.............<8.0 mg/dL  Up to 48 hours............<12.0 mg/dL  3-5 days..................<15.0 mg/dL  6-29 days.................<15.0 mg/dL       Alkaline Phosphatase   Date Value Ref Range Status   02/22/2020 52 (L) 55 - 135 U/L Final     AST   Date Value Ref Range Status   02/22/2020 11 10 - 40 U/L Final     ALT   Date Value Ref Range Status   02/22/2020 8 (L) 10 - 44 U/L Final     Anion Gap   Date Value Ref Range Status   02/22/2020 8 8 - 16 mmol/L Final     eGFR if     Date Value Ref Range Status   02/22/2020 >60 >60 mL/min/1.73 m^2 Final     eGFR if non    Date Value Ref Range Status   02/22/2020 >60 >60 mL/min/1.73 m^2 Final     Comment:     Calculation used to obtain the estimated glomerular filtration  rate (eGFR) is the CKD-EPI equation.        2/20/2020 UPT negative    Anesthesia Plan  Type of Anesthesia, risks & benefits discussed:  Anesthesia Type:  general  Patient's Preference:   Intra-op Monitoring Plan: standard ASA monitors  Intra-op Monitoring Plan Comments:   Post Op Pain Control Plan: multimodal analgesia and per primary service following discharge from PACU  Post Op Pain Control Plan Comments:   Induction:   IV  Beta Blocker:  Patient is not currently on a Beta-Blocker (No further documentation required).       Informed Consent: Patient understands risks and agrees with Anesthesia plan.  Questions answered. Anesthesia consent signed with patient.  ASA Score: 3     Day of Surgery Review of History & Physical: I have interviewed and examined the patient. I have reviewed the patient's H&P dated:        Anesthesia Plan Notes: Instructed on NPO after MN. D/w patient potential need for A-line, central line, also possibility of awake fiberoptic intubation and need for prolonged intubation post-op. Pt has order for 2u PRBC placed by surgery team.     Paper consent for anesthesia and blood transfusion placed in patient's chart.  Risk of tooth loss discussed        Ready For Surgery From Anesthesia Perspective.

## 2020-02-23 NOTE — TRANSFER OF CARE
"Anesthesia Transfer of Care Note    Patient: Maryam Oneal    Procedure(s) Performed: Procedure(s) (LRB):  COLECTOMY, LAPAROSCOPIC, possible open (N/A)  COLECTOMY (N/A)    Patient location: PACU    Anesthesia Type: general    Transport from OR: Transported from OR on room air with adequate spontaneous ventilation    Post pain: adequate analgesia    Post assessment: no apparent anesthetic complications and tolerated procedure well    Post vital signs: stable    Level of consciousness: awake and responds to stimulation    Nausea/Vomiting: no nausea/vomiting    Complications: none    Transfer of care protocol was followed      Last vitals:   Visit Vitals  BP (!) 173/81 (BP Location: Right arm, Patient Position: Lying)   Pulse 84   Temp 37.3 °C (99.1 °F) (Oral)   Resp 12   Ht 6' 4" (1.93 m)   Wt (!) 208.5 kg (459 lb 10.6 oz)   LMP 02/07/2020   SpO2 99%   Breastfeeding? No   BMI 55.95 kg/m²     "

## 2020-02-23 NOTE — OP NOTE
[facility]  General Surgery  Operative Note    SUMMARY     Date of Procedure: 2/23/2020     Procedure: Procedure(s) (LRB):  COLECTOMY, LAPAROSCOPIC, possible open (N/A)  COLECTOMY (N/A)       Surgeon(s) and Role:     * Sky Patel MD - Primary    Assisting Surgeon: Ta Liang MD    Pre-Operative Diagnosis: Large bowel obstruction [K56.609]    Post-Operative Diagnosis: Post-Op Diagnosis Codes:     * Large bowel obstruction [K56.609]    Anesthesia: General    Technical Procedures Used:  Patient was taken to the operating room placed on operating room table in lithotomy position prepped and draped around her abdomen usual sterile fashion. An incision was made at her umbilicus through which a 5 mm port was inserted under direct vision. The significant amount of gaseous distention in the patient's abdominal cavity after she was insufflated the us precluding the ability to safely perform laparoscopic resection.  The patient was then opened in the upper midline incision.  The left colon was mobilized from the sigmoid all the way up to the splenic flexure.  The attachments were divided using Harmonic scalpel all the way to the transverse colon.  She had a large redundant transverse colon and gaseous distension throughout.  The mesentery between the midtransverse colon and the sigmoid colon was divided using Harmonic scalpel.  The colon at this point was then divided proximally and distally in those areas and a side-to-side functional end-to-end anastomosis was then performed between sigmoid colon and the midtransverse colon.  Amniotic membrane was placed she has some Evicel injected into her pericolic gutter and all of her intestines were placed back into abdominal cavity and the wound was then closed in layers with absorbable suture. Dermabond tape was used she was awakened and transported to the recovery in satisfactory condition.    Description of the Findings of the Procedure:  Obstructing left  colon cancer no evidence of metastatic disease    Significant Surgical Tasks Conducted by the Assistant(s), if Applicable:  Rated 50%    Complications: No    Estimated Blood Loss (EBL):  Minimal           Implants: * No implants in log *    Specimens:   Specimen (12h ago, onward)    None                  Condition: Good    Disposition: PACU - hemodynamically stable.    Attestation: I was present and scrubbed for the entire procedure.

## 2020-02-23 NOTE — PLAN OF CARE
AO x4, no falls or distress noted. S/p colectomy with transverse incision with gauze and medipore tape, CDI. Encouraged ambulation but no activity this shift. Medicated for pain around the clock. Tolerating ice chips. NG tube to LIWS. VS stable, afebrile.    Problem: Adult Inpatient Plan of Care  Goal: Plan of Care Review  Outcome: Ongoing, Progressing  Goal: Patient-Specific Goal (Individualization)  Outcome: Ongoing, Progressing  Goal: Absence of Hospital-Acquired Illness or Injury  Outcome: Ongoing, Progressing  Goal: Optimal Comfort and Wellbeing  Outcome: Ongoing, Progressing  Goal: Readiness for Transition of Care  Outcome: Ongoing, Progressing  Goal: Rounds/Family Conference  Outcome: Ongoing, Progressing     Problem: Fall Injury Risk  Goal: Absence of Fall and Fall-Related Injury  Outcome: Ongoing, Progressing     Problem: Infection  Goal: Infection Symptom Resolution  Outcome: Ongoing, Progressing

## 2020-02-23 NOTE — PLAN OF CARE
Jose R score 9/10. Sleeps; responds to voice. Oriented x 4. Care explained. Midline abdominal incision island dressing cdi with abdominal binder in place. No n/v present. NG Tube intact. Rates pain 9/10 when awakened from sleep. See chart for full assessment. Hand off report to MAGGY Beckwith Medr.

## 2020-02-24 LAB
ALBUMIN SERPL BCP-MCNC: 2.4 G/DL (ref 3.5–5.2)
ALBUMIN SERPL BCP-MCNC: 2.4 G/DL (ref 3.5–5.2)
ALP SERPL-CCNC: 48 U/L (ref 55–135)
ALP SERPL-CCNC: 48 U/L (ref 55–135)
ALT SERPL W/O P-5'-P-CCNC: 16 U/L (ref 10–44)
ALT SERPL W/O P-5'-P-CCNC: 16 U/L (ref 10–44)
ANION GAP SERPL CALC-SCNC: 11 MMOL/L (ref 8–16)
AST SERPL-CCNC: 12 U/L (ref 10–40)
AST SERPL-CCNC: 12 U/L (ref 10–40)
BASOPHILS # BLD AUTO: 0.01 K/UL (ref 0–0.2)
BASOPHILS # BLD AUTO: ABNORMAL K/UL (ref 0–0.2)
BASOPHILS # BLD AUTO: ABNORMAL K/UL (ref 0–0.2)
BASOPHILS NFR BLD: 0 % (ref 0–1.9)
BASOPHILS NFR BLD: 0 % (ref 0–1.9)
BASOPHILS NFR BLD: 0.2 % (ref 0–1.9)
BILIRUB SERPL-MCNC: 0.5 MG/DL (ref 0.1–1)
BILIRUB SERPL-MCNC: 0.5 MG/DL (ref 0.1–1)
BUN SERPL-MCNC: 18 MG/DL (ref 6–20)
CALCIUM SERPL-MCNC: 8.4 MG/DL (ref 8.7–10.5)
CHLORIDE SERPL-SCNC: 106 MMOL/L (ref 95–110)
CO2 SERPL-SCNC: 22 MMOL/L (ref 23–29)
CREAT SERPL-MCNC: 1.7 MG/DL (ref 0.5–1.4)
DIFFERENTIAL METHOD: ABNORMAL
DOHLE BOD BLD QL SMEAR: PRESENT
DOHLE BOD BLD QL SMEAR: PRESENT
EOSINOPHIL # BLD AUTO: 0 K/UL (ref 0–0.5)
EOSINOPHIL # BLD AUTO: ABNORMAL K/UL (ref 0–0.5)
EOSINOPHIL # BLD AUTO: ABNORMAL K/UL (ref 0–0.5)
EOSINOPHIL NFR BLD: 0 % (ref 0–8)
EOSINOPHIL NFR BLD: 0 % (ref 0–8)
EOSINOPHIL NFR BLD: 0.2 % (ref 0–8)
ERYTHROCYTE [DISTWIDTH] IN BLOOD BY AUTOMATED COUNT: 16.2 % (ref 11.5–14.5)
ERYTHROCYTE [DISTWIDTH] IN BLOOD BY AUTOMATED COUNT: 16.4 % (ref 11.5–14.5)
ERYTHROCYTE [DISTWIDTH] IN BLOOD BY AUTOMATED COUNT: 16.4 % (ref 11.5–14.5)
EST. GFR  (AFRICAN AMERICAN): 43 ML/MIN/1.73 M^2
EST. GFR  (NON AFRICAN AMERICAN): 38 ML/MIN/1.73 M^2
GLUCOSE SERPL-MCNC: 109 MG/DL (ref 70–110)
HCT VFR BLD AUTO: 32.7 % (ref 37–48.5)
HCT VFR BLD AUTO: 32.7 % (ref 37–48.5)
HCT VFR BLD AUTO: 34.1 % (ref 37–48.5)
HGB BLD-MCNC: 10.6 G/DL (ref 12–16)
HGB BLD-MCNC: 9.9 G/DL (ref 12–16)
HGB BLD-MCNC: 9.9 G/DL (ref 12–16)
HYPOCHROMIA BLD QL SMEAR: ABNORMAL
HYPOCHROMIA BLD QL SMEAR: ABNORMAL
IMM GRANULOCYTES # BLD AUTO: 0.03 K/UL (ref 0–0.04)
IMM GRANULOCYTES # BLD AUTO: ABNORMAL K/UL (ref 0–0.04)
IMM GRANULOCYTES # BLD AUTO: ABNORMAL K/UL (ref 0–0.04)
IMM GRANULOCYTES NFR BLD AUTO: 0.5 % (ref 0–0.5)
IMM GRANULOCYTES NFR BLD AUTO: ABNORMAL % (ref 0–0.5)
IMM GRANULOCYTES NFR BLD AUTO: ABNORMAL % (ref 0–0.5)
LYMPHOCYTES # BLD AUTO: 0.6 K/UL (ref 1–4.8)
LYMPHOCYTES # BLD AUTO: ABNORMAL K/UL (ref 1–4.8)
LYMPHOCYTES # BLD AUTO: ABNORMAL K/UL (ref 1–4.8)
LYMPHOCYTES NFR BLD: 14 % (ref 18–48)
LYMPHOCYTES NFR BLD: 14 % (ref 18–48)
LYMPHOCYTES NFR BLD: 9.9 % (ref 18–48)
MAGNESIUM SERPL-MCNC: 1.7 MG/DL (ref 1.6–2.6)
MCH RBC QN AUTO: 25.6 PG (ref 27–31)
MCH RBC QN AUTO: 25.8 PG (ref 27–31)
MCH RBC QN AUTO: 25.8 PG (ref 27–31)
MCHC RBC AUTO-ENTMCNC: 30.3 G/DL (ref 32–36)
MCHC RBC AUTO-ENTMCNC: 30.3 G/DL (ref 32–36)
MCHC RBC AUTO-ENTMCNC: 31.1 G/DL (ref 32–36)
MCV RBC AUTO: 82 FL (ref 82–98)
MCV RBC AUTO: 85 FL (ref 82–98)
MCV RBC AUTO: 85 FL (ref 82–98)
METAMYELOCYTES NFR BLD MANUAL: 1 %
METAMYELOCYTES NFR BLD MANUAL: 1 %
MONOCYTES # BLD AUTO: 0.8 K/UL (ref 0.3–1)
MONOCYTES # BLD AUTO: ABNORMAL K/UL (ref 0.3–1)
MONOCYTES # BLD AUTO: ABNORMAL K/UL (ref 0.3–1)
MONOCYTES NFR BLD: 13.2 % (ref 4–15)
MONOCYTES NFR BLD: 17 % (ref 4–15)
MONOCYTES NFR BLD: 17 % (ref 4–15)
NEUTROPHILS # BLD AUTO: 4.8 K/UL (ref 1.8–7.7)
NEUTROPHILS NFR BLD: 56 % (ref 38–73)
NEUTROPHILS NFR BLD: 56 % (ref 38–73)
NEUTROPHILS NFR BLD: 76 % (ref 38–73)
NEUTS BAND NFR BLD MANUAL: 12 %
NEUTS BAND NFR BLD MANUAL: 12 %
NRBC BLD-RTO: 0 /100 WBC
PLATELET # BLD AUTO: 267 K/UL (ref 150–350)
PLATELET # BLD AUTO: 267 K/UL (ref 150–350)
PLATELET # BLD AUTO: ABNORMAL K/UL (ref 150–350)
PLATELET BLD QL SMEAR: ABNORMAL
PLATELET BLD QL SMEAR: ABNORMAL
PMV BLD AUTO: 10 FL (ref 9.2–12.9)
PMV BLD AUTO: 10 FL (ref 9.2–12.9)
PMV BLD AUTO: ABNORMAL FL (ref 9.2–12.9)
POTASSIUM SERPL-SCNC: 4 MMOL/L (ref 3.5–5.1)
PROT SERPL-MCNC: 6.4 G/DL (ref 6–8.4)
PROT SERPL-MCNC: 6.4 G/DL (ref 6–8.4)
RBC # BLD AUTO: 3.83 M/UL (ref 4–5.4)
RBC # BLD AUTO: 3.83 M/UL (ref 4–5.4)
RBC # BLD AUTO: 4.14 M/UL (ref 4–5.4)
SODIUM SERPL-SCNC: 139 MMOL/L (ref 136–145)
WBC # BLD AUTO: 5.78 K/UL (ref 3.9–12.7)
WBC # BLD AUTO: 5.78 K/UL (ref 3.9–12.7)
WBC # BLD AUTO: 6.35 K/UL (ref 3.9–12.7)

## 2020-02-24 PROCEDURE — 27000221 HC OXYGEN, UP TO 24 HOURS

## 2020-02-24 PROCEDURE — 25000003 PHARM REV CODE 250: Performed by: SURGERY

## 2020-02-24 PROCEDURE — C9113 INJ PANTOPRAZOLE SODIUM, VIA: HCPCS | Performed by: SURGERY

## 2020-02-24 PROCEDURE — 63600175 PHARM REV CODE 636 W HCPCS: Performed by: SURGERY

## 2020-02-24 PROCEDURE — 11000001 HC ACUTE MED/SURG PRIVATE ROOM

## 2020-02-24 PROCEDURE — 94799 UNLISTED PULMONARY SVC/PX: CPT

## 2020-02-24 PROCEDURE — 85007 BL SMEAR W/DIFF WBC COUNT: CPT

## 2020-02-24 PROCEDURE — 63600175 PHARM REV CODE 636 W HCPCS: Performed by: STUDENT IN AN ORGANIZED HEALTH CARE EDUCATION/TRAINING PROGRAM

## 2020-02-24 PROCEDURE — 85027 COMPLETE CBC AUTOMATED: CPT

## 2020-02-24 PROCEDURE — 83735 ASSAY OF MAGNESIUM: CPT

## 2020-02-24 PROCEDURE — 80053 COMPREHEN METABOLIC PANEL: CPT

## 2020-02-24 PROCEDURE — 36415 COLL VENOUS BLD VENIPUNCTURE: CPT

## 2020-02-24 PROCEDURE — 97116 GAIT TRAINING THERAPY: CPT

## 2020-02-24 PROCEDURE — 25000003 PHARM REV CODE 250: Performed by: STUDENT IN AN ORGANIZED HEALTH CARE EDUCATION/TRAINING PROGRAM

## 2020-02-24 PROCEDURE — 97161 PT EVAL LOW COMPLEX 20 MIN: CPT

## 2020-02-24 RX ORDER — ACETAMINOPHEN 325 MG/1
650 TABLET ORAL EVERY 4 HOURS PRN
Status: DISCONTINUED | OUTPATIENT
Start: 2020-02-24 | End: 2020-02-29

## 2020-02-24 RX ADMIN — HYDROMORPHONE HYDROCHLORIDE 1 MG: 2 INJECTION, SOLUTION INTRAMUSCULAR; INTRAVENOUS; SUBCUTANEOUS at 06:02

## 2020-02-24 RX ADMIN — LOSARTAN POTASSIUM 100 MG: 25 TABLET ORAL at 08:02

## 2020-02-24 RX ADMIN — HYDROMORPHONE HYDROCHLORIDE 1 MG: 2 INJECTION, SOLUTION INTRAMUSCULAR; INTRAVENOUS; SUBCUTANEOUS at 05:02

## 2020-02-24 RX ADMIN — ALVIMOPAN 12 MG: 12 CAPSULE ORAL at 09:02

## 2020-02-24 RX ADMIN — DULOXETINE HYDROCHLORIDE 60 MG: 30 CAPSULE, DELAYED RELEASE ORAL at 08:02

## 2020-02-24 RX ADMIN — ENOXAPARIN SODIUM 40 MG: 100 INJECTION SUBCUTANEOUS at 04:02

## 2020-02-24 RX ADMIN — ALVIMOPAN 12 MG: 12 CAPSULE ORAL at 08:02

## 2020-02-24 RX ADMIN — HYDROMORPHONE HYDROCHLORIDE 1 MG: 2 INJECTION, SOLUTION INTRAMUSCULAR; INTRAVENOUS; SUBCUTANEOUS at 02:02

## 2020-02-24 RX ADMIN — TRAZODONE HYDROCHLORIDE 50 MG: 50 TABLET ORAL at 09:02

## 2020-02-24 RX ADMIN — SODIUM CHLORIDE, SODIUM LACTATE, POTASSIUM CHLORIDE, AND CALCIUM CHLORIDE 1000 ML: .6; .31; .03; .02 INJECTION, SOLUTION INTRAVENOUS at 08:02

## 2020-02-24 RX ADMIN — MUPIROCIN 1 G: 20 OINTMENT TOPICAL at 08:02

## 2020-02-24 RX ADMIN — MUPIROCIN 1 G: 20 OINTMENT TOPICAL at 09:02

## 2020-02-24 RX ADMIN — PANTOPRAZOLE SODIUM 40 MG: 40 INJECTION, POWDER, FOR SOLUTION INTRAVENOUS at 08:02

## 2020-02-24 RX ADMIN — HYDROMORPHONE HYDROCHLORIDE 1 MG: 2 INJECTION, SOLUTION INTRAMUSCULAR; INTRAVENOUS; SUBCUTANEOUS at 11:02

## 2020-02-24 RX ADMIN — ACETAMINOPHEN 650 MG: 325 TABLET ORAL at 12:02

## 2020-02-24 RX ADMIN — HYDROMORPHONE HYDROCHLORIDE 1 MG: 2 INJECTION, SOLUTION INTRAMUSCULAR; INTRAVENOUS; SUBCUTANEOUS at 04:02

## 2020-02-24 NOTE — NURSING
Patient is A&Ox4 with peripheral IV in upper L arm, and makes needs known. Patient remained free from falls during this shift. Patient has been getting IV fluids, and is on O2 via nasal cannula at 2L per minute. Patient given PRN pain medication upon request and as needed for fever per MD order.  Patient has gotten up to the BSC with 1 person assist, and ambulated the halls with PT.  No acute distress noted at this time. Bed in low position, bed alarm on, call bell within reach. Family at the bedside.

## 2020-02-24 NOTE — PT/OT/SLP EVAL
"Physical Therapy Evaluation    Patient Name:  Maryam Oneal   MRN:  [id number]    Recommendations:     Discharge Recommendations:  home   Discharge Equipment Recommendations: none   Barriers to discharge: None    Assessment:     Maryam Oneal is a 39 y.o. female admitted with a medical diagnosis of Large bowel obstruction.  She presents with the following impairments/functional limitations:  weakness, impaired functional mobilty, edema, gait instability, pain . POD #1 partial colectomy. Postop tachycardia (HR 140s while amb in oneil today).   Pt is 6'5" tall and weighs 459#.     Will f/u day after hospital holiday to ensure no setbacks.  Educated pt on importance of amb in halls TID; asking staff to assist with lines prn.      Rehab Prognosis: Good; patient would benefit from acute skilled PT services to address these deficits and reach maximum level of function.    Recent Surgery: Procedure(s) (LRB):  COLECTOMY, LAPAROSCOPIC, attempted (N/A)  COLECTOMY (N/A) 1 Day Post-Op    Plan:     During this hospitalization, patient to be seen 3 x/week to address the identified rehab impairments via gait training and progress toward the following goals:    · Plan of Care Expires:  02/28/20    Subjective     Chief Complaint: abd pain  Patient/Family Comments/goals: not stated  Pain/Comfort:  · Pain Rating 1: 5/10  · Location - Orientation 1: lower  · Location 1: abdomen  · Pain Addressed 1: Pre-medicate for activity    Patients cultural, spiritual, Catholic conflicts given the current situation: no    Living Environment:  Home with 3 ARTURO; intends to stay with her mother upon d/c. Pt has 8 children. Not employed.   Prior to admission, patients level of function was ind with all. .  Equipment used at home: none.  DME owned (not currently used): none.  Upon discharge, patient will have assistance from n/a.    Objective:     Communicated with RN prior to session.  Patient found HOB elevated with oxygen, peripheral IV, SCD  upon PT " entry to room.    General Precautions: Standard,     Orthopedic Precautions:N/A   Braces: (abd binder; removed 2/2 pt c/o pain; d/w RN)     Exams:  · Cognitive Exam:  Patient is oriented to Person, Place, Time, Situation and soft spoken. pleasant  · Gross Motor Coordination:  WFL  · Postural Exam:  Patient presented with the following abnormalities:    · -       No postural abnormalities identified  · Sensation:    · -       Intact  · Skin Integrity/Edema:      · -       Skin integrity: abd sx incision dressed  · RLE ROM: WFL  · RLE Strength: WFL  · LLE ROM: WFL  · LLE Strength: WFL     HOB fully elevated prior to supine> sit   Once seated pt requested to use BSC for BM.   Upon return pt found seated EOB; agreed to walk, in NAD.   Placed on RA to ambulate. O2 sats 91% after ambulating; placed back on NC to 1L once assisted to supine.     Functional Mobility:  · Bed Mobility:     · Supine to Sit: minimum assistance  · Sit > sup c/ moderate A for legs.   · Transfers:     · Sit to Stand:  modified independence with no AD  · Gait: very slow, with SBA; mild occasional sway with self recovery; reaches for wall rails or countertops prn. provided pt with thanh w/c to push for balance support x 100 ft (walked x 300 ft total)  · Balance: good (-) standing unsuppoted      Therapeutic Activities and Exercises:   n/a    AM-PAC 6 CLICK MOBILITY  Total Score:24     Patient left HOB elevated with call button in reach and mother present.    GOALS:   Multidisciplinary Problems     Physical Therapy Goals        Problem: Physical Therapy Goal    Goal Priority Disciplines Outcome Goal Variances Interventions   Physical Therapy Goal     PT, PT/OT      Description:  Goals to be met by:      Patient will increase functional independence with mobility by performin. Gait  x 400 feet with Modified [place].                       History:     Past Medical History:   Diagnosis Date    Arthritis     DVT (deep venous thrombosis)      Hypertension        Past Surgical History:   Procedure Laterality Date     SECTION, LOW TRANSVERSE  2010    COLECTOMY N/A 2020    Procedure: COLECTOMY;  Surgeon: Sky Patel MD;  Location: [facility] OR;  Service: General;  Laterality: N/A;    FLEXIBLE SIGMOIDOSCOPY N/A 2020    Procedure: SIGMOIDOSCOPY, FLEXIBLE;  Surgeon: Ez Bartlett MD;  Location: [facility] ENDO;  Service: Endoscopy;  Laterality: N/A;    TUBAL LIGATION             Time Tracking:     PT Received On: 20  PT Start Time: 1348     PT Stop Time: 1505  PT Total Time (min): 77 min     Billable Minutes: Evaluation 15 and Gait Training 20      Kelsi Elizabeth, PT  2020

## 2020-02-24 NOTE — NURSING
NG tube removed by MD Dr Liang. Patient tolerated well.  Patient may have ice chips for now or small spoonfuls of water.

## 2020-02-24 NOTE — PLAN OF CARE
Problem: Adult Inpatient Plan of Care  Goal: Absence of Hospital-Acquired Illness or Injury  Outcome: Ongoing, Progressing    Safe to amb in halls. HR 140s while amb. Will f/u on Wednesday. No DME or HHPT needs.

## 2020-02-24 NOTE — NURSING
Chart check completed, abnormal VS noted, charge RN, Jorge contacted, no concerns verbalized at this time, instructed to call [phone] for further concerns or assistance. HR elevated, most likely pain related, RN is giving medication now.

## 2020-02-24 NOTE — PROGRESS NOTES
S:  No acute events.  Denies nausea/vomiting/SOB/CP.    O:  Tachycardia to 130s.  Low uop.  Hgb up from pre-op.    Abdomen obese, no guarding/rebound/dressing intact.      A/P:  POD1 from partial colectomy with anastomosis.  Tachycardia likely 2/2 volume depletion    -will bolus 1L LR  -Increase MIVF to 150mL/hr  -DC NGT  -OOB, PT/OT  -IS/Pulm toilet    Joseph Liang MD  [facility] Surgery Resident

## 2020-02-24 NOTE — PROGRESS NOTES
Dr Liang notified pt request for ice chips. He also asked about urine output from wiggins cath. About 200 of nish/concetrated urine noted. LR 1L bolus ordered.

## 2020-02-24 NOTE — NURSING
Jeff catheter removed at this time per MD orders. 450 mls of dark yellow removed from drainage bag.Patient tolerated well.

## 2020-02-25 LAB
ALBUMIN SERPL BCP-MCNC: 2.4 G/DL (ref 3.5–5.2)
ALP SERPL-CCNC: 57 U/L (ref 55–135)
ALT SERPL W/O P-5'-P-CCNC: 13 U/L (ref 10–44)
ANION GAP SERPL CALC-SCNC: 9 MMOL/L (ref 8–16)
AST SERPL-CCNC: 14 U/L (ref 10–40)
BASOPHILS # BLD AUTO: ABNORMAL K/UL (ref 0–0.2)
BASOPHILS NFR BLD: 0 % (ref 0–1.9)
BILIRUB SERPL-MCNC: 0.7 MG/DL (ref 0.1–1)
BUN SERPL-MCNC: 23 MG/DL (ref 6–20)
CALCIUM SERPL-MCNC: 8.7 MG/DL (ref 8.7–10.5)
CHLORIDE SERPL-SCNC: 104 MMOL/L (ref 95–110)
CO2 SERPL-SCNC: 24 MMOL/L (ref 23–29)
CREAT SERPL-MCNC: 1.5 MG/DL (ref 0.5–1.4)
DIFFERENTIAL METHOD: ABNORMAL
EOSINOPHIL # BLD AUTO: ABNORMAL K/UL (ref 0–0.5)
EOSINOPHIL NFR BLD: 0 % (ref 0–8)
ERYTHROCYTE [DISTWIDTH] IN BLOOD BY AUTOMATED COUNT: 16.3 % (ref 11.5–14.5)
EST. GFR  (AFRICAN AMERICAN): 51 ML/MIN/1.73 M^2
EST. GFR  (NON AFRICAN AMERICAN): 44 ML/MIN/1.73 M^2
GLUCOSE SERPL-MCNC: 109 MG/DL (ref 70–110)
HCT VFR BLD AUTO: 29.6 % (ref 37–48.5)
HGB BLD-MCNC: 9.1 G/DL (ref 12–16)
IMM GRANULOCYTES # BLD AUTO: ABNORMAL K/UL (ref 0–0.04)
IMM GRANULOCYTES NFR BLD AUTO: ABNORMAL % (ref 0–0.5)
LYMPHOCYTES # BLD AUTO: ABNORMAL K/UL (ref 1–4.8)
LYMPHOCYTES NFR BLD: 8 % (ref 18–48)
MCH RBC QN AUTO: 25.9 PG (ref 27–31)
MCHC RBC AUTO-ENTMCNC: 30.7 G/DL (ref 32–36)
MCV RBC AUTO: 84 FL (ref 82–98)
MONOCYTES # BLD AUTO: ABNORMAL K/UL (ref 0.3–1)
MONOCYTES NFR BLD: 10 % (ref 4–15)
NEUTROPHILS NFR BLD: 73 % (ref 38–73)
NEUTS BAND NFR BLD MANUAL: 9 %
NRBC BLD-RTO: 0 /100 WBC
PLATELET # BLD AUTO: 226 K/UL (ref 150–350)
PMV BLD AUTO: 10.3 FL (ref 9.2–12.9)
POTASSIUM SERPL-SCNC: 3.5 MMOL/L (ref 3.5–5.1)
PROT SERPL-MCNC: 6.6 G/DL (ref 6–8.4)
RBC # BLD AUTO: 3.52 M/UL (ref 4–5.4)
SODIUM SERPL-SCNC: 137 MMOL/L (ref 136–145)
WBC # BLD AUTO: 7.06 K/UL (ref 3.9–12.7)

## 2020-02-25 PROCEDURE — 63600175 PHARM REV CODE 636 W HCPCS

## 2020-02-25 PROCEDURE — 25000003 PHARM REV CODE 250: Performed by: SURGERY

## 2020-02-25 PROCEDURE — 97535 SELF CARE MNGMENT TRAINING: CPT

## 2020-02-25 PROCEDURE — 80053 COMPREHEN METABOLIC PANEL: CPT

## 2020-02-25 PROCEDURE — C9113 INJ PANTOPRAZOLE SODIUM, VIA: HCPCS | Performed by: SURGERY

## 2020-02-25 PROCEDURE — 36415 COLL VENOUS BLD VENIPUNCTURE: CPT

## 2020-02-25 PROCEDURE — 63600175 PHARM REV CODE 636 W HCPCS: Performed by: SURGERY

## 2020-02-25 PROCEDURE — 25000003 PHARM REV CODE 250: Performed by: STUDENT IN AN ORGANIZED HEALTH CARE EDUCATION/TRAINING PROGRAM

## 2020-02-25 PROCEDURE — 11000001 HC ACUTE MED/SURG PRIVATE ROOM

## 2020-02-25 PROCEDURE — 85007 BL SMEAR W/DIFF WBC COUNT: CPT

## 2020-02-25 PROCEDURE — 85027 COMPLETE CBC AUTOMATED: CPT

## 2020-02-25 PROCEDURE — 97165 OT EVAL LOW COMPLEX 30 MIN: CPT

## 2020-02-25 PROCEDURE — S5010 5% DEXTROSE AND 0.45% SALINE: HCPCS | Performed by: STUDENT IN AN ORGANIZED HEALTH CARE EDUCATION/TRAINING PROGRAM

## 2020-02-25 RX ORDER — HEPARIN SODIUM 5000 [USP'U]/ML
5000 INJECTION, SOLUTION INTRAVENOUS; SUBCUTANEOUS EVERY 8 HOURS
Status: DISCONTINUED | OUTPATIENT
Start: 2020-02-25 | End: 2020-02-29

## 2020-02-25 RX ADMIN — MUPIROCIN 1 G: 20 OINTMENT TOPICAL at 08:02

## 2020-02-25 RX ADMIN — ALVIMOPAN 12 MG: 12 CAPSULE ORAL at 08:02

## 2020-02-25 RX ADMIN — HYDROMORPHONE HYDROCHLORIDE 1 MG: 2 INJECTION, SOLUTION INTRAMUSCULAR; INTRAVENOUS; SUBCUTANEOUS at 01:02

## 2020-02-25 RX ADMIN — PANTOPRAZOLE SODIUM 40 MG: 40 INJECTION, POWDER, FOR SOLUTION INTRAVENOUS at 10:02

## 2020-02-25 RX ADMIN — DULOXETINE HYDROCHLORIDE 60 MG: 30 CAPSULE, DELAYED RELEASE ORAL at 10:02

## 2020-02-25 RX ADMIN — HYDROMORPHONE HYDROCHLORIDE 1 MG: 2 INJECTION, SOLUTION INTRAMUSCULAR; INTRAVENOUS; SUBCUTANEOUS at 12:02

## 2020-02-25 RX ADMIN — HYDROMORPHONE HYDROCHLORIDE 1 MG: 2 INJECTION, SOLUTION INTRAMUSCULAR; INTRAVENOUS; SUBCUTANEOUS at 04:02

## 2020-02-25 RX ADMIN — HEPARIN SODIUM 5000 UNITS: 5000 INJECTION, SOLUTION INTRAVENOUS; SUBCUTANEOUS at 09:02

## 2020-02-25 RX ADMIN — DEXTROSE AND SODIUM CHLORIDE: 5; .45 INJECTION, SOLUTION INTRAVENOUS at 10:02

## 2020-02-25 RX ADMIN — HYDROMORPHONE HYDROCHLORIDE 1 MG: 2 INJECTION, SOLUTION INTRAMUSCULAR; INTRAVENOUS; SUBCUTANEOUS at 06:02

## 2020-02-25 RX ADMIN — MUPIROCIN 1 G: 20 OINTMENT TOPICAL at 10:02

## 2020-02-25 RX ADMIN — LOSARTAN POTASSIUM 100 MG: 25 TABLET ORAL at 10:02

## 2020-02-25 RX ADMIN — ACETAMINOPHEN 650 MG: 325 TABLET ORAL at 12:02

## 2020-02-25 RX ADMIN — TRAZODONE HYDROCHLORIDE 50 MG: 50 TABLET ORAL at 08:02

## 2020-02-25 RX ADMIN — HYDROMORPHONE HYDROCHLORIDE 1 MG: 2 INJECTION, SOLUTION INTRAMUSCULAR; INTRAVENOUS; SUBCUTANEOUS at 10:02

## 2020-02-25 RX ADMIN — ALVIMOPAN 12 MG: 12 CAPSULE ORAL at 10:02

## 2020-02-25 RX ADMIN — HEPARIN SODIUM 5000 UNITS: 5000 INJECTION, SOLUTION INTRAVENOUS; SUBCUTANEOUS at 02:02

## 2020-02-25 RX ADMIN — HYDROMORPHONE HYDROCHLORIDE 1 MG: 2 INJECTION, SOLUTION INTRAMUSCULAR; INTRAVENOUS; SUBCUTANEOUS at 08:02

## 2020-02-25 NOTE — PLAN OF CARE
Problem: Occupational Therapy Goal  Goal: Occupational Therapy Goal  Description  Goals to be met by: 3/10/20    Patient will increase functional independence with ADLs by performing:    UE Dressing with Modified [place].  LE Dressing with Stand-by Assistance and Assistive Devices as needed.  Grooming while standing at sink with Modified [place] and Supervision.  Toileting from bedside commode with Modified [place] and Supervision for hygiene and clothing management.   Rolling to Bilateral with Modified [place].   Supine to sit with Modified [place].  Step transfer with Modified [place]  Toilet transfer to bedside commode with Modified [place] and Supervision.  Upper extremity exercise program x15 reps per handout, with independence.  Educate the patient re: log roll technique     Outcome: Ongoing, Progressing   OT eval is complete. The patient is able to amb using a RW to the sink with SBA/CGA. The patient is limited by c/o abdominal pain and generalized weakness. The patient should be encouraged to sit on the EOB for meals.  Patient will benefit from OT to address functional deficits.

## 2020-02-25 NOTE — PROGRESS NOTES
General Surgery Progress Note    Subjective/Interval HPI:  No acute events. Still with some abdominal pain. NG removed, no N/V, had several BMs overnight. Remains tachycardic    Objective:    Vitals:    02/25/20 0806   BP: 124/64   Pulse: (!) 130   Resp: 20   Temp: 98.8 °F (37.1 °C)         Intake/Output Summary (Last 24 hours) at 2/25/2020 1109  Last data filed at 2/25/2020 0830  Gross per 24 hour   Intake 1920 ml   Output --   Net 1920 ml       Physical exam:  General: Awake, NAD  HEENT: NC, AT, EOMI  Neck: supple, no TTP  Chest: symmetric expansion, no distress, normal WOB  Cardiovascular: RRR, normal cap refill  Abdomen: soft, obese, mildl-tender, non-distended, dressing intact and dry  Extremities: moves all, no edema  Neuro: Alert and Oriented  Skin: warm, dry        Imaging Results           CT Abdomen Pelvis  Without Contrast (Final result)  Result time 02/21/20 02:29:36    Final result by Chino Adame MD (02/21/20 02:29:36)                 Impression:      Abnormal findings referable to the colon concerning for the possibility of an obstructing malignant lesion involving the proximal aspect of the descending colon as detailed above.  Clinical and historical correlation is needed, further evaluation with colonoscopy or fluoroscopic barium examination is recommended.    There is distention of the right colon, transverse colon and proximal descending colon associated with the aforementioned, and there is mild edema/inflammation along the distal transverse colon, and splenic flexure.    Enlarged retroperitoneal lymph node on the right as discussed above.    Dilated small bowel loops are noted however extending to the terminal ileum and may relate to the colonic distention.    Thickened enlarged appearance of the appendix, to some degree may relate to a baseline appearance although more prominent than on the prior study and therefore in part may relate to the small-bowel distention, primary periappendiceal  inflammatory process not otherwise seen.    This report was flagged in Epic as abnormal.      Electronically signed by: Chino Adame  Date:    02/21/2020  Time:    02:29             Narrative:    EXAMINATION:  CT ABDOMEN PELVIS WITHOUT CONTRAST    CLINICAL HISTORY:  Abdominal pain, unspecified;    TECHNIQUE:  Low dose axial images, sagittal and coronal reformations were obtained from the lung bases to the pubic symphysis.  Intravenous contrast and oral contrast was not utilized.    COMPARISON:  December 4, 2016    FINDINGS:  The visualized lung bases demonstrate mild motion artifact and atelectatic change.  There is nonspecific mild-to-moderate distention of the stomach with fluid and air and ingested material.  Mild layering density of the gallbladder may relate to sludge and/or cholelithiasis, alternatively may relate to vicarious excretion of previously administered intravenous contrast, there is no evidence for pericholecystic or peripancreatic inflammatory change.  When accounting for limitations of the examination there is no evidence for acute process of the liver, spleen, or adrenal glands.  There is no hydronephrosis or obstructive uropathy bilaterally.  There is residual excreted contrast within the collecting structures of the kidneys, and the urinary bladder as well as along the right ureter.  The abdominal aorta appears normal in caliber otherwise not well evaluated on this examination.  Evaluation for adenopathy is limited on this examination however there are small retroperitoneal lymph nodes noted, there is also an enlarged lymph node seen just to the right of the IVC, overlying the right psoas muscle, and appearing just anterior to the right ovarian vein, as best seen on axial image 80 measuring approximately 14.9 x 22.7 mm in size.    The urinary bladder demonstrates dense opacification with oral contrast, as unremarkable appearance however is not well evaluated, this obscures structures of the  lower pelvis to some degree, evaluation of the uterus and adnexa is limited, there is no evidence for dominant adnexal mass or cystic collection.    There are dilated small bowel loops throughout the abdomen and pelvis, mild to moderate small bowel distention with fluid and air.  This extends to the level of the terminal ileum.  The appendix appears prominent and thickened particularly the distal appendix however this appearance is similar to the prior examination and may relate to variant anatomy although is somewhat more prominent, which could relate to the aforementioned bowel distention.  There is moderate to prominent distention involving the right colon, and transverse colon, with mild-to-moderate distention involving the splenic flexure, and proximal descending colon.  There is some mild pericolonic edema suggested along the distal transverse colon, and splenic flexure that may relate to mild superimposed inflammation/edema or colitis.  As best seen on coronal images 89 through 98, axial images 43 through 59 there is abrupt transition from dilated proximal descending colon to decompressed descending colon.  Adjacent to this there are multiple small lymph nodes, with an associated finding that may relate to an enlarged lymph node or cluster of smaller lymph nodes as seen on axial image 50 measuring up to approximately 15.7 by 22.7 mm.  The possibility that this represents an inflammatory process or a stricture is a consideration however the combined findings are most concerning for malignancy.  Clinical and historical correlation is needed if previously unknown further evaluation with colonoscopy or fluoroscopic barium examination is recommended.  The remainder of the descending colon and the rectosigmoid colon do not appear abnormally distended there is some mild air and stool at the rectum and distal sigmoid colon.  There is no evidence for pneumatosis, there is no evidence for free intraperitoneal air,  there is no evidence for mesenteric venous or portal venous air.  The visualized osseous structures appear intact.  Chronic changes are noted.                               X-Ray Abdomen Flat And Erect (Final result)  Result time 02/21/20 01:11:40    Final result by Chino Adame MD (02/21/20 01:11:40)                 Impression:      There are mildly prominent air-filled small bowel loops and colonic loops with some air-fluid levels of the small bowel and colon noted, there is relative paucity of air within the distal colon, this is nonspecific although the possibility of a distal obstructing process would be in the differential.  Clinical and historical correlation is otherwise needed.      Electronically signed by: Chino Adame  Date:    02/21/2020  Time:    01:11             Narrative:    EXAMINATION:  XR ABDOMEN FLAT AND ERECT    CLINICAL HISTORY:  Constipation, unspecified    TECHNIQUE:  Flat and erect AP views of the abdomen were performed.    COMPARISON:  February 18, 2020    FINDINGS:  Abdominal radiographic examination is submitted, 3 radiographs are submitted.  There is no evidence for free intraperitoneal air.  There is appearance thought to represent mild-to-moderate air-filled distention of the colon, predominantly the right colon and transverse colon and splenic flexure and the proximal to mid descending colon, relative paucity of air seen along the distal colon.  There are some prominent air-filled small bowel loops noted as well.  An on the erect view there appear to be some air-filled levels within the colon and small bowel.  Clinical and historical correlation is needed the possibility of a distal colonic obstructing process would be in the differential.    There is contrast density within the collecting structures of the kidneys and the urinary bladder likely relating to recent CT examination.  Calcifications of the pelvis are nonspecific may relate to phleboliths.  The osseous structures  demonstrate chronic change.                               US Lower Extremity Veins Left (Final result)  Result time 02/21/20 00:20:30    Final result by Britany Monzon MD (02/21/20 00:20:30)                 Impression:      Limited examination.  No evidence of deep venous thrombosis in the left lower extremity.    Possible Baker's cyst.      Electronically signed by: Britany Monzon  Date:    02/21/2020  Time:    00:20             Narrative:    EXAMINATION:  US LOWER EXTREMITY VEINS LEFT    CLINICAL HISTORY:  Pain in leg, unspecified    TECHNIQUE:  Duplex and color flow Doppler evaluation and graded compression of the left lower extremity veins was performed.    COMPARISON:  11/14/2018    FINDINGS:  Left thigh veins: The common femoral, femoral, popliteal, upper greater saphenous, and deep femoral veins are patent and free of thrombus. The veins are normally compressible and have normal phasic flow and augmentation response.    Left calf veins: The visualized calf veins are patent.    Contralateral CFV: The contralateral (right) common femoral vein is patent and free of thrombus.    Miscellaneous: Limited evaluation secondary to habitus.  There is a focal fluid collection measuring 2.8 x 0.9 x 2.2 cm.                               CTA Chest Non-Coronary (PE Study) (Final result)  Result time 02/20/20 21:58:29    Final result by Britany Monzon MD (02/20/20 21:58:29)                 Impression:      Suboptimal intravenous bolus.  No evidence of acute pulmonary embolism in the pulmonary trunk or main pulmonary arteries.      Electronically signed by: Britany Monzon  Date:    02/20/2020  Time:    21:58             Narrative:    EXAMINATION:  CT PULMONARY ANGIOGRAM WITH CONTRAST    CLINICAL HISTORY:  Chest pain.    TECHNIQUE:  CT of the chest with intravenous contrast for pulmonary artery angiogram was performed. Contiguous axial 1.25 mm images followed by 10 mm reconstructions with multiplanar and MIP  reformations of the pulmonary arteries. No 3D post-angiographic imaging was performed on an independent workstation and reviewed.  75 ml of Omnipaque 350 was injected.    COMPARISON:  08/27/2018    FINDINGS:  A suboptimal intravenous bolus is present.  There is no evidence of pulmonary artery filling defect to suggest pulmonary embolism in the pulmonary trunk or main pulmonary arteries.  There is no aortic aneurysm or aortic dissection.    Moderate bibasilar atelectasis or scarring is present.    There is no evidence of mediastinal, hilar, or axillary adenopathy.    There is no pleural or pericardial effusion.    The heart size is within normal limits.                                  Assessment: 39 y.o. female s/p partial colectomy with anastomosis, POD2    Plan:  -will advance diet to Clears  -continue fluids  -continue pain control  -tachycardia - improved mildy with fluids. H/H largely stable.   -OOB,  -Ambulate at least TID  -PT/OT  -IS use, aggressive pulm toilet.       Ravi Albright, PGYIII  [facility] Surgery Resident

## 2020-02-25 NOTE — PLAN OF CARE
02/25/20 0932   Discharge Reassessment   Assessment Type Discharge Planning Reassessment   Discharge Plan A Home   Discharge Plan B Home;Home Health   DME Needed Upon Discharge  none   Patient choice form signed by patient/caregiver N/A   Anticipated Discharge Disposition Home   Can the patient/caregiver answer the patient profile reliably? Yes, cognitively intact   How does the patient rate their overall health at the present time? Fair   Describe the patient's ability to walk at the present time. No restrictions

## 2020-02-25 NOTE — NURSING
Spoke with Dr. Albright regarding the patient's current heart rate is 122.  At this time, we will continue to monitor

## 2020-02-25 NOTE — PT/OT/SLP EVAL
"Occupational Therapy   Evaluation/Treatment    Name: Maryam Oneal  MRN: [id number]  Admitting Diagnosis:  Large bowel obstruction 2 Days Post-Op    Recommendations:     Discharge Recommendations: home(with family assist)  Discharge Equipment Recommendations:  none  Barriers to discharge:  None    Assessment:     Maryam Oneal is a 39 y.o. female with a medical diagnosis of Large bowel obstruction.  She presents with self care and functional mobility deficits T/T abdominal pain and generalized weakness. The patient also has difficulty getting from low surfaces 2* 6'5". The patient will benefit from sitting on the EOB for meals. Performance deficits affecting function: weakness, impaired endurance, impaired self care skills, impaired functional mobilty, gait instability, impaired balance, decreased safety awareness, impaired skin, pain, impaired cardiopulmonary response to activity.      Rehab Prognosis: Good; patient would benefit from acute skilled OT services to address these deficits and reach maximum level of function.       Plan:     Patient to be seen 3 x/week to address the above listed problems via self-care/home management, therapeutic activities, therapeutic exercises  · Plan of Care Expires: 03/10/20  · Plan of Care Reviewed with: patient, mother    Subjective     Chief Complaint: feels weak  Patient/Family Comments/goals: get stronger    Occupational Profile:  Living Environment: The patient lives in  house with 3 ARTURO; The patient plans to D/C to her mother's house.  Previous level of function: The patient was (I) with all self care and mobility without AD.  Roles and Routines: The patient has 8 children and does not work.  Equipment Used at Home:  none  Assistance upon Discharge: mother    Pain/Comfort:  · Pain Rating 1: 5/10  · Location 1: abdomen  · Pain Addressed 1: Cessation of Activity, Pre-medicate for activity, Nurse notified    Patients cultural, spiritual, Jainism conflicts given the current " situation: no    Objective:     Communicated with: nurseAracelis prior to session.  Patient found seated on the BSC with peripheral IV upon OT entry to room.    General Precautions: Standard, fall(abdominal surgery)   Orthopedic Precautions:N/A   Braces: N/A     Occupational Performance:    Bed Mobility:    · N/T    Functional Mobility/Transfers:  · Patient completed Sit <> Stand Transfer with contact guard assistance  with  rolling walker and verbal cues and increased time to stand 1x from the BSC and 1x from the EOB   · Patient completed Toilet Transfer Step Transfer technique with contact guard assistance with  rolling walker and bedside commode  · Functional Mobility: The patient stepped from the BSC to the bed and amb using a RW from the bed <>sink ~12'x2 with SBA.  The patient tolerated standing at the sink ~8 min to brush her teeth and wash her hands and face.    Activities of Daily Living:  · Grooming: stand by assistance to stand at the sink with a RW   · Upper Body Dressing: minimum assistance to don back gown  · Lower Body Dressing: dependence    · Toileting: dependence to wipe after a BM on the BSC. The patient was able to stand with use of a RW for support to allow cleaning with wipes. (The patient with some blood present while wiping. NurseAracelis was notified)    Cognitive/Visual Perceptual:  Cognitive/Psychosocial Skills:     -       Oriented to: Person, Place, Time and Situation   -       Follows Commands/attention:Follows two-step commands  -       Communication: clear/fluent  -       Memory: No Deficits noted  -       Safety awareness/insight to disability: impaired   -       Mood/Affect/Coping skills/emotional control: Appropriate to situation and quiet and minimal verbalization    Physical Exam:  Balance: -       fair+  Postural examination/scapula alignment:    -       Rounded shoulders  -       Forward head  Skin integrity: Visible skin intact and abdominal incision covered by dressing  Edema:   None noted  Upper Extremity Range of Motion:     -       Right Upper Extremity: WFL  -       Left Upper Extremity: WFL  Upper Extremity Strength:     Strength:    -       Right Upper Extremity: WFL  -       Left Upper Extremity: WFL    AMPAC 6 Click ADL:  AMPAC Total Score: 16    Treatment & Education:  The patient participated in OT eval and was educated re: the need to sit on the EOB 3x/day as tolerated. The patient was also educated re: log roll technique and use of a pillow for abdominal pain control with movement. The patient was given a barriatric RW to assist with BSC transfers.  · Pt educated on OT role/POC.   · Importance of OOB activity with staff assistance.  · Importance of sitting on the EOB throughout the day as tolerated, especially for meals   · Safety during functional t/f and mobility with use of RW  · Multiple self-care tasks/functional mobility completed- assistance level noted above   Education:    Patient left seated on the EOB with all lines intact, call button in reach, nurseAracelis notified and mother present    GOALS:   Multidisciplinary Problems     Occupational Therapy Goals        Problem: Occupational Therapy Goal    Goal Priority Disciplines Outcome Interventions   Occupational Therapy Goal     OT, PT/OT Ongoing, Progressing    Description:  Goals to be met by: 3/10/20    Patient will increase functional independence with ADLs by performing:    UE Dressing with Modified [place].  LE Dressing with Stand-by Assistance and Assistive Devices as needed.  Grooming while standing at sink with Modified [place] and Supervision.  Toileting from bedside commode with Modified [place] and Supervision for hygiene and clothing management.   Rolling to Bilateral with Modified [place].   Supine to sit with Modified [place].  Step transfer with Modified [place]  Toilet transfer to bedside commode with Modified [place] and Supervision.  Upper extremity exercise  program x15 reps per handout, with independence.  Educate the patient re: log roll technique                      History:     Past Medical History:   Diagnosis Date    Arthritis     DVT (deep venous thrombosis)     Hypertension        Past Surgical History:   Procedure Laterality Date     SECTION, LOW TRANSVERSE  2010    COLECTOMY N/A 2020    Procedure: COLECTOMY;  Surgeon: Sky Patel MD;  Location: [facility] OR;  Service: General;  Laterality: N/A;    FLEXIBLE SIGMOIDOSCOPY N/A 2020    Procedure: SIGMOIDOSCOPY, FLEXIBLE;  Surgeon: Ez Bartlett MD;  Location: [facility] ENDO;  Service: Endoscopy;  Laterality: N/A;    TUBAL LIGATION             Time Tracking:     OT Date of Treatment: 20  OT Start Time: 1513  OT Stop Time: 1548  OT Total Time (min): 35 min    Billable Minutes:Evaluation 20  Self Care/Home Management 15  Total Time 35    Sophie San OT  2020

## 2020-02-25 NOTE — PLAN OF CARE
Problem: Adult Inpatient Plan of Care  Goal: Plan of Care Review  2/24/2020 1821 by Mariama Tellez RN  Outcome: Ongoing, Progressing  2/24/2020 1820 by Mariama Tellez RN  Outcome: Ongoing, Progressing  Goal: Patient-Specific Goal (Individualization)  2/24/2020 1821 by Mariama Tellez RN  Outcome: Ongoing, Progressing  2/24/2020 1820 by Mariama Tellez RN  Outcome: Ongoing, Progressing  Goal: Absence of Hospital-Acquired Illness or Injury  2/24/2020 1821 by Mariama Tellez RN  Outcome: Ongoing, Progressing  2/24/2020 1820 by Mariama Tellez RN  Outcome: Ongoing, Progressing  Goal: Optimal Comfort and Wellbeing  2/24/2020 1821 by Mariama Tellez RN  Outcome: Ongoing, Progressing  2/24/2020 1820 by Mariama Tellez RN  Outcome: Ongoing, Progressing  Goal: Readiness for Transition of Care  2/24/2020 1821 by Mariama Tellez RN  Outcome: Ongoing, Progressing  2/24/2020 1820 by Mariama Tellez RN  Outcome: Ongoing, Progressing  Goal: Rounds/Family Conference  2/24/2020 1821 by Mariama Tellez RN  Outcome: Ongoing, Progressing  2/24/2020 1820 by Mariama Tellez RN  Outcome: Ongoing, Progressing     Problem: Fall Injury Risk  Goal: Absence of Fall and Fall-Related Injury  2/24/2020 1821 by Mariama Tellez RN  Outcome: Ongoing, Progressing  2/24/2020 1820 by Mariama Tellez RN  Outcome: Ongoing, Progressing     Problem: Infection  Goal: Infection Symptom Resolution  2/24/2020 1821 by Mariama Tellez RN  Outcome: Ongoing, Progressing  2/24/2020 1820 by Mariama Tellez RN  Outcome: Ongoing, Progressing

## 2020-02-26 PROBLEM — I47.9 PAROXYSMAL TACHYCARDIA: Status: ACTIVE | Noted: 2020-02-26

## 2020-02-26 LAB
ABO + RH BLD: NORMAL
ALBUMIN SERPL BCP-MCNC: 2.2 G/DL (ref 3.5–5.2)
ALP SERPL-CCNC: 70 U/L (ref 55–135)
ALT SERPL W/O P-5'-P-CCNC: 12 U/L (ref 10–44)
ANION GAP SERPL CALC-SCNC: 9 MMOL/L (ref 8–16)
AORTIC ROOT ANNULUS: 3.87 CM
AORTIC VALVE CUSP SEPERATION: 2.85 CM
ASCENDING AORTA: 3.08 CM
AST SERPL-CCNC: 17 U/L (ref 10–40)
AV INDEX (PROSTH): 0.96
AV MEAN GRADIENT: 7 MMHG
AV PEAK GRADIENT: 11 MMHG
AV VALVE AREA: 4.76 CM2
AV VELOCITY RATIO: 0.83
BASOPHILS # BLD AUTO: 0.01 K/UL (ref 0–0.2)
BASOPHILS NFR BLD: 0.1 % (ref 0–1.9)
BILIRUB SERPL-MCNC: 0.5 MG/DL (ref 0.1–1)
BLD GP AB SCN CELLS X3 SERPL QL: NORMAL
BSA FOR ECHO PROCEDURE: 3.34 M2
BUN SERPL-MCNC: 19 MG/DL (ref 6–20)
CALCIUM SERPL-MCNC: 8.7 MG/DL (ref 8.7–10.5)
CHLORIDE SERPL-SCNC: 102 MMOL/L (ref 95–110)
CO2 SERPL-SCNC: 23 MMOL/L (ref 23–29)
CREAT SERPL-MCNC: 1 MG/DL (ref 0.5–1.4)
CV ECHO LV RWT: 0.84 CM
DIFFERENTIAL METHOD: ABNORMAL
DOP CALC AO PEAK VEL: 1.65 M/S
DOP CALC AO VTI: 18.33 CM
DOP CALC LVOT AREA: 5 CM2
DOP CALC LVOT DIAMETER: 2.52 CM
DOP CALC LVOT PEAK VEL: 1.37 M/S
DOP CALC LVOT STROKE VOLUME: 87.34 CM3
DOP CALCLVOT PEAK VEL VTI: 17.52 CM
E WAVE DECELERATION TIME: 132.6 MSEC
E/A RATIO: 0.84
E/E' RATIO: 9.33 M/S
ECHO LV POSTERIOR WALL: 1.3 CM (ref 0.6–1.1)
EOSINOPHIL # BLD AUTO: 0 K/UL (ref 0–0.5)
EOSINOPHIL NFR BLD: 0.4 % (ref 0–8)
ERYTHROCYTE [DISTWIDTH] IN BLOOD BY AUTOMATED COUNT: 16.1 % (ref 11.5–14.5)
EST. GFR  (AFRICAN AMERICAN): >60 ML/MIN/1.73 M^2
EST. GFR  (NON AFRICAN AMERICAN): >60 ML/MIN/1.73 M^2
FINAL PATHOLOGIC DIAGNOSIS: NORMAL
FRACTIONAL SHORTENING: 42 % (ref 28–44)
GLUCOSE SERPL-MCNC: 112 MG/DL (ref 70–110)
GROSS: NORMAL
HCT VFR BLD AUTO: 27.9 % (ref 37–48.5)
HGB BLD-MCNC: 8.3 G/DL (ref 12–16)
IMM GRANULOCYTES # BLD AUTO: 0.04 K/UL (ref 0–0.04)
IMM GRANULOCYTES NFR BLD AUTO: 0.5 % (ref 0–0.5)
INTERVENTRICULAR SEPTUM: 1.29 CM (ref 0.6–1.1)
IVRT: 0.06 MSEC
LA MAJOR: 5.32 CM
LA MINOR: 5.36 CM
LA WIDTH: 3.19 CM
LEFT ATRIUM SIZE: 4.26 CM
LEFT ATRIUM VOLUME INDEX: 19.5 ML/M2
LEFT ATRIUM VOLUME: 61.68 CM3
LEFT INTERNAL DIMENSION IN SYSTOLE: 1.81 CM (ref 2.1–4)
LEFT VENTRICLE DIASTOLIC VOLUME INDEX: 12.1 ML/M2
LEFT VENTRICLE DIASTOLIC VOLUME: 38.19 ML
LEFT VENTRICLE MASS INDEX: 41 G/M2
LEFT VENTRICLE SYSTOLIC VOLUME INDEX: 3.1 ML/M2
LEFT VENTRICLE SYSTOLIC VOLUME: 9.91 ML
LEFT VENTRICULAR INTERNAL DIMENSION IN DIASTOLE: 3.11 CM (ref 3.5–6)
LEFT VENTRICULAR MASS: 129.65 G
LV LATERAL E/E' RATIO: 10.5 M/S
LV SEPTAL E/E' RATIO: 8.4 M/S
LYMPHOCYTES # BLD AUTO: 0.9 K/UL (ref 1–4.8)
LYMPHOCYTES NFR BLD: 11.1 % (ref 18–48)
MCH RBC QN AUTO: 25.5 PG (ref 27–31)
MCHC RBC AUTO-ENTMCNC: 29.7 G/DL (ref 32–36)
MCV RBC AUTO: 86 FL (ref 82–98)
MONOCYTES # BLD AUTO: 1.1 K/UL (ref 0.3–1)
MONOCYTES NFR BLD: 13.9 % (ref 4–15)
MV PEAK A VEL: 1 M/S
MV PEAK E VEL: 0.84 M/S
NEUTROPHILS # BLD AUTO: 6.1 K/UL (ref 1.8–7.7)
NEUTROPHILS NFR BLD: 74 % (ref 38–73)
NRBC BLD-RTO: 0 /100 WBC
PISA TR MAX VEL: 2.41 M/S
PLATELET # BLD AUTO: 223 K/UL (ref 150–350)
PLATELET BLD QL SMEAR: ABNORMAL
PMV BLD AUTO: 9.9 FL (ref 9.2–12.9)
POTASSIUM SERPL-SCNC: 3.1 MMOL/L (ref 3.5–5.1)
PROT SERPL-MCNC: 6.4 G/DL (ref 6–8.4)
PV PEAK VELOCITY: 1.42 CM/S
RA MAJOR: 4.34 CM
RA WIDTH: 2.58 CM
RBC # BLD AUTO: 3.26 M/UL (ref 4–5.4)
RIGHT VENTRICULAR END-DIASTOLIC DIMENSION: 2.94 CM
RV TISSUE DOPPLER FREE WALL SYSTOLIC VELOCITY 1 (APICAL 4 CHAMBER VIEW): 25.55 CM/S
SINUS: 3.73 CM
SODIUM SERPL-SCNC: 134 MMOL/L (ref 136–145)
STJ: 3.06 CM
TDI LATERAL: 0.08 M/S
TDI SEPTAL: 0.1 M/S
TDI: 0.09 M/S
TR MAX PG: 23 MMHG
TRICUSPID ANNULAR PLANE SYSTOLIC EXCURSION: 1.73 CM
TSH SERPL DL<=0.005 MIU/L-ACNC: 0.44 UIU/ML (ref 0.4–4)
WBC # BLD AUTO: 8.21 K/UL (ref 3.9–12.7)

## 2020-02-26 PROCEDURE — 25000003 PHARM REV CODE 250: Performed by: STUDENT IN AN ORGANIZED HEALTH CARE EDUCATION/TRAINING PROGRAM

## 2020-02-26 PROCEDURE — 63600175 PHARM REV CODE 636 W HCPCS: Performed by: SURGERY

## 2020-02-26 PROCEDURE — 85025 COMPLETE CBC W/AUTO DIFF WBC: CPT

## 2020-02-26 PROCEDURE — 36415 COLL VENOUS BLD VENIPUNCTURE: CPT

## 2020-02-26 PROCEDURE — 80053 COMPREHEN METABOLIC PANEL: CPT

## 2020-02-26 PROCEDURE — 94761 N-INVAS EAR/PLS OXIMETRY MLT: CPT

## 2020-02-26 PROCEDURE — 25000003 PHARM REV CODE 250: Performed by: INTERNAL MEDICINE

## 2020-02-26 PROCEDURE — 27000221 HC OXYGEN, UP TO 24 HOURS

## 2020-02-26 PROCEDURE — 94799 UNLISTED PULMONARY SVC/PX: CPT

## 2020-02-26 PROCEDURE — S5010 5% DEXTROSE AND 0.45% SALINE: HCPCS | Performed by: STUDENT IN AN ORGANIZED HEALTH CARE EDUCATION/TRAINING PROGRAM

## 2020-02-26 PROCEDURE — 25500020 PHARM REV CODE 255: Performed by: SURGERY

## 2020-02-26 PROCEDURE — 86920 COMPATIBILITY TEST SPIN: CPT

## 2020-02-26 PROCEDURE — 25000003 PHARM REV CODE 250: Performed by: SURGERY

## 2020-02-26 PROCEDURE — 63600175 PHARM REV CODE 636 W HCPCS

## 2020-02-26 PROCEDURE — 97535 SELF CARE MNGMENT TRAINING: CPT

## 2020-02-26 PROCEDURE — 63600175 PHARM REV CODE 636 W HCPCS: Performed by: STUDENT IN AN ORGANIZED HEALTH CARE EDUCATION/TRAINING PROGRAM

## 2020-02-26 PROCEDURE — 86901 BLOOD TYPING SEROLOGIC RH(D): CPT

## 2020-02-26 PROCEDURE — C9113 INJ PANTOPRAZOLE SODIUM, VIA: HCPCS | Performed by: SURGERY

## 2020-02-26 PROCEDURE — 84443 ASSAY THYROID STIM HORMONE: CPT

## 2020-02-26 PROCEDURE — 11000001 HC ACUTE MED/SURG PRIVATE ROOM

## 2020-02-26 PROCEDURE — 25000003 PHARM REV CODE 250: Performed by: HOSPITALIST

## 2020-02-26 RX ORDER — METOPROLOL TARTRATE 1 MG/ML
5 INJECTION, SOLUTION INTRAVENOUS ONCE
Status: DISCONTINUED | OUTPATIENT
Start: 2020-02-26 | End: 2020-02-26

## 2020-02-26 RX ORDER — METOPROLOL TARTRATE 1 MG/ML
5 INJECTION, SOLUTION INTRAVENOUS ONCE
Status: COMPLETED | OUTPATIENT
Start: 2020-02-26 | End: 2020-02-26

## 2020-02-26 RX ORDER — ONDANSETRON HYDROCHLORIDE 4 MG/5ML
4 SOLUTION ORAL ONCE
Status: DISCONTINUED | OUTPATIENT
Start: 2020-02-26 | End: 2020-02-26

## 2020-02-26 RX ORDER — HYDROCODONE BITARTRATE AND ACETAMINOPHEN 500; 5 MG/1; MG/1
TABLET ORAL
Status: DISCONTINUED | OUTPATIENT
Start: 2020-02-26 | End: 2020-03-02 | Stop reason: HOSPADM

## 2020-02-26 RX ORDER — METOPROLOL TARTRATE 1 MG/ML
5 INJECTION, SOLUTION INTRAVENOUS EVERY 5 MIN PRN
Status: DISCONTINUED | OUTPATIENT
Start: 2020-02-26 | End: 2020-02-26

## 2020-02-26 RX ORDER — OXYCODONE AND ACETAMINOPHEN 10; 325 MG/1; MG/1
1 TABLET ORAL EVERY 4 HOURS PRN
Status: DISCONTINUED | OUTPATIENT
Start: 2020-02-26 | End: 2020-02-29

## 2020-02-26 RX ORDER — METOPROLOL TARTRATE 1 MG/ML
2.5 INJECTION, SOLUTION INTRAVENOUS 4 TIMES DAILY PRN
Status: DISCONTINUED | OUTPATIENT
Start: 2020-02-27 | End: 2020-03-02 | Stop reason: HOSPADM

## 2020-02-26 RX ORDER — HYDROMORPHONE HYDROCHLORIDE 2 MG/ML
0.5 INJECTION, SOLUTION INTRAMUSCULAR; INTRAVENOUS; SUBCUTANEOUS
Status: DISCONTINUED | OUTPATIENT
Start: 2020-02-26 | End: 2020-02-28

## 2020-02-26 RX ORDER — METOPROLOL TARTRATE 1 MG/ML
2.5 INJECTION, SOLUTION INTRAVENOUS 4 TIMES DAILY PRN
Status: DISCONTINUED | OUTPATIENT
Start: 2020-02-26 | End: 2020-02-26

## 2020-02-26 RX ORDER — ONDANSETRON 2 MG/ML
4 INJECTION INTRAMUSCULAR; INTRAVENOUS EVERY 4 HOURS PRN
Status: DISCONTINUED | OUTPATIENT
Start: 2020-02-26 | End: 2020-03-02 | Stop reason: HOSPADM

## 2020-02-26 RX ORDER — OXYCODONE AND ACETAMINOPHEN 5; 325 MG/1; MG/1
1 TABLET ORAL EVERY 4 HOURS PRN
Status: DISCONTINUED | OUTPATIENT
Start: 2020-02-26 | End: 2020-02-29

## 2020-02-26 RX ORDER — OXYCODONE AND ACETAMINOPHEN 5; 325 MG/1; MG/1
1 TABLET ORAL EVERY 4 HOURS PRN
Status: DISCONTINUED | OUTPATIENT
Start: 2020-02-26 | End: 2020-02-26

## 2020-02-26 RX ORDER — DIPHENHYDRAMINE HCL 25 MG
25 CAPSULE ORAL EVERY 6 HOURS PRN
Status: DISCONTINUED | OUTPATIENT
Start: 2020-02-26 | End: 2020-03-02 | Stop reason: HOSPADM

## 2020-02-26 RX ORDER — METOPROLOL TARTRATE 1 MG/ML
2.5 INJECTION, SOLUTION INTRAVENOUS 4 TIMES DAILY PRN
Status: DISCONTINUED | OUTPATIENT
Start: 2020-02-26 | End: 2020-02-26 | Stop reason: SDUPTHER

## 2020-02-26 RX ADMIN — METOPROLOL TARTRATE 5 MG: 1 INJECTION, SOLUTION INTRAVENOUS at 11:02

## 2020-02-26 RX ADMIN — DEXTROSE AND SODIUM CHLORIDE: 5; .45 INJECTION, SOLUTION INTRAVENOUS at 09:02

## 2020-02-26 RX ADMIN — HYDROMORPHONE HYDROCHLORIDE 0.5 MG: 2 INJECTION, SOLUTION INTRAMUSCULAR; INTRAVENOUS; SUBCUTANEOUS at 04:02

## 2020-02-26 RX ADMIN — MUPIROCIN 1 G: 20 OINTMENT TOPICAL at 09:02

## 2020-02-26 RX ADMIN — IOHEXOL 100 ML: 350 INJECTION, SOLUTION INTRAVENOUS at 06:02

## 2020-02-26 RX ADMIN — ALVIMOPAN 12 MG: 12 CAPSULE ORAL at 09:02

## 2020-02-26 RX ADMIN — HYDROMORPHONE HYDROCHLORIDE 1 MG: 2 INJECTION, SOLUTION INTRAMUSCULAR; INTRAVENOUS; SUBCUTANEOUS at 07:02

## 2020-02-26 RX ADMIN — HYDROMORPHONE HYDROCHLORIDE 1 MG: 2 INJECTION, SOLUTION INTRAMUSCULAR; INTRAVENOUS; SUBCUTANEOUS at 09:02

## 2020-02-26 RX ADMIN — ONDANSETRON HYDROCHLORIDE 4 MG: 2 SOLUTION INTRAMUSCULAR; INTRAVENOUS at 04:02

## 2020-02-26 RX ADMIN — PANTOPRAZOLE SODIUM 40 MG: 40 INJECTION, POWDER, FOR SOLUTION INTRAVENOUS at 09:02

## 2020-02-26 RX ADMIN — PIPERACILLIN AND TAZOBACTAM 4.5 G: 4; .5 INJECTION, POWDER, LYOPHILIZED, FOR SOLUTION INTRAVENOUS; PARENTERAL at 10:02

## 2020-02-26 RX ADMIN — METOPROLOL TARTRATE 2.5 MG: 5 INJECTION INTRAVENOUS at 06:02

## 2020-02-26 RX ADMIN — ONDANSETRON HYDROCHLORIDE 4 MG: 2 SOLUTION INTRAMUSCULAR; INTRAVENOUS at 12:02

## 2020-02-26 RX ADMIN — TRAZODONE HYDROCHLORIDE 50 MG: 50 TABLET ORAL at 09:02

## 2020-02-26 RX ADMIN — DULOXETINE HYDROCHLORIDE 60 MG: 30 CAPSULE, DELAYED RELEASE ORAL at 09:02

## 2020-02-26 RX ADMIN — OXYCODONE HYDROCHLORIDE AND ACETAMINOPHEN 1 TABLET: 10; 325 TABLET ORAL at 06:02

## 2020-02-26 RX ADMIN — LOSARTAN POTASSIUM 100 MG: 25 TABLET ORAL at 09:02

## 2020-02-26 RX ADMIN — HYDROMORPHONE HYDROCHLORIDE 1 MG: 2 INJECTION, SOLUTION INTRAMUSCULAR; INTRAVENOUS; SUBCUTANEOUS at 03:02

## 2020-02-26 RX ADMIN — HEPARIN SODIUM 5000 UNITS: 5000 INJECTION, SOLUTION INTRAVENOUS; SUBCUTANEOUS at 05:02

## 2020-02-26 RX ADMIN — HYDROMORPHONE HYDROCHLORIDE 1 MG: 2 INJECTION, SOLUTION INTRAMUSCULAR; INTRAVENOUS; SUBCUTANEOUS at 05:02

## 2020-02-26 RX ADMIN — HEPARIN SODIUM 5000 UNITS: 5000 INJECTION, SOLUTION INTRAVENOUS; SUBCUTANEOUS at 04:02

## 2020-02-26 RX ADMIN — HEPARIN SODIUM 5000 UNITS: 5000 INJECTION, SOLUTION INTRAVENOUS; SUBCUTANEOUS at 09:02

## 2020-02-26 RX ADMIN — HYDROMORPHONE HYDROCHLORIDE 1 MG: 2 INJECTION, SOLUTION INTRAMUSCULAR; INTRAVENOUS; SUBCUTANEOUS at 01:02

## 2020-02-26 RX ADMIN — OXYCODONE HYDROCHLORIDE AND ACETAMINOPHEN 1 TABLET: 10; 325 TABLET ORAL at 12:02

## 2020-02-26 NOTE — ASSESSMENT & PLAN NOTE
Concern for PE - h/o DVT and has been off eliquis  DVT study negative  Chest CTA pending  ECHo pending  TSH pending  Possibly pain induced - pt reports not controlled  Anxiety - resume home meds    Trial low dose IV metoprolol if HR >115 and BP can tolerate

## 2020-02-26 NOTE — PLAN OF CARE
Problem: Adult Inpatient Plan of Care  Goal: Plan of Care Review  2/26/2020 0249 by Sadie Healy RN  Outcome: Ongoing, Progressing  2/26/2020 0248 by Sadie Healy RN  Outcome: Ongoing, Progressing  Goal: Patient-Specific Goal (Individualization)  2/26/2020 0249 by Sadie Healy RN  Outcome: Ongoing, Progressing  2/26/2020 0248 by Sadie Healy RN  Outcome: Ongoing, Progressing  Goal: Absence of Hospital-Acquired Illness or Injury  2/26/2020 0249 by Sadie Healy RN  Outcome: Ongoing, Progressing  2/26/2020 0248 by Sadie Healy RN  Outcome: Ongoing, Progressing  Goal: Optimal Comfort and Wellbeing  2/26/2020 0249 by Sadie Healy RN  Outcome: Ongoing, Progressing  2/26/2020 0248 by Sadie Healy RN  Outcome: Ongoing, Progressing  Goal: Readiness for Transition of Care  2/26/2020 0249 by Sadie Healy RN  Outcome: Ongoing, Progressing  2/26/2020 0248 by Sadie Healy RN  Outcome: Ongoing, Progressing  Goal: Rounds/Family Conference  2/26/2020 0249 by Sadie Healy RN  Outcome: Ongoing, Progressing  2/26/2020 0248 by Sadie Healy RN  Outcome: Ongoing, Progressing     Problem: Fall Injury Risk  Goal: Absence of Fall and Fall-Related Injury  2/26/2020 0249 by Sadie Healy RN  Outcome: Ongoing, Progressing  2/26/2020 0248 by Sadie Healy RN  Outcome: Ongoing, Progressing     Problem: Infection  Goal: Infection Symptom Resolution  2/26/2020 0249 by Sadie Healy RN  Outcome: Ongoing, Progressing  2/26/2020 0248 by Sadie Healy RN  Outcome: Ongoing, Progressing

## 2020-02-26 NOTE — NURSING
CT called to send for patient for CTA, patient just arrived from cardiology and ultrasound, had large BM, need to clean will call back when completed, will also get a 18 gauge new IV. Verbalized understanding

## 2020-02-26 NOTE — PROGRESS NOTES
S:  Flattened affect.  Denies new complaints except vague abdominal pain.  Having bowel movements.      O:  Remains tachycardic but this is improving.    Abdomen obese, no gaurding/rebound.    Normal WOB, on NC  Incisions c/d/i without sign of infection  Afebrile.      A/P:  POD3 from partial segmental colectomy and primary anastomosis.  Tolerating PO and having bowel movements.  Remains tachycardic.      -obtain B/L DVT us due to tachycardia and leg swelling  -advance diet  -oral medications  -PT/OT  -ambulate/IS pulm toilet    Joseph Liang MD  [facility] Surgery Resident

## 2020-02-26 NOTE — NURSING
Called CT scan, ok to send for patient to get test, finished cleaning and new IV. Verbalized understanding

## 2020-02-26 NOTE — CONSULTS
[facility] Hospital Medicine  Consult Note    Patient Name: Maryam Oneal  MRN: [id number]  Admission Date: 2020  Hospital Length of Stay: 5 days  Attending Physician: Dariel Mcginnis MD   Primary Care Provider: Phuong Reynoso MD           Patient information was obtained from patient, relative(s) and ER records.     Consults  Subjective:     Principal Problem: Large bowel obstruction    Chief Complaint:   Chief Complaint   Patient presents with    Abdominal Pain     pt reports x1 month of bilateral abd pain. NV noted. denies dysuria, or bleeding. pt was recently here for similar symptoms and pain has increased.         HPI: 38yo F with two months history of cramping pain and poor appetite found to have large bowel obstruction on CT with obstructing colon mass. Underwent c/scope and partial colectomy with anastomosis .  Path: high dysplasia concerning for adenocarcinoma. Hospital medicine consulted for tachycardia. Pt has h/o PE and has been off eliquis for awhile.     DVT study negative  Chest CTA pending   Pt having BMs, advanced diet    D/w primary team, Dr. Liang- pt has some vaginal vs rectal bleeding - will proceed with anticoagulation if positive study    Past Medical History:   Diagnosis Date    Arthritis     DVT (deep venous thrombosis)     Hypertension        Past Surgical History:   Procedure Laterality Date     SECTION, LOW TRANSVERSE  2010    COLECTOMY N/A 2020    Procedure: COLECTOMY;  Surgeon: kSy Patel MD;  Location: [facility] OR;  Service: General;  Laterality: N/A;    FLEXIBLE SIGMOIDOSCOPY N/A 2020    Procedure: SIGMOIDOSCOPY, FLEXIBLE;  Surgeon: Ez Bartlett MD;  Location: [facility] ENDO;  Service: Endoscopy;  Laterality: N/A;    TUBAL LIGATION             Review of patient's allergies indicates:  No Known Allergies    No current facility-administered medications on file prior to encounter.      Current Outpatient Medications on File  Prior to Encounter   Medication Sig    docusate sodium (COLACE) 100 MG capsule Take 1 capsule (100 mg total) by mouth 2 (two) times daily.    losartan (COZAAR) 100 MG tablet Take 1 tablet (100 mg total) by mouth once daily.    acetaminophen (TYLENOL) 325 MG tablet Take 2 tablets (650 mg total) by mouth every 6 (six) hours as needed.    apixaban (ELIQUIS) 2.5 mg Tab Take 2.5 mg by mouth 2 (two) times daily.    DULoxetine (CYMBALTA) 60 MG capsule Take 1 capsule (60 mg total) by mouth once daily.    losartan-hydrochlorothiazide 100-25 mg (HYZAAR) 100-25 mg per tablet Take 1 tablet by mouth once daily.    traZODone (DESYREL) 50 MG tablet Take 1 tablet (50 mg total) by mouth every evening.     Family History     Problem Relation (Age of Onset)    Hypertension Mother        Tobacco Use    Smoking status: Former Smoker     Years: 8.00     Types: Cigarettes, Cigars    Smokeless tobacco: Never Used    Tobacco comment: black & mals   Substance and Sexual Activity    Alcohol use: Never     Alcohol/week: 1.0 standard drinks     Types: 1 Glasses of wine per week     Frequency: Never    Drug use: Yes     Types: Marijuana     Comment: at first denies but then says that she last smoked a couple of days ago    Sexual activity: Not Currently     Review of Systems   Constitutional: Positive for activity change, appetite change and fatigue.   HENT: Negative.    Eyes: Negative.    Respiratory: Positive for shortness of breath.    Cardiovascular: Positive for chest pain and leg swelling.   Gastrointestinal: Positive for abdominal pain and nausea.   Endocrine: Negative.    Genitourinary: Positive for vaginal bleeding.   Musculoskeletal: Positive for gait problem.   Skin: Negative.    Neurological: Positive for weakness.   Psychiatric/Behavioral: Positive for dysphoric mood.     Objective:     Vital Signs (Most Recent):  Temp: 98.6 °F (37 °C) (02/26/20 1115)  Pulse: (!) 117 (02/26/20 1332)  Resp: (!) 24 (02/26/20 1332)  BP:  115/63 (02/26/20 1115)  SpO2: (!) 93 % (02/26/20 1332) Vital Signs (24h Range):  Temp:  [98.4 °F (36.9 °C)-98.9 °F (37.2 °C)] 98.6 °F (37 °C)  Pulse:  [115-137] 117  Resp:  [18-26] 24  SpO2:  [93 %-97 %] 93 %  BP: (115-145)/(63-70) 115/63     Weight: (!) 208.5 kg (459 lb 10.6 oz)  Body mass index is 55.95 kg/m².    Physical Exam   Constitutional: She is oriented to person, place, and time. She appears well-developed and well-nourished. She appears distressed.   Super morbid obese   HENT:   Head: Normocephalic and atraumatic.   Mouth/Throat: Oropharynx is clear and moist.   Eyes: Pupils are equal, round, and reactive to light. EOM are normal.   Neck: Normal range of motion. Neck supple. No JVD present.   Cardiovascular: Regular rhythm and intact distal pulses.   Tachy s1 s2   Pulmonary/Chest: She has no wheezes. She has no rales.   Increased effort    Abdominal: She exhibits distension. There is tenderness. There is guarding.   Musculoskeletal: Normal range of motion. She exhibits edema.   Neurological: She is alert and oriented to person, place, and time.   Skin: Skin is warm. Capillary refill takes 2 to 3 seconds. She is diaphoretic.   Psychiatric:   Flat affect       Significant Labs:   BMP:   Recent Labs   Lab 02/26/20  0502   *   *   K 3.1*      CO2 23   BUN 19   CREATININE 1.0   CALCIUM 8.7     CBC:   Recent Labs   Lab 02/25/20  0435 02/26/20  0502   WBC 7.06 8.21   HGB 9.1* 8.3*   HCT 29.6* 27.9*    223     Cardiac Markers: No results for input(s): CKMB, MYOGLOBIN, BNP, TROPISTAT in the last 48 hours.  Coagulation: No results for input(s): PT, INR, APTT in the last 48 hours.  POCT Glucose: No results for input(s): POCTGLUCOSE in the last 48 hours.  Troponin: No results for input(s): TROPONINI in the last 48 hours.  TSH: No results for input(s): TSH in the last 4320 hours.  Urine Studies: No results for input(s): COLORU, APPEARANCEUA, PHUR, SPECGRAV, PROTEINUA, GLUCUA, KETONESU,  BILIRUBINUA, OCCULTUA, NITRITE, UROBILINOGEN, LEUKOCYTESUR, RBCUA, WBCUA, BACTERIA, SQUAMEPITHEL, HYALINECASTS in the last 48 hours.    Invalid input(s): HOLLI    Significant Imaging: I have reviewed and interpreted all pertinent imaging results/findings within the past 24 hours.    Assessment/Plan:     * Large bowel obstruction  S/p partial colectomy with anastomosis.   - per primary       Paroxysmal tachycardia  Concern for PE - h/o DVT and has been off eliquis  DVT study negative  Chest CTA pending  ECHo pending  TSH pending  Possibly pain induced - pt reports not controlled  Anxiety - resume home meds    Trial low dose IV metoprolol if HR >115 and BP can tolerate          Abdominal pain    See above    Major depressive disorder, recurrent episode, severe  Resume home medications      Anemia of chronic disease  In setting of colon mass  Type and screen blood  Transfuse if h/h continues to drop         VTE Risk Mitigation (From admission, onward)         Ordered     heparin (porcine) injection 5,000 Units  Every 8 hours      02/25/20 1116     IP VTE HIGH RISK PATIENT  Once      02/21/20 0558     Place FLETCHER hose  Until discontinued      02/21/20 0558     Place sequential compression device  Until discontinued      02/21/20 0558                    Thank you for your consult. I will follow-up with patient. Please contact us if you have any additional questions.    Charmaine Campbell MD  Department of Hospital Medicine   [facility]

## 2020-02-26 NOTE — HPI
38yo F with two months history of cramping pain and poor appetite found to have large bowel obstruction on CT with obstructing colon mass. Underwent c/scope and partial colectomy with anastomosis 2/23.  Path: high dysplasia concerning for adenocarcinoma. Hospital medicine consulted for tachycardia. Pt has h/o PE and has been off eliquis for awhile.     DVT study negative  Chest CTA pending   Pt having BMs, advanced diet    D/w primary team, Dr. Liang- pt has some vaginal vs rectal bleeding - will proceed with anticoagulation if positive study

## 2020-02-26 NOTE — SUBJECTIVE & OBJECTIVE
Past Medical History:   Diagnosis Date    Arthritis     DVT (deep venous thrombosis)     Hypertension        Past Surgical History:   Procedure Laterality Date     SECTION, LOW TRANSVERSE  2010    COLECTOMY N/A 2020    Procedure: COLECTOMY;  Surgeon: Sky Patel MD;  Location: [facility] OR;  Service: General;  Laterality: N/A;    FLEXIBLE SIGMOIDOSCOPY N/A 2020    Procedure: SIGMOIDOSCOPY, FLEXIBLE;  Surgeon: Ez Bartlett MD;  Location: [facility] ENDO;  Service: Endoscopy;  Laterality: N/A;    TUBAL LIGATION             Review of patient's allergies indicates:  No Known Allergies    No current facility-administered medications on file prior to encounter.      Current Outpatient Medications on File Prior to Encounter   Medication Sig    docusate sodium (COLACE) 100 MG capsule Take 1 capsule (100 mg total) by mouth 2 (two) times daily.    losartan (COZAAR) 100 MG tablet Take 1 tablet (100 mg total) by mouth once daily.    acetaminophen (TYLENOL) 325 MG tablet Take 2 tablets (650 mg total) by mouth every 6 (six) hours as needed.    apixaban (ELIQUIS) 2.5 mg Tab Take 2.5 mg by mouth 2 (two) times daily.    DULoxetine (CYMBALTA) 60 MG capsule Take 1 capsule (60 mg total) by mouth once daily.    losartan-hydrochlorothiazide 100-25 mg (HYZAAR) 100-25 mg per tablet Take 1 tablet by mouth once daily.    traZODone (DESYREL) 50 MG tablet Take 1 tablet (50 mg total) by mouth every evening.     Family History     Problem Relation (Age of Onset)    Hypertension Mother        Tobacco Use    Smoking status: Former Smoker     Years: 8.00     Types: Cigarettes, Cigars    Smokeless tobacco: Never Used    Tobacco comment: black & mals   Substance and Sexual Activity    Alcohol use: Never     Alcohol/week: 1.0 standard drinks     Types: 1 Glasses of wine per week     Frequency: Never    Drug use: Yes     Types: Marijuana     Comment: at first denies but then says that she last smoked a couple of  days ago    Sexual activity: Not Currently     Review of Systems   Constitutional: Positive for activity change, appetite change and fatigue.   HENT: Negative.    Eyes: Negative.    Respiratory: Positive for shortness of breath.    Cardiovascular: Positive for chest pain and leg swelling.   Gastrointestinal: Positive for abdominal pain and nausea.   Endocrine: Negative.    Genitourinary: Positive for vaginal bleeding.   Musculoskeletal: Positive for gait problem.   Skin: Negative.    Neurological: Positive for weakness.   Psychiatric/Behavioral: Positive for dysphoric mood.     Objective:     Vital Signs (Most Recent):  Temp: 98.6 °F (37 °C) (02/26/20 1115)  Pulse: (!) 117 (02/26/20 1332)  Resp: (!) 24 (02/26/20 1332)  BP: 115/63 (02/26/20 1115)  SpO2: (!) 93 % (02/26/20 1332) Vital Signs (24h Range):  Temp:  [98.4 °F (36.9 °C)-98.9 °F (37.2 °C)] 98.6 °F (37 °C)  Pulse:  [115-137] 117  Resp:  [18-26] 24  SpO2:  [93 %-97 %] 93 %  BP: (115-145)/(63-70) 115/63     Weight: (!) 208.5 kg (459 lb 10.6 oz)  Body mass index is 55.95 kg/m².    Physical Exam   Constitutional: She is oriented to person, place, and time. She appears well-developed and well-nourished. She appears distressed.   Super morbid obese   HENT:   Head: Normocephalic and atraumatic.   Mouth/Throat: Oropharynx is clear and moist.   Eyes: Pupils are equal, round, and reactive to light. EOM are normal.   Neck: Normal range of motion. Neck supple. No JVD present.   Cardiovascular: Regular rhythm and intact distal pulses.   Tachy s1 s2   Pulmonary/Chest: She has no wheezes. She has no rales.   Increased effort    Abdominal: She exhibits distension. There is tenderness. There is guarding.   Musculoskeletal: Normal range of motion. She exhibits edema.   Neurological: She is alert and oriented to person, place, and time.   Skin: Skin is warm. Capillary refill takes 2 to 3 seconds. She is diaphoretic.   Psychiatric:   Flat affect       Significant Labs:   BMP:    Recent Labs   Lab 02/26/20  0502   *   *   K 3.1*      CO2 23   BUN 19   CREATININE 1.0   CALCIUM 8.7     CBC:   Recent Labs   Lab 02/25/20  0435 02/26/20  0502   WBC 7.06 8.21   HGB 9.1* 8.3*   HCT 29.6* 27.9*    223     Cardiac Markers: No results for input(s): CKMB, MYOGLOBIN, BNP, TROPISTAT in the last 48 hours.  Coagulation: No results for input(s): PT, INR, APTT in the last 48 hours.  POCT Glucose: No results for input(s): POCTGLUCOSE in the last 48 hours.  Troponin: No results for input(s): TROPONINI in the last 48 hours.  TSH: No results for input(s): TSH in the last 4320 hours.  Urine Studies: No results for input(s): COLORU, APPEARANCEUA, PHUR, SPECGRAV, PROTEINUA, GLUCUA, KETONESU, BILIRUBINUA, OCCULTUA, NITRITE, UROBILINOGEN, LEUKOCYTESUR, RBCUA, WBCUA, BACTERIA, SQUAMEPITHEL, HYALINECASTS in the last 48 hours.    Invalid input(s): HOLLI    Significant Imaging: I have reviewed and interpreted all pertinent imaging results/findings within the past 24 hours.

## 2020-02-26 NOTE — PT/OT/SLP PROGRESS
Physical Therapy      Patient Name:  Maryam Oneal   MRN:  [id number]    Patient not seen today secondary to pending venous doppler u/s to w/u for DVT (LE edema, h/o DVT, tachycardia). Will f/u tomorrow.     Kelsi Elizabeth, PT

## 2020-02-26 NOTE — PLAN OF CARE
Problem: Adult Inpatient Plan of Care  Goal: Plan of Care Review  Outcome: Ongoing, Progressing  Goal: Patient-Specific Goal (Individualization)  Outcome: Ongoing, Progressing  Goal: Absence of Hospital-Acquired Illness or Injury  Outcome: Ongoing, Progressing  Goal: Optimal Comfort and Wellbeing  Outcome: Ongoing, Progressing  Goal: Readiness for Transition of Care  Outcome: Ongoing, Progressing  Goal: Rounds/Family Conference  Outcome: Ongoing, Progressing   Patient is encouraged to ambulate.  She participated in PT/OT.  Still tachycardic, IS is encouraged

## 2020-02-26 NOTE — PT/OT/SLP PROGRESS
Occupational Therapy   Treatment    Name: Maryam Oneal  MRN: [id number]  Admitting Diagnosis:  Large bowel obstruction  3 Days Post-Op    Recommendations:     Discharge Recommendations: home(with family assist)  Discharge Equipment Recommendations:  none  Barriers to discharge:  None    Assessment:     Maryam Oneal is a 39 y.o. female with a medical diagnosis of Large bowel obstruction. Performance deficits affecting function are weakness, impaired endurance, impaired self care skills, impaired functional mobilty, gait instability, impaired balance, decreased safety awareness, impaired cardiopulmonary response to activity, edema, impaired skin.   The patient required mod assist to roll left <>right and to sit on the EOB. The patient was lethargic after receiving pain meds and required encouragement to participate in OT.  [place] [facility] [place] 2 [place] with nursing assistance.          Rehab Prognosis:  Good; patient would benefit from acute skilled OT services to address these deficits and reach maximum level of function.       Plan:     Patient to be seen 3 x/week to address the above listed problems via self-care/home management, therapeutic activities, therapeutic exercises  · Plan of Care Expires: 03/10/20  · Plan of Care Reviewed with: patient, mother    Subjective     Pain/Comfort:  · Pain Rating 1: 5/10  · Location 1: abdomen  · Pain Addressed 1: Pre-medicate for activity, Nurse notified, Reposition, Cessation of Activity    Objective:     Communicated with: Nataliia vallecillo prior to session.  Patient found HOB elevated with telemetry, peripheral IV, oxygen upon OT entry to room.    General Precautions: Standard, fall(Abdominal incision)   Orthopedic Precautions:N/A   Braces: N/A     Occupational Performance:     Bed Mobility:    · Patient completed Rolling/Turning to Left with  moderate assistance and with side rail  · Patient completed Rolling/Turning to Right with  moderate assistance and with side rail  · Patient completed Scooting/Bridging with stand by assistance  · Patient completed Supine to Sit with minimum assistance, moderate assistance, with side rail, with leg lift and HOB elevated     Functional Mobility/Transfers:  · Patient completed Sit <> Stand Transfer with contact guard assistance  with  rolling walker   · Patient completed Toilet Transfer Step Transfer technique with stand by assistance with  rolling walker and bedside commode  · Functional Mobility: The patient was able to amb using a RW from the BSC>sink>bed with SBA. The patient tolerated standing at the sink ~10 min for grooming tasks with the patient resting on her elbows on the RW/sink surface while washing her face,     Activities of Daily Living:  · Grooming: supervision and stand by assistance to stand at the sink to wash her hands and face and brush her teeth  · Upper Body Dressing: contact guard assistance to don her back gown  · Lower Body Dressing: maximal assistance to don slides while seated on the EOB  · Toileting: dependence for pericare in bed and while standing with a RW (The nurse was notified of presence of blood while wiping)      [facility] 6 Click ADL: 17    Treatment & Education:  The patient participated in bed mobility, self care, and functional mobility as noted above. The patient and mother were educated nre: log roll via verbal and handout. The patient was educated re: the benefit of sitting on the SILVERIO 30-60 min/3x day and [place] [facility] [place]ting.     Patient left seated on the EOB with all lines intact, call button in reach, nurse, Nataliia notified and mother presentEducation:      GOALS:   Multidisciplinary Problems     Occupational Therapy Goals        Problem: Occupational Therapy Goal    Goal Priority Disciplines Outcome Interventions   Occupational Therapy Goal     OT, PT/OT Ongoing, Progressing    Description:  Goals to be met by: 3/10/20    Patient will  increase functional independence with ADLs by performing:    UE Dressing with Modified [place].  LE Dressing with Stand-by Assistance and Assistive Devices as needed.  Grooming while standing at sink with Modified [place] and Supervision.  Toileting from bedside commode with Modified [place] and Supervision for hygiene and clothing management.   Rolling to Bilateral with Modified [place].   Supine to sit with Modified [place].  Step transfer with Modified [place]  Toilet transfer to bedside commode with Modified [place] and Supervision.  Upper extremity exercise program x15 reps per handout, with independence.  Educate the patient re: log roll technique                      Time Tracking:     OT Date of Treatment: 02/26/20  OT Start Time: 1011  OT Stop Time: 1058  OT Total Time (min): 47 min    Billable Minutes:Self Care/Home Management 47    Sophie San OT  2/26/2020

## 2020-02-26 NOTE — NURSING
"Notified Dr Liang patient's mew is 5, pulse 137, resp 24, vaginal bleeding with tiny clots; pt states she has "fibroids " ,  sometimes her period comes down twice a month ,  And she was suppose to have a GYN appointment this month. Patient says she had a "blood clot in the back of left leg in 2017" , says she stop taking Eliquis, denies taking this year and last year, says the doctor did not tell her to stop taking; med on home list. Patient says she takes Losartan-HCTZ for fluid in her legs; med on home list.  Dr Liang placed new orders.   "

## 2020-02-26 NOTE — PLAN OF CARE
Problem: Occupational Therapy Goal  Goal: Occupational Therapy Goal  Description  Goals to be met by: 3/10/20    Patient will increase functional independence with ADLs by performing:    UE Dressing with Modified [place].  LE Dressing with Stand-by Assistance and Assistive Devices as needed.  Grooming while standing at sink with Modified [place] and Supervision.  Toileting from bedside commode with Modified [place] and Supervision for hygiene and clothing management.   Rolling to Bilateral with Modified [place].   Supine to sit with Modified [place].  Step transfer with Modified [place]  Toilet transfer to bedside commode with Modified [place] and Supervision.  Upper extremity exercise program x15 reps per handout, with independence.  Educate the patient re: log roll technique     Outcome: Ongoing, Progressing   The patient required mod assist to roll left <>right and to sit on the EOB. The patient was lethargic after receiving pain meds and required encouragement to participate in OT.  The patient should be encouraged to transfer to the [facility] [place] using the RW q. 2 hours with nursing assistance.

## 2020-02-26 NOTE — ANESTHESIA POSTPROCEDURE EVALUATION
Anesthesia Post Evaluation    Patient: Maryam Oneal    Procedure(s) Performed: Procedure(s) (LRB):  SIGMOIDOSCOPY, FLEXIBLE (N/A)    Final Anesthesia Type: general    Patient location during evaluation: GI PACU  Patient participation: Yes- Able to Participate  Level of consciousness: awake and alert  Post-procedure vital signs: reviewed and stable  Pain management: adequate  Airway patency: patent    PONV status at discharge: No PONV  Anesthetic complications: no      Cardiovascular status: blood pressure returned to baseline and hemodynamically stable  Respiratory status: unassisted and spontaneous ventilation  Hydration status: euvolemic  Follow-up not needed.          Vitals Value Taken Time   /63 2/26/2020 11:15 AM   Temp 37 °C (98.6 °F) 2/26/2020 11:15 AM   Pulse 117 2/26/2020  1:32 PM   Resp 24 2/26/2020  1:32 PM   SpO2 93 % 2/26/2020  1:32 PM         Event Time     Out of Recovery 02/21/2020 15:08:13          Pain/Jose R Score: Pain Rating Prior to Med Admin: 8 (2/26/2020 12:41 PM)  Pain Rating Post Med Admin: 4 (2/26/2020  5:50 AM)

## 2020-02-27 LAB
ALBUMIN SERPL BCP-MCNC: 1.9 G/DL (ref 3.5–5.2)
ALP SERPL-CCNC: 54 U/L (ref 55–135)
ALT SERPL W/O P-5'-P-CCNC: 13 U/L (ref 10–44)
ANION GAP SERPL CALC-SCNC: 10 MMOL/L (ref 8–16)
AST SERPL-CCNC: 17 U/L (ref 10–40)
BACTERIA #/AREA URNS HPF: ABNORMAL /HPF
BASOPHILS # BLD AUTO: 0.05 K/UL (ref 0–0.2)
BASOPHILS NFR BLD: 0.3 % (ref 0–1.9)
BILIRUB SERPL-MCNC: 0.9 MG/DL (ref 0.1–1)
BILIRUB UR QL STRIP: NEGATIVE
BUN SERPL-MCNC: 19 MG/DL (ref 6–20)
CALCIUM SERPL-MCNC: 8.4 MG/DL (ref 8.7–10.5)
CHLORIDE SERPL-SCNC: 104 MMOL/L (ref 95–110)
CLARITY UR: ABNORMAL
CO2 SERPL-SCNC: 21 MMOL/L (ref 23–29)
COLOR UR: ABNORMAL
CREAT SERPL-MCNC: 2 MG/DL (ref 0.5–1.4)
DIFFERENTIAL METHOD: ABNORMAL
EOSINOPHIL # BLD AUTO: 0 K/UL (ref 0–0.5)
EOSINOPHIL NFR BLD: 0.1 % (ref 0–8)
ERYTHROCYTE [DISTWIDTH] IN BLOOD BY AUTOMATED COUNT: 16.3 % (ref 11.5–14.5)
EST. GFR  (AFRICAN AMERICAN): 36 ML/MIN/1.73 M^2
EST. GFR  (NON AFRICAN AMERICAN): 31 ML/MIN/1.73 M^2
FINAL PATHOLOGIC DIAGNOSIS: NORMAL
GLUCOSE SERPL-MCNC: 122 MG/DL (ref 70–110)
GLUCOSE UR QL STRIP: NEGATIVE
GROSS: NORMAL
HCT VFR BLD AUTO: 29 % (ref 37–48.5)
HGB BLD-MCNC: 9.2 G/DL (ref 12–16)
HGB UR QL STRIP: ABNORMAL
HYALINE CASTS #/AREA URNS LPF: 0 /LPF
IMM GRANULOCYTES # BLD AUTO: 0.3 K/UL (ref 0–0.04)
IMM GRANULOCYTES NFR BLD AUTO: 1.7 % (ref 0–0.5)
KETONES UR QL STRIP: NEGATIVE
LACTATE SERPL-SCNC: 1.5 MMOL/L (ref 0.5–2.2)
LEUKOCYTE ESTERASE UR QL STRIP: ABNORMAL
LYMPHOCYTES # BLD AUTO: 1.4 K/UL (ref 1–4.8)
LYMPHOCYTES NFR BLD: 7.7 % (ref 18–48)
MCH RBC QN AUTO: 25.8 PG (ref 27–31)
MCHC RBC AUTO-ENTMCNC: 31.7 G/DL (ref 32–36)
MCV RBC AUTO: 81 FL (ref 82–98)
MICROSCOPIC COMMENT: ABNORMAL
MICROSCOPIC EXAM: NORMAL
MONOCYTES # BLD AUTO: 1.6 K/UL (ref 0.3–1)
MONOCYTES NFR BLD: 8.8 % (ref 4–15)
NEUTROPHILS # BLD AUTO: 14.7 K/UL (ref 1.8–7.7)
NEUTROPHILS NFR BLD: 81.4 % (ref 38–73)
NITRITE UR QL STRIP: NEGATIVE
NRBC BLD-RTO: 1 /100 WBC
PH UR STRIP: 5 [PH] (ref 5–8)
PLATELET # BLD AUTO: 259 K/UL (ref 150–350)
PLATELET BLD QL SMEAR: ABNORMAL
PMV BLD AUTO: 11.6 FL (ref 9.2–12.9)
POTASSIUM SERPL-SCNC: 3.1 MMOL/L (ref 3.5–5.1)
PROT SERPL-MCNC: 6 G/DL (ref 6–8.4)
PROT UR QL STRIP: ABNORMAL
RBC # BLD AUTO: 3.57 M/UL (ref 4–5.4)
RBC #/AREA URNS HPF: >100 /HPF (ref 0–4)
SODIUM SERPL-SCNC: 135 MMOL/L (ref 136–145)
SP GR UR STRIP: >1.03 (ref 1–1.03)
SQUAMOUS #/AREA URNS HPF: 10 /HPF
URN SPEC COLLECT METH UR: ABNORMAL
UROBILINOGEN UR STRIP-ACNC: NEGATIVE EU/DL
WBC # BLD AUTO: 18 K/UL (ref 3.9–12.7)
WBC #/AREA URNS HPF: >100 /HPF (ref 0–5)
WBC TOXIC VACUOLES BLD QL SMEAR: PRESENT
YEAST URNS QL MICRO: ABNORMAL

## 2020-02-27 PROCEDURE — 25000003 PHARM REV CODE 250: Performed by: SURGERY

## 2020-02-27 PROCEDURE — 63600175 PHARM REV CODE 636 W HCPCS: Performed by: STUDENT IN AN ORGANIZED HEALTH CARE EDUCATION/TRAINING PROGRAM

## 2020-02-27 PROCEDURE — 87088 URINE BACTERIA CULTURE: CPT

## 2020-02-27 PROCEDURE — 85025 COMPLETE CBC W/AUTO DIFF WBC: CPT

## 2020-02-27 PROCEDURE — 87077 CULTURE AEROBIC IDENTIFY: CPT

## 2020-02-27 PROCEDURE — P9016 RBC LEUKOCYTES REDUCED: HCPCS

## 2020-02-27 PROCEDURE — 87186 SC STD MICRODIL/AGAR DIL: CPT | Mod: 59

## 2020-02-27 PROCEDURE — 81000 URINALYSIS NONAUTO W/SCOPE: CPT

## 2020-02-27 PROCEDURE — C9113 INJ PANTOPRAZOLE SODIUM, VIA: HCPCS | Performed by: SURGERY

## 2020-02-27 PROCEDURE — 83605 ASSAY OF LACTIC ACID: CPT

## 2020-02-27 PROCEDURE — 63600175 PHARM REV CODE 636 W HCPCS

## 2020-02-27 PROCEDURE — 25000003 PHARM REV CODE 250: Performed by: STUDENT IN AN ORGANIZED HEALTH CARE EDUCATION/TRAINING PROGRAM

## 2020-02-27 PROCEDURE — 36415 COLL VENOUS BLD VENIPUNCTURE: CPT

## 2020-02-27 PROCEDURE — 25500020 PHARM REV CODE 255: Performed by: SURGERY

## 2020-02-27 PROCEDURE — 11000001 HC ACUTE MED/SURG PRIVATE ROOM

## 2020-02-27 PROCEDURE — 63600175 PHARM REV CODE 636 W HCPCS: Performed by: SURGERY

## 2020-02-27 PROCEDURE — 87086 URINE CULTURE/COLONY COUNT: CPT

## 2020-02-27 PROCEDURE — 80053 COMPREHEN METABOLIC PANEL: CPT

## 2020-02-27 RX ORDER — SODIUM CHLORIDE, SODIUM LACTATE, POTASSIUM CHLORIDE, CALCIUM CHLORIDE 600; 310; 30; 20 MG/100ML; MG/100ML; MG/100ML; MG/100ML
INJECTION, SOLUTION INTRAVENOUS CONTINUOUS
Status: DISCONTINUED | OUTPATIENT
Start: 2020-02-27 | End: 2020-02-28

## 2020-02-27 RX ORDER — HYDROCODONE BITARTRATE AND ACETAMINOPHEN 500; 5 MG/1; MG/1
TABLET ORAL
Status: DISCONTINUED | OUTPATIENT
Start: 2020-02-27 | End: 2020-03-02 | Stop reason: HOSPADM

## 2020-02-27 RX ADMIN — MUPIROCIN 1 G: 20 OINTMENT TOPICAL at 09:02

## 2020-02-27 RX ADMIN — ALVIMOPAN 12 MG: 12 CAPSULE ORAL at 09:02

## 2020-02-27 RX ADMIN — PANTOPRAZOLE SODIUM 40 MG: 40 INJECTION, POWDER, FOR SOLUTION INTRAVENOUS at 09:02

## 2020-02-27 RX ADMIN — PIPERACILLIN AND TAZOBACTAM 4.5 G: 4; .5 INJECTION, POWDER, LYOPHILIZED, FOR SOLUTION INTRAVENOUS; PARENTERAL at 05:02

## 2020-02-27 RX ADMIN — OXYCODONE HYDROCHLORIDE AND ACETAMINOPHEN 1 TABLET: 10; 325 TABLET ORAL at 12:02

## 2020-02-27 RX ADMIN — PIPERACILLIN AND TAZOBACTAM 4.5 G: 4; .5 INJECTION, POWDER, LYOPHILIZED, FOR SOLUTION INTRAVENOUS; PARENTERAL at 04:02

## 2020-02-27 RX ADMIN — HEPARIN SODIUM 5000 UNITS: 5000 INJECTION, SOLUTION INTRAVENOUS; SUBCUTANEOUS at 04:02

## 2020-02-27 RX ADMIN — METOPROLOL TARTRATE 2.5 MG: 5 INJECTION INTRAVENOUS at 03:02

## 2020-02-27 RX ADMIN — TRAZODONE HYDROCHLORIDE 50 MG: 50 TABLET ORAL at 09:02

## 2020-02-27 RX ADMIN — HEPARIN SODIUM 5000 UNITS: 5000 INJECTION, SOLUTION INTRAVENOUS; SUBCUTANEOUS at 09:02

## 2020-02-27 RX ADMIN — SODIUM CHLORIDE: 0.9 INJECTION, SOLUTION INTRAVENOUS at 05:02

## 2020-02-27 RX ADMIN — PIPERACILLIN AND TAZOBACTAM 4.5 G: 4; .5 INJECTION, POWDER, LYOPHILIZED, FOR SOLUTION INTRAVENOUS; PARENTERAL at 09:02

## 2020-02-27 RX ADMIN — ACETAMINOPHEN 650 MG: 325 TABLET ORAL at 05:02

## 2020-02-27 RX ADMIN — SODIUM CHLORIDE, SODIUM LACTATE, POTASSIUM CHLORIDE, AND CALCIUM CHLORIDE 1000 ML: .6; .31; .03; .02 INJECTION, SOLUTION INTRAVENOUS at 10:02

## 2020-02-27 RX ADMIN — IOHEXOL 30 ML: 300 INJECTION, SOLUTION INTRAVENOUS at 10:02

## 2020-02-27 RX ADMIN — SODIUM CHLORIDE, SODIUM LACTATE, POTASSIUM CHLORIDE, AND CALCIUM CHLORIDE: .6; .31; .03; .02 INJECTION, SOLUTION INTRAVENOUS at 12:02

## 2020-02-27 RX ADMIN — ONDANSETRON HYDROCHLORIDE 4 MG: 2 SOLUTION INTRAMUSCULAR; INTRAVENOUS at 09:02

## 2020-02-27 RX ADMIN — HEPARIN SODIUM 5000 UNITS: 5000 INJECTION, SOLUTION INTRAVENOUS; SUBCUTANEOUS at 05:02

## 2020-02-27 NOTE — NURSING
2145- hr at 140  2200- hospitalist and dr Liang ([facility]) notified of       HR and results of CTA  2210- hospitialist ordered 12 lead EKG  2300- EKG showed sinus tach. HR still elevated. hospitalist ordered 5 mg metoprolol IV push  2330- HR at 120 now. Will continue to monitor.

## 2020-02-27 NOTE — PT/OT/SLP PROGRESS
Physical Therapy      Patient Name:  Maryam Onela   MRN:  [id number]    Hold PT today 2/2 tachycardia. D/w Dr. Liang. Will f/u tomorrow.     Kelsi Elizabeth, PT

## 2020-02-27 NOTE — PROGRESS NOTES
1805 TN attempted to meet with pt, was off the floor. TN met with pt's mother, Negra, who stated unsure of where pt will live after discharge due to will need help and help wit pt's 6 children that are at home with pt. Mother stated she is trying to persuade pt to come and live with in [facility]. [place]. TN will f/u up with pt..

## 2020-02-27 NOTE — NURSING
Note was put in on the wrong day. Please disregard. md ordered 5 mg metoprolol IV. After adm hr dropped to 120's. Will continue to monitor.

## 2020-02-27 NOTE — NURSING
Abnormal VS noted, bedside RN, Godwin contacted. MEWS 6 called and spoke to Godwin  About the Mews 6. He is aware of Heart rate 134 and RR 34. He reported that the patient is fine and that he will reach vital signs. He reports that the mother is in the room with the patient. Will monitor  the patient throughout the night.

## 2020-02-27 NOTE — NURSING
Notified Tele/Sirisha that patient is on box 8587 for Metoprolol IV push. Verbalized understanding

## 2020-02-27 NOTE — NURSING
Patient going to CT scan via bed, oxygen 2lnc with transporters. Notified tele, verbalized understanding.  No distress noted

## 2020-02-27 NOTE — PHYSICIAN QUERY
PT Name: Maryam Oneal  MR #: [id number]  Physician Query Form - Renal Condition Clarification   Joan Beatty RN, CCDS; Desk # [phone]; [email or web address]    This form is a permanent document in the medical record.     QueryDate: February 27, 2020    By submitting this query, we are merely seeking further clarification of documentation. Please utilize your independent clinical judgment when addressing the question(s) below.    The Medical record contains the following:   Indicator Supporting Clinical Findings Location in Medical Record    Kidney (Renal) Insufficiency      Kidney (Renal) Failure / Injury      Nephrotoxic Agents     x BUN/  Creatinine   GFR  2/20 2/23/  2/24 2/24 2/24/  2/25 2/26/  2/27    bun 8 10 18 18 18 23 (H) 19 19   Cr  0.8 0.8 1.7 (H) 1.7 (H) 1.7 (H) 1.5 (H) 1.0 2.0 (H)   gfr >60 >60 43 (A) 43 (A) 43 (A) 51 (A) >60 36 (A)    Lab    Urine: Casts         Eosinophils     x Dehydration Tachycardia likely 2/2 volume depletion Reily Sx PN 2/24     x Nausea/  Vomiting Nausea and vomiting  abd pain with Poor appetite, some nausea  Recent onset, weeks, worsening  H&P 2/21  GI CN 2/21    Dialysis/CRRT     x Treatment: -will bolus 1L LR  -Increase MIVF to 150mL/hr    IVF:  NS 1 Liter @ 125 ml/hr in ED  D5% & 0.45% @ 150 ml/hr 2/21 - 26  NS @ 150 ml/hr  2/23 - 24  LR 1 Liter bolus  2/23  LR 1 Liter bolus  2/24  LR 1 Liter bolus  2/27  LR @ 150 ml/hr start 2/27   Reily Sx PN 2/24            MAR     x Other:  POD1 from partial colectomy with anastomosis. ?  Tachycardia likely 2/2 volume depletion    Op Note 2/23 MINNARD  pre/post op dx:                      Large Bowel Obstruction  Findings of the Procedure:   Obstructing L colon cancer no                                                evidence of metastatic disease                                                EBL: minimal    LBM 6 days ago;  Constipation   Paroxysmal tachycardia  NPO   Reily Sx PN 2/24          Op Note        ED MD  Prejeant CN  226  Diet order 2/20     Acute Kidney Injury / Acute Renal Failure has different defining criteria. A generally accepted guideline  is:   A greater than 100% (2X) rise in serum creatinine from baseline* occurring during the course of a single hospital stay.   *Baseline as determined by the providers judgment and consideration of previous lab values and other documentation, if available.  A diagnosis of Acute Kidney Injury/ Acute Renal Failure should incorporate abnormal labs and clinical findings that are clinically significant    References: 1. Arnav et al. Acute renal failure-definition, outcome measures, animal models, fluid therapy and information technology needs: the Second International Consensus Conference of the Acute Dialysis Quality Initiative (ADQI) Group. Crit Care 2004; 8:B204; 2. Jen et al. Acute Kidney Injury Network: report of an initiative to improve outcomes in acute kidney injury. Crit Care 2007; 11:R31; 3. Kidney Disease: Improving Global Outcomes (KDIGO). Acute Kidney Injury Work Group. KDIGO clinical practice guidelines for acute kidney injury. Kidney Int Suppl 2012; 2:1.  The clinical guidelines noted below is only a system guideline, it does not replace the providers clinical judgment.    Provider, please specify the diagnosis or diagnoses associated with above clinical findings.    [   ] Other Acute Kidney Failure/Injury (please specify): ____________     [  x ] Unspecified Acute Kidney Failure/Injury      [   ] Acute Renal Insufficiency  Consider if SCr rise is transient and normalizes quickly with no efforts at real resuscitation of vital signs and perfusion   [   ] Other (please specify): _________________________________   [   ]  Clinically Undetermined     Please document in your progress notes daily for the duration of treatment until resolved and include in your discharge summary.

## 2020-02-27 NOTE — NURSING
Chart check completed, abnormal VS noted, MENG Gomes RN and I went to check on patient for elevated MEWs of 5 (elevated HR & borderline BP)  Primary nurse states tachycardia has been persistent and patient being treated with IV Lopressor, which has been minimally effective in lowering HR, but has lowered BP.  Pt assessed: Abd soft,mild tenderness,  bowel sounds hypoactive, trace peripheral edema noted.  No distress noted, appears comfortable.  Primary nurse states surgeons and hospitalist are aware and considering blood administration.  Will continue to monitor.    08:45  MEWs down to 3

## 2020-02-27 NOTE — PROGRESS NOTES
S:  Flattened affect but answers appropriately.  Vague abdominal pain continues.  Having bowel movements.       O:  Remains tachycardic.  WBC increased to 18.  Creatinine bump to 2.   Abdomen obese, no gaurding/rebound.  Mild tenderness.    Incisions c/d/i without sign of infection  Afebrile.       A/P:  POD4 from partial segmental colectomy and primary anastomosis.  Tolerating PO and having bowel movements.  Remains tachycardic no with increased WBC.  Some air on CT yesterday likely post-surgical as she is having multiple stools and tolerating diet.  However given increased WBC we will obtain CT abdomen with oral contrast to evaluate for leak vs abscess.     -CT scan today with oral contrast--please administer oral contrast with enough time for this to progress to distal colon.    -appreciate hospitalist assistance  -restart MIVF and bolus since NPO for scan  -PT/OT  -ambulate/IS pulm toilet     Joseph Liang MD  [facility] Surgery Resident

## 2020-02-28 LAB
ALBUMIN SERPL BCP-MCNC: 2 G/DL (ref 3.5–5.2)
ALP SERPL-CCNC: 89 U/L (ref 55–135)
ALT SERPL W/O P-5'-P-CCNC: 17 U/L (ref 10–44)
ANION GAP SERPL CALC-SCNC: 14 MMOL/L (ref 8–16)
AST SERPL-CCNC: 27 U/L (ref 10–40)
BASOPHILS # BLD AUTO: ABNORMAL K/UL (ref 0–0.2)
BASOPHILS NFR BLD: 0 % (ref 0–1.9)
BILIRUB SERPL-MCNC: 0.7 MG/DL (ref 0.1–1)
BUN SERPL-MCNC: 28 MG/DL (ref 6–20)
CALCIUM SERPL-MCNC: 9.1 MG/DL (ref 8.7–10.5)
CHLORIDE SERPL-SCNC: 101 MMOL/L (ref 95–110)
CO2 SERPL-SCNC: 21 MMOL/L (ref 23–29)
CREAT SERPL-MCNC: 2.3 MG/DL (ref 0.5–1.4)
DIFFERENTIAL METHOD: ABNORMAL
EOSINOPHIL # BLD AUTO: ABNORMAL K/UL (ref 0–0.5)
EOSINOPHIL NFR BLD: 0 % (ref 0–8)
ERYTHROCYTE [DISTWIDTH] IN BLOOD BY AUTOMATED COUNT: 16.4 % (ref 11.5–14.5)
EST. GFR  (AFRICAN AMERICAN): 30 ML/MIN/1.73 M^2
EST. GFR  (NON AFRICAN AMERICAN): 26 ML/MIN/1.73 M^2
GLUCOSE SERPL-MCNC: 97 MG/DL (ref 70–110)
HCT VFR BLD AUTO: 30.7 % (ref 37–48.5)
HGB BLD-MCNC: 9.4 G/DL (ref 12–16)
IMM GRANULOCYTES # BLD AUTO: ABNORMAL K/UL (ref 0–0.04)
IMM GRANULOCYTES NFR BLD AUTO: ABNORMAL % (ref 0–0.5)
LYMPHOCYTES # BLD AUTO: ABNORMAL K/UL (ref 1–4.8)
LYMPHOCYTES NFR BLD: 4 % (ref 18–48)
MCH RBC QN AUTO: 25.8 PG (ref 27–31)
MCHC RBC AUTO-ENTMCNC: 30.6 G/DL (ref 32–36)
MCV RBC AUTO: 84 FL (ref 82–98)
MONOCYTES # BLD AUTO: ABNORMAL K/UL (ref 0.3–1)
MONOCYTES NFR BLD: 8 % (ref 4–15)
MYELOCYTES NFR BLD MANUAL: 2 %
NEUTROPHILS NFR BLD: 62 % (ref 38–73)
NEUTS BAND NFR BLD MANUAL: 24 %
NRBC BLD-RTO: 0 /100 WBC
PLATELET # BLD AUTO: 225 K/UL (ref 150–350)
PMV BLD AUTO: 11 FL (ref 9.2–12.9)
POTASSIUM SERPL-SCNC: 2.9 MMOL/L (ref 3.5–5.1)
PROT SERPL-MCNC: 6.7 G/DL (ref 6–8.4)
RBC # BLD AUTO: 3.64 M/UL (ref 4–5.4)
SODIUM SERPL-SCNC: 136 MMOL/L (ref 136–145)
WBC # BLD AUTO: 19.75 K/UL (ref 3.9–12.7)

## 2020-02-28 PROCEDURE — 25000242 PHARM REV CODE 250 ALT 637 W/ HCPCS: Performed by: HOSPITALIST

## 2020-02-28 PROCEDURE — 25000003 PHARM REV CODE 250

## 2020-02-28 PROCEDURE — 25000003 PHARM REV CODE 250: Performed by: SURGERY

## 2020-02-28 PROCEDURE — 36415 COLL VENOUS BLD VENIPUNCTURE: CPT

## 2020-02-28 PROCEDURE — 63600175 PHARM REV CODE 636 W HCPCS: Performed by: SURGERY

## 2020-02-28 PROCEDURE — 85027 COMPLETE CBC AUTOMATED: CPT

## 2020-02-28 PROCEDURE — 94640 AIRWAY INHALATION TREATMENT: CPT

## 2020-02-28 PROCEDURE — S0030 INJECTION, METRONIDAZOLE: HCPCS | Performed by: SURGERY

## 2020-02-28 PROCEDURE — 99900035 HC TECH TIME PER 15 MIN (STAT)

## 2020-02-28 PROCEDURE — C9113 INJ PANTOPRAZOLE SODIUM, VIA: HCPCS | Performed by: SURGERY

## 2020-02-28 PROCEDURE — 63600175 PHARM REV CODE 636 W HCPCS

## 2020-02-28 PROCEDURE — 97110 THERAPEUTIC EXERCISES: CPT | Mod: CQ

## 2020-02-28 PROCEDURE — 85007 BL SMEAR W/DIFF WBC COUNT: CPT

## 2020-02-28 PROCEDURE — 97530 THERAPEUTIC ACTIVITIES: CPT | Mod: CQ

## 2020-02-28 PROCEDURE — 94761 N-INVAS EAR/PLS OXIMETRY MLT: CPT

## 2020-02-28 PROCEDURE — 94664 DEMO&/EVAL PT USE INHALER: CPT

## 2020-02-28 PROCEDURE — 25000003 PHARM REV CODE 250: Performed by: STUDENT IN AN ORGANIZED HEALTH CARE EDUCATION/TRAINING PROGRAM

## 2020-02-28 PROCEDURE — 63600175 PHARM REV CODE 636 W HCPCS: Performed by: STUDENT IN AN ORGANIZED HEALTH CARE EDUCATION/TRAINING PROGRAM

## 2020-02-28 PROCEDURE — 11000001 HC ACUTE MED/SURG PRIVATE ROOM

## 2020-02-28 PROCEDURE — 27000646 HC AEROBIKA DEVICE

## 2020-02-28 PROCEDURE — 27000221 HC OXYGEN, UP TO 24 HOURS

## 2020-02-28 PROCEDURE — 80053 COMPREHEN METABOLIC PANEL: CPT

## 2020-02-28 RX ORDER — DEXTROSE MONOHYDRATE, SODIUM CHLORIDE, AND POTASSIUM CHLORIDE 50; 1.49; 2.25 G/1000ML; G/1000ML; G/1000ML
INJECTION, SOLUTION INTRAVENOUS CONTINUOUS
Status: DISCONTINUED | OUTPATIENT
Start: 2020-02-28 | End: 2020-03-02 | Stop reason: HOSPADM

## 2020-02-28 RX ORDER — LEVALBUTEROL INHALATION SOLUTION 0.63 MG/3ML
0.63 SOLUTION RESPIRATORY (INHALATION) EVERY 8 HOURS
Status: DISCONTINUED | OUTPATIENT
Start: 2020-02-28 | End: 2020-03-02 | Stop reason: HOSPADM

## 2020-02-28 RX ORDER — CIPROFLOXACIN 2 MG/ML
400 INJECTION, SOLUTION INTRAVENOUS
Status: DISCONTINUED | OUTPATIENT
Start: 2020-02-28 | End: 2020-03-02 | Stop reason: HOSPADM

## 2020-02-28 RX ORDER — POTASSIUM CHLORIDE 7.45 MG/ML
10 INJECTION INTRAVENOUS
Status: COMPLETED | OUTPATIENT
Start: 2020-02-28 | End: 2020-02-28

## 2020-02-28 RX ORDER — METRONIDAZOLE 500 MG/100ML
500 INJECTION, SOLUTION INTRAVENOUS
Status: DISCONTINUED | OUTPATIENT
Start: 2020-02-28 | End: 2020-03-01

## 2020-02-28 RX ADMIN — LEVALBUTEROL HYDROCHLORIDE 0.63 MG: 0.63 SOLUTION RESPIRATORY (INHALATION) at 11:02

## 2020-02-28 RX ADMIN — ALVIMOPAN 12 MG: 12 CAPSULE ORAL at 09:02

## 2020-02-28 RX ADMIN — HEPARIN SODIUM 5000 UNITS: 5000 INJECTION, SOLUTION INTRAVENOUS; SUBCUTANEOUS at 03:02

## 2020-02-28 RX ADMIN — POTASSIUM CHLORIDE 10 MEQ: 7.46 INJECTION, SOLUTION INTRAVENOUS at 12:02

## 2020-02-28 RX ADMIN — HEPARIN SODIUM 5000 UNITS: 5000 INJECTION, SOLUTION INTRAVENOUS; SUBCUTANEOUS at 05:02

## 2020-02-28 RX ADMIN — OXYCODONE HYDROCHLORIDE AND ACETAMINOPHEN 1 TABLET: 5; 325 TABLET ORAL at 04:02

## 2020-02-28 RX ADMIN — PIPERACILLIN AND TAZOBACTAM 4.5 G: 4; .5 INJECTION, POWDER, LYOPHILIZED, FOR SOLUTION INTRAVENOUS; PARENTERAL at 05:02

## 2020-02-28 RX ADMIN — HEPARIN SODIUM 5000 UNITS: 5000 INJECTION, SOLUTION INTRAVENOUS; SUBCUTANEOUS at 09:02

## 2020-02-28 RX ADMIN — DEXTROSE MONOHYDRATE, SODIUM CHLORIDE, AND POTASSIUM CHLORIDE: 50; 2.25; 1.49 INJECTION, SOLUTION INTRAVENOUS at 05:02

## 2020-02-28 RX ADMIN — HYDROMORPHONE HYDROCHLORIDE 0.5 MG: 2 INJECTION, SOLUTION INTRAMUSCULAR; INTRAVENOUS; SUBCUTANEOUS at 09:02

## 2020-02-28 RX ADMIN — POTASSIUM CHLORIDE 10 MEQ: 7.46 INJECTION, SOLUTION INTRAVENOUS at 10:02

## 2020-02-28 RX ADMIN — DULOXETINE HYDROCHLORIDE 60 MG: 30 CAPSULE, DELAYED RELEASE ORAL at 09:02

## 2020-02-28 RX ADMIN — SODIUM CHLORIDE, SODIUM LACTATE, POTASSIUM CHLORIDE, AND CALCIUM CHLORIDE 500 ML: .6; .31; .03; .02 INJECTION, SOLUTION INTRAVENOUS at 01:02

## 2020-02-28 RX ADMIN — OXYCODONE HYDROCHLORIDE AND ACETAMINOPHEN 1 TABLET: 10; 325 TABLET ORAL at 07:02

## 2020-02-28 RX ADMIN — METRONIDAZOLE 500 MG: 500 INJECTION, SOLUTION INTRAVENOUS at 06:02

## 2020-02-28 RX ADMIN — CIPROFLOXACIN 400 MG: 2 INJECTION, SOLUTION INTRAVENOUS at 12:02

## 2020-02-28 RX ADMIN — METRONIDAZOLE 500 MG: 500 INJECTION, SOLUTION INTRAVENOUS at 10:02

## 2020-02-28 RX ADMIN — POTASSIUM CHLORIDE 10 MEQ: 7.46 INJECTION, SOLUTION INTRAVENOUS at 11:02

## 2020-02-28 RX ADMIN — PANTOPRAZOLE SODIUM 40 MG: 40 INJECTION, POWDER, FOR SOLUTION INTRAVENOUS at 08:02

## 2020-02-28 RX ADMIN — LEVALBUTEROL HYDROCHLORIDE 0.63 MG: 0.63 SOLUTION RESPIRATORY (INHALATION) at 05:02

## 2020-02-28 NOTE — PLAN OF CARE
Pt alert able to make needs known,alicia meds well,iv abx remains in progress,no s/s adverse reaction  noted,abdominal incision intact with staples,no redness,swelling,or drainage noted,iv hydration in progress,safety maintained,continue monitoring.

## 2020-02-28 NOTE — NURSING
Clarified with Dr Albright to keep patient NPO until he gets the results of the CT scan; he will place further orders.

## 2020-02-28 NOTE — PROGRESS NOTES
"Called to room for "linen change." Upon arrival to room, pt covered in large amount of vomitus. Assisted to bedside commode x 2 assist and a walker. Complete linen change, and perineal care provided. Pt states she was no longer nauseated after returning to bed.  "

## 2020-02-28 NOTE — PT/OT/SLP PROGRESS
"Physical Therapy Treatment    Patient Name:  Maryam Oneal   MRN:  [id number]    Recommendations:     Discharge Recommendations:  home   Discharge Equipment Recommendations: none   Barriers to discharge: None    Assessment:     Maryam Oneal is a 39 y.o. female admitted with a medical diagnosis of Large bowel obstruction.  She presents with the following impairments/functional limitations:  weakness, gait instability, decreased upper extremity function, impaired cardiopulmonary response to activity, impaired endurance, impaired balance, decreased lower extremity function, decreased safety awareness, impaired self care skills, pain, impaired functional mobilty, edema, impaired skin  Pt very self limiting. Pt kept eyes closed throughout tx and move very slowly.  Pt needed vcs for proper technique of therex.  Pt's mother assisted pt sit to stand from chair with a bear hug. "This is how I do at home."    Rehab Prognosis: Fair; patient would benefit from acute skilled PT services to address these deficits and reach maximum level of function.    Recent Surgery: Procedure(s) (LRB):  COLECTOMY, LAPAROSCOPIC, attempted (N/A)  COLECTOMY (N/A) 5 Days Post-Op    Plan:     During this hospitalization, patient to be seen 3 x/week to address the identified rehab impairments via gait training and progress toward the following goals:    · Plan of Care Expires:  02/28/20    Subjective     Chief Complaint: pain  Patient/Family Comments/goals: "I told them percocet doesn't work."  Pain/Comfort:  · Pain Rating 1: 5/10  · Location - Side 1: Left  · Location - Orientation 1: upper  · Location 1: abdomen  · Pain Addressed 1: Pre-medicate for activity, Reposition, Distraction, Cessation of Activity  · Pain Rating Post-Intervention 1: 5/10      Objective:     Communicated with nurse prior to session.  Patient found up in chair with   upon PT entry to room.     General Precautions: Standard,     Orthopedic Precautions:N/A   Braces:   "     Functional Mobility:  · Transfers:     · Sit to Stand:  maximal assistance with hand-held assist  · Bed to Chair: contact guard assistance with  rolling walker  using  Step Transfer  · Gait: 4-5 steps with rw with cga      AM-PAC 6 CLICK MOBILITY  Turning over in bed (including adjusting bedclothes, sheets and blankets)?: 4  Sitting down on and standing up from a chair with arms (e.g., wheelchair, bedside commode, etc.): 4  Moving from lying on back to sitting on the side of the bed?: 4  Moving to and from a bed to a chair (including a wheelchair)?: 4  Need to walk in hospital room?: 3  Climbing 3-5 steps with a railing?: 1  Basic Mobility Total Score: 20       Therapeutic Activities and Exercises:   BLE Milvia seated x 10 ; LAQ, hip flexion, hs    Patient left seated EOB with all lines intact, call button in reach, NURSE notified and mother and respiratory tech present..    GOALS:   Multidisciplinary Problems     Physical Therapy Goals        Problem: Physical Therapy Goal    Goal Priority Disciplines Outcome Goal Variances Interventions   Physical Therapy Goal     PT, PT/OT Ongoing, Progressing     Description:  Goals to be met by:      Patient will increase functional independence with mobility by performin. Gait  x 400 feet with Modified [place].                       Time Tracking:     PT Received On: 20  PT Start Time: 1638     PT Stop Time: 1712  PT Total Time (min): 34 min     Billable Minutes: Therapeutic Activity 8 and Therapeutic Exercise 16    Treatment Type: Treatment  PT/PTA: PTA     PTA Visit Number: 1     Lukasz Botello PTA  2020

## 2020-02-28 NOTE — HOSPITAL COURSE
Pt s/p partial colectomy with anastomosis 2/23 and tachycardia. Pt underwent CTA chest and was not found to have acute PE, though intraperitoneal air present. Pt having BMs and tolerating diet. HR slightly improved from 140 to 110s. Being worked up with UA and started on antibiotics.     2/28- resume eliquis BID, add BB for HR and BP control   3/1- Pt BP and HR controlled  Test for home O2 needs, SW - dc planning- PCP and surgery follow up appt

## 2020-02-28 NOTE — ASSESSMENT & PLAN NOTE
Concern for PE - h/o DVT and has been off eliquis  DVT study negative  Chest CTA:  Impression:       1. Moderate volume intraperitoneal free air.  Overall volume of air is greater than would be expected even in a recent postoperative setting.  Potential perforation not excluded.  Small amount of peritoneal free fluid also seen.  Close follow-up is recommended.  2. Bilateral lower lobe consolidative changes which may reflect atelectasis and/or pneumonia.  3. Small left pleural effusion.  4. Nondiagnostic CTA for evaluation of potential pulmonary embolus.          [facility] - TDS, EF 70%  TSH - normal range   Possibly pain induced - pt reports not controlled  Anxiety - resume home meds    Trial low dose IV metoprolol if HR >115 and BP can tolerate  Agree with antibiotics  Nebs  acapella  Mucolytic    With a non-diagnostic study, her Well's criteria puts her at 40.6% likelihood of PE; would recommend heparin infusion if there is a potential for further surgeries; if not, place back on eliquis.

## 2020-02-28 NOTE — PLAN OF CARE
AO x4, no falls or injury. NPO except for meds and ice chips maintained/ x 1 unmeasured emesis. Up to bedside commode x 1 with 2 person assist and walker. Offer PO pain medications but refused. Received IVF and IV abx. HR remains tachycardic, otherwise stable VS.  Problem: Adult Inpatient Plan of Care  Goal: Plan of Care Review  Outcome: Ongoing, Progressing  Goal: Patient-Specific Goal (Individualization)  Outcome: Ongoing, Progressing  Goal: Absence of Hospital-Acquired Illness or Injury  Outcome: Ongoing, Progressing  Goal: Optimal Comfort and Wellbeing  Outcome: Ongoing, Progressing  Goal: Readiness for Transition of Care  Outcome: Ongoing, Progressing  Goal: Rounds/Family Conference  Outcome: Ongoing, Progressing     Problem: Fall Injury Risk  Goal: Absence of Fall and Fall-Related Injury  Outcome: Ongoing, Progressing     Problem: Infection  Goal: Infection Symptom Resolution  Outcome: Ongoing, Progressing     Problem: Skin Injury Risk Increased  Goal: Skin Health and Integrity  Outcome: Ongoing, Progressing

## 2020-02-28 NOTE — PROGRESS NOTES
General Surgery Progress Note    Subjective/Interval HPI:  No acute events overnight. CT scan yesterday showing air and free fluid, but no contrast leak. Remains tachycardic, however improved after blood administration. Cr continues to trend up. States she feels better today, with more energy after transfusion. No nausea/vomiting. States she is hungry.    Objective:    Vitals:    02/28/20 0706   BP: (!) 141/76   Pulse: (!) 115   Resp: 20   Temp: 98.6 °F (37 °C)         Intake/Output Summary (Last 24 hours) at 2/28/2020 1035  Last data filed at 2/28/2020 0823  Gross per 24 hour   Intake 2946 ml   Output 100 ml   Net 2846 ml       Physical exam:  General: Awake, NAD, flattened affect   HEENT: NC, AT, EOMI  Neck: supple, no TTP  Chest: symmetric expansion, no distress, normal WOB  Cardiovascular: RRR, normal cap refill  Abdomen: obese, soft, no guarding or rebound, non-tender. Incisions c/d/i  Extremities: moves all, no edema  Neuro: Alert and Oriented  Skin: warm, dry        Imaging Results           CT Abdomen Pelvis  Without Contrast (Final result)  Result time 02/21/20 02:29:36    Final result by Chino Adame MD (02/21/20 02:29:36)                 Impression:      Abnormal findings referable to the colon concerning for the possibility of an obstructing malignant lesion involving the proximal aspect of the descending colon as detailed above.  Clinical and historical correlation is needed, further evaluation with colonoscopy or fluoroscopic barium examination is recommended.    There is distention of the right colon, transverse colon and proximal descending colon associated with the aforementioned, and there is mild edema/inflammation along the distal transverse colon, and splenic flexure.    Enlarged retroperitoneal lymph node on the right as discussed above.    Dilated small bowel loops are noted however extending to the terminal ileum and may relate to the colonic distention.    Thickened enlarged appearance  of the appendix, to some degree may relate to a baseline appearance although more prominent than on the prior study and therefore in part may relate to the small-bowel distention, primary periappendiceal inflammatory process not otherwise seen.    This report was flagged in Epic as abnormal.      Electronically signed by: Chino Adame  Date:    02/21/2020  Time:    02:29             Narrative:    EXAMINATION:  CT ABDOMEN PELVIS WITHOUT CONTRAST    CLINICAL HISTORY:  Abdominal pain, unspecified;    TECHNIQUE:  Low dose axial images, sagittal and coronal reformations were obtained from the lung bases to the pubic symphysis.  Intravenous contrast and oral contrast was not utilized.    COMPARISON:  December 4, 2016    FINDINGS:  The visualized lung bases demonstrate mild motion artifact and atelectatic change.  There is nonspecific mild-to-moderate distention of the stomach with fluid and air and ingested material.  Mild layering density of the gallbladder may relate to sludge and/or cholelithiasis, alternatively may relate to vicarious excretion of previously administered intravenous contrast, there is no evidence for pericholecystic or peripancreatic inflammatory change.  When accounting for limitations of the examination there is no evidence for acute process of the liver, spleen, or adrenal glands.  There is no hydronephrosis or obstructive uropathy bilaterally.  There is residual excreted contrast within the collecting structures of the kidneys, and the urinary bladder as well as along the right ureter.  The abdominal aorta appears normal in caliber otherwise not well evaluated on this examination.  Evaluation for adenopathy is limited on this examination however there are small retroperitoneal lymph nodes noted, there is also an enlarged lymph node seen just to the right of the IVC, overlying the right psoas muscle, and appearing just anterior to the right ovarian vein, as best seen on axial image 80 measuring  approximately 14.9 x 22.7 mm in size.    The urinary bladder demonstrates dense opacification with oral contrast, as unremarkable appearance however is not well evaluated, this obscures structures of the lower pelvis to some degree, evaluation of the uterus and adnexa is limited, there is no evidence for dominant adnexal mass or cystic collection.    There are dilated small bowel loops throughout the abdomen and pelvis, mild to moderate small bowel distention with fluid and air.  This extends to the level of the terminal ileum.  The appendix appears prominent and thickened particularly the distal appendix however this appearance is similar to the prior examination and may relate to variant anatomy although is somewhat more prominent, which could relate to the aforementioned bowel distention.  There is moderate to prominent distention involving the right colon, and transverse colon, with mild-to-moderate distention involving the splenic flexure, and proximal descending colon.  There is some mild pericolonic edema suggested along the distal transverse colon, and splenic flexure that may relate to mild superimposed inflammation/edema or colitis.  As best seen on coronal images 89 through 98, axial images 43 through 59 there is abrupt transition from dilated proximal descending colon to decompressed descending colon.  Adjacent to this there are multiple small lymph nodes, with an associated finding that may relate to an enlarged lymph node or cluster of smaller lymph nodes as seen on axial image 50 measuring up to approximately 15.7 by 22.7 mm.  The possibility that this represents an inflammatory process or a stricture is a consideration however the combined findings are most concerning for malignancy.  Clinical and historical correlation is needed if previously unknown further evaluation with colonoscopy or fluoroscopic barium examination is recommended.  The remainder of the descending colon and the rectosigmoid  colon do not appear abnormally distended there is some mild air and stool at the rectum and distal sigmoid colon.  There is no evidence for pneumatosis, there is no evidence for free intraperitoneal air, there is no evidence for mesenteric venous or portal venous air.  The visualized osseous structures appear intact.  Chronic changes are noted.                               X-Ray Abdomen Flat And Erect (Final result)  Result time 02/21/20 01:11:40    Final result by Chino Adame MD (02/21/20 01:11:40)                 Impression:      There are mildly prominent air-filled small bowel loops and colonic loops with some air-fluid levels of the small bowel and colon noted, there is relative paucity of air within the distal colon, this is nonspecific although the possibility of a distal obstructing process would be in the differential.  Clinical and historical correlation is otherwise needed.      Electronically signed by: Chino Adame  Date:    02/21/2020  Time:    01:11             Narrative:    EXAMINATION:  XR ABDOMEN FLAT AND ERECT    CLINICAL HISTORY:  Constipation, unspecified    TECHNIQUE:  Flat and erect AP views of the abdomen were performed.    COMPARISON:  February 18, 2020    FINDINGS:  Abdominal radiographic examination is submitted, 3 radiographs are submitted.  There is no evidence for free intraperitoneal air.  There is appearance thought to represent mild-to-moderate air-filled distention of the colon, predominantly the right colon and transverse colon and splenic flexure and the proximal to mid descending colon, relative paucity of air seen along the distal colon.  There are some prominent air-filled small bowel loops noted as well.  An on the erect view there appear to be some air-filled levels within the colon and small bowel.  Clinical and historical correlation is needed the possibility of a distal colonic obstructing process would be in the differential.    There is contrast density within  the collecting structures of the kidneys and the urinary bladder likely relating to recent CT examination.  Calcifications of the pelvis are nonspecific may relate to phleboliths.  The osseous structures demonstrate chronic change.                               US Lower Extremity Veins Left (Final result)  Result time 02/21/20 00:20:30    Final result by Britany Monzon MD (02/21/20 00:20:30)                 Impression:      Limited examination.  No evidence of deep venous thrombosis in the left lower extremity.    Possible Baker's cyst.      Electronically signed by: Britany Monzon  Date:    02/21/2020  Time:    00:20             Narrative:    EXAMINATION:  US LOWER EXTREMITY VEINS LEFT    CLINICAL HISTORY:  Pain in leg, unspecified    TECHNIQUE:  Duplex and color flow Doppler evaluation and graded compression of the left lower extremity veins was performed.    COMPARISON:  11/14/2018    FINDINGS:  Left thigh veins: The common femoral, femoral, popliteal, upper greater saphenous, and deep femoral veins are patent and free of thrombus. The veins are normally compressible and have normal phasic flow and augmentation response.    Left calf veins: The visualized calf veins are patent.    Contralateral CFV: The contralateral (right) common femoral vein is patent and free of thrombus.    Miscellaneous: Limited evaluation secondary to habitus.  There is a focal fluid collection measuring 2.8 x 0.9 x 2.2 cm.                               CTA Chest Non-Coronary (PE Study) (Final result)  Result time 02/20/20 21:58:29    Final result by Britany Monzon MD (02/20/20 21:58:29)                 Impression:      Suboptimal intravenous bolus.  No evidence of acute pulmonary embolism in the pulmonary trunk or main pulmonary arteries.      Electronically signed by: Britany Monzon  Date:    02/20/2020  Time:    21:58             Narrative:    EXAMINATION:  CT PULMONARY ANGIOGRAM WITH CONTRAST    CLINICAL HISTORY:  Chest  pain.    TECHNIQUE:  CT of the chest with intravenous contrast for pulmonary artery angiogram was performed. Contiguous axial 1.25 mm images followed by 10 mm reconstructions with multiplanar and MIP reformations of the pulmonary arteries. No 3D post-angiographic imaging was performed on an independent workstation and reviewed.  75 ml of Omnipaque 350 was injected.    COMPARISON:  08/27/2018    FINDINGS:  A suboptimal intravenous bolus is present.  There is no evidence of pulmonary artery filling defect to suggest pulmonary embolism in the pulmonary trunk or main pulmonary arteries.  There is no aortic aneurysm or aortic dissection.    Moderate bibasilar atelectasis or scarring is present.    There is no evidence of mediastinal, hilar, or axillary adenopathy.    There is no pleural or pericardial effusion.    The heart size is within normal limits.                                  Assessment: 39 y.o. female s/p partial segmental colectomy and primary anastomosis.  Tolerating PO and having bowel movements. Remains tachycardic now with increased WBC. CTA chest was non-diagnostic for PE. Having multiple stools and tolerating diet. However given increased WBC and tachycardia she underwent CT scan with PO contrast yesterday showing free air and fluid, but no contrast extravasation or anastomotic leak.     Plan:  -KUB to eval contrast movement  -continue IVF, and hydration for KATTY  -can trial restarting liquid diet  -UA dirty, urine clx pending  -d/c Zosy with KATTY, continue flagyl, add Cipro  -appreciate hospitalist assistance  -PT/OT  -ambulate/IS pulm toilet      Ravi Albright, PGYIII  [facility] Surgery Resident

## 2020-02-28 NOTE — PLAN OF CARE
Problem: Physical Therapy Goal  Goal: Physical Therapy Goal  Description  Goals to be met by:      Patient will increase functional independence with mobility by performin. Gait  x 400 feet with Modified Garvin.      Outcome: Ongoing, Progressing   Pt performed sit to stand x 1 trial with max A x1. Pt performed BLE Milvia x10; LAQ, HIP FLEXION, and HS

## 2020-02-28 NOTE — PROGRESS NOTES
[facility] Hospital Medicine  Progress Note    Patient Name: Maryam Oneal  MRN: [id number]  Patient Class: IP- Inpatient   Admission Date: 2/20/2020  Length of Stay: 7 days  Attending Physician: Dariel Mcginnis MD  Primary Care Provider: Phuong Reynoso MD        Subjective:     Principal Problem:Large bowel obstruction        HPI:  40yo F with two months history of cramping pain and poor appetite found to have large bowel obstruction on CT with obstructing colon mass. Underwent c/scope and partial colectomy with anastomosis 2/23.  Path: high dysplasia concerning for adenocarcinoma. Hospital medicine consulted for tachycardia. Pt has h/o PE and has been off eliquis for awhile.     DVT study negative  Chest CTA pending   Pt having BMs, advanced diet    D/w primary team, Dr. Liang- pt has some vaginal vs rectal bleeding - will proceed with anticoagulation if positive study    Overview/Hospital Course:  Pt s/p partial colectomy with anastomosis 2/23 and tachycardia. Pt underwent CTA chest and was not found to have acute PE, though intraperitoneal air present. Pt having BMs and tolerating diet. HR slightly improved from 140 to 110s. Being worked up with UA and started on antibiotics.     Interval History: pt has no new complaints     Review of Systems   Constitutional: Positive for activity change, appetite change and fatigue.   HENT: Negative.    Eyes: Negative.    Respiratory: Positive for shortness of breath.    Cardiovascular: Positive for chest pain and leg swelling.   Gastrointestinal: Positive for abdominal pain and nausea.   Endocrine: Negative.    Genitourinary: Positive for vaginal bleeding.   Musculoskeletal: Positive for gait problem.   Skin: Negative.    Neurological: Positive for weakness.   Psychiatric/Behavioral: Positive for dysphoric mood.     Objective:     Vital Signs (Most Recent):  Temp: 98.6 °F (37 °C) (02/28/20 0706)  Pulse: (!) 115 (02/28/20 0706)  Resp: 20 (02/28/20  0706)  BP: (!) 141/76 (02/28/20 0706)  SpO2: 97 % (02/28/20 0706) Vital Signs (24h Range):  Temp:  [97.7 °F (36.5 °C)-99.8 °F (37.7 °C)] 98.6 °F (37 °C)  Pulse:  [111-126] 115  Resp:  [19-28] 20  SpO2:  [93 %-98 %] 97 %  BP: ()/(55-77) 141/76     Weight: (!) 208.5 kg (459 lb 10.6 oz)  Body mass index is 55.95 kg/m².    Intake/Output Summary (Last 24 hours) at 2/28/2020 1024  Last data filed at 2/28/2020 0823  Gross per 24 hour   Intake 2946 ml   Output 100 ml   Net 2846 ml      Physical Exam   Constitutional: She is oriented to person, place, and time. She appears well-developed and well-nourished. She appears distressed.   Super morbid obese   HENT:   Head: Normocephalic and atraumatic.   Mouth/Throat: Oropharynx is clear and moist.   Eyes: Pupils are equal, round, and reactive to light. EOM are normal.   Neck: Normal range of motion. Neck supple. No JVD present.   Cardiovascular: Regular rhythm and intact distal pulses.   Tachy s1 s2   Pulmonary/Chest: She has no wheezes. She has no rales.   Increased effort    Abdominal: She exhibits distension. There is tenderness. There is guarding.   Musculoskeletal: Normal range of motion. She exhibits edema.   Neurological: She is alert and oriented to person, place, and time.   Skin: Skin is warm. Capillary refill takes 2 to 3 seconds. She is diaphoretic.   Psychiatric:   Flat affect       Significant Labs: All pertinent labs within the past 24 hours have been reviewed.    Significant Imaging: I have reviewed and interpreted all pertinent imaging results/findings within the past 24 hours.      Assessment/Plan:      * Large bowel obstruction  S/p partial colectomy with anastomosis.   - per primary       Paroxysmal tachycardia  Concern for PE - h/o DVT and has been off eliquis  DVT study negative  Chest CTA:  Impression:       1. Moderate volume intraperitoneal free air.  Overall volume of air is greater than would be expected even in a recent postoperative setting.   Potential perforation not excluded.  Small amount of peritoneal free fluid also seen.  Close follow-up is recommended.  2. Bilateral lower lobe consolidative changes which may reflect atelectasis and/or pneumonia.  3. Small left pleural effusion.  4. Nondiagnostic CTA for evaluation of potential pulmonary embolus.          E[facility] - TDS, EF 70%  TSH - normal range   Possibly pain induced - pt reports not controlled  Anxiety - resume home meds    Trial low dose IV metoprolol if HR >115 and BP can tolerate  Agree with antibiotics  Nebs  acapella  Mucolytic    With a non-diagnostic study, her Well's criteria puts her at 40.6% likelihood of PE; would recommend heparin infusion if there is a potential for further surgeries; if not, place back on eliquis.             Abdominal pain    See above    Major depressive disorder, recurrent episode, severe  Resume home medications      Anemia of chronic disease  In setting of colon mass  Type and screen blood  Transfuse if h/h continues to drop         VTE Risk Mitigation (From admission, onward)         Ordered     heparin (porcine) injection 5,000 Units  Every 8 hours      02/25/20 1116     IP VTE HIGH RISK PATIENT  Once      02/21/20 0558     Place FLETCHER hose  Until discontinued      02/21/20 0558     Place sequential compression device  Until discontinued      02/21/20 0558                      Charmaine Campbell MD  Department of Hospital Medicine   [facility]

## 2020-02-28 NOTE — SUBJECTIVE & OBJECTIVE
Interval History: pt has no new complaints     Review of Systems   Constitutional: Positive for activity change, appetite change and fatigue.   HENT: Negative.    Eyes: Negative.    Respiratory: Positive for shortness of breath.    Cardiovascular: Positive for chest pain and leg swelling.   Gastrointestinal: Positive for abdominal pain and nausea.   Endocrine: Negative.    Genitourinary: Positive for vaginal bleeding.   Musculoskeletal: Positive for gait problem.   Skin: Negative.    Neurological: Positive for weakness.   Psychiatric/Behavioral: Positive for dysphoric mood.     Objective:     Vital Signs (Most Recent):  Temp: 98.6 °F (37 °C) (02/28/20 0706)  Pulse: (!) 115 (02/28/20 0706)  Resp: 20 (02/28/20 0706)  BP: (!) 141/76 (02/28/20 0706)  SpO2: 97 % (02/28/20 0706) Vital Signs (24h Range):  Temp:  [97.7 °F (36.5 °C)-99.8 °F (37.7 °C)] 98.6 °F (37 °C)  Pulse:  [111-126] 115  Resp:  [19-28] 20  SpO2:  [93 %-98 %] 97 %  BP: ()/(55-77) 141/76     Weight: (!) 208.5 kg (459 lb 10.6 oz)  Body mass index is 55.95 kg/m².    Intake/Output Summary (Last 24 hours) at 2/28/2020 1024  Last data filed at 2/28/2020 0823  Gross per 24 hour   Intake 2946 ml   Output 100 ml   Net 2846 ml      Physical Exam   Constitutional: She is oriented to person, place, and time. She appears well-developed and well-nourished. She appears distressed.   Super morbid obese   HENT:   Head: Normocephalic and atraumatic.   Mouth/Throat: Oropharynx is clear and moist.   Eyes: Pupils are equal, round, and reactive to light. EOM are normal.   Neck: Normal range of motion. Neck supple. No JVD present.   Cardiovascular: Regular rhythm and intact distal pulses.   Tachy s1 s2   Pulmonary/Chest: She has no wheezes. She has no rales.   Increased effort    Abdominal: She exhibits distension. There is tenderness. There is guarding.   Musculoskeletal: Normal range of motion. She exhibits edema.   Neurological: She is alert and oriented to person,  place, and time.   Skin: Skin is warm. Capillary refill takes 2 to 3 seconds. She is diaphoretic.   Psychiatric:   Flat affect       Significant Labs: All pertinent labs within the past 24 hours have been reviewed.    Significant Imaging: I have reviewed and interpreted all pertinent imaging results/findings within the past 24 hours.

## 2020-02-29 LAB
ALBUMIN SERPL BCP-MCNC: 1.8 G/DL (ref 3.5–5.2)
ALP SERPL-CCNC: 87 U/L (ref 55–135)
ALT SERPL W/O P-5'-P-CCNC: 15 U/L (ref 10–44)
ANION GAP SERPL CALC-SCNC: 10 MMOL/L (ref 8–16)
AST SERPL-CCNC: 24 U/L (ref 10–40)
BACTERIA UR CULT: ABNORMAL
BASOPHILS # BLD AUTO: 0.02 K/UL (ref 0–0.2)
BASOPHILS NFR BLD: 0.1 % (ref 0–1.9)
BILIRUB SERPL-MCNC: 0.4 MG/DL (ref 0.1–1)
BUN SERPL-MCNC: 17 MG/DL (ref 6–20)
CALCIUM SERPL-MCNC: 8.7 MG/DL (ref 8.7–10.5)
CHLORIDE SERPL-SCNC: 101 MMOL/L (ref 95–110)
CO2 SERPL-SCNC: 23 MMOL/L (ref 23–29)
CREAT SERPL-MCNC: 1.2 MG/DL (ref 0.5–1.4)
DIFFERENTIAL METHOD: ABNORMAL
EOSINOPHIL # BLD AUTO: 0 K/UL (ref 0–0.5)
EOSINOPHIL NFR BLD: 0.3 % (ref 0–8)
ERYTHROCYTE [DISTWIDTH] IN BLOOD BY AUTOMATED COUNT: 16.2 % (ref 11.5–14.5)
EST. GFR  (AFRICAN AMERICAN): >60 ML/MIN/1.73 M^2
EST. GFR  (NON AFRICAN AMERICAN): 57 ML/MIN/1.73 M^2
GIANT PLATELETS BLD QL SMEAR: PRESENT
GLUCOSE SERPL-MCNC: 130 MG/DL (ref 70–110)
HCT VFR BLD AUTO: 27.1 % (ref 37–48.5)
HGB BLD-MCNC: 8.7 G/DL (ref 12–16)
IMM GRANULOCYTES # BLD AUTO: 0.34 K/UL (ref 0–0.04)
IMM GRANULOCYTES NFR BLD AUTO: 2.4 % (ref 0–0.5)
LYMPHOCYTES # BLD AUTO: 1.1 K/UL (ref 1–4.8)
LYMPHOCYTES NFR BLD: 7.6 % (ref 18–48)
MCH RBC QN AUTO: 26 PG (ref 27–31)
MCHC RBC AUTO-ENTMCNC: 32.1 G/DL (ref 32–36)
MCV RBC AUTO: 81 FL (ref 82–98)
MONOCYTES # BLD AUTO: 1 K/UL (ref 0.3–1)
MONOCYTES NFR BLD: 6.8 % (ref 4–15)
NEUTROPHILS # BLD AUTO: 11.8 K/UL (ref 1.8–7.7)
NEUTROPHILS NFR BLD: 82.8 % (ref 38–73)
NRBC BLD-RTO: 0 /100 WBC
PLATELET # BLD AUTO: ABNORMAL K/UL (ref 150–350)
PLATELET BLD QL SMEAR: ABNORMAL
PMV BLD AUTO: ABNORMAL FL (ref 9.2–12.9)
POTASSIUM SERPL-SCNC: 2.8 MMOL/L (ref 3.5–5.1)
PROT SERPL-MCNC: 6.4 G/DL (ref 6–8.4)
RBC # BLD AUTO: 3.35 M/UL (ref 4–5.4)
SODIUM SERPL-SCNC: 134 MMOL/L (ref 136–145)
WBC # BLD AUTO: 14.2 K/UL (ref 3.9–12.7)
WBC TOXIC VACUOLES BLD QL SMEAR: PRESENT

## 2020-02-29 PROCEDURE — 25000003 PHARM REV CODE 250: Performed by: SURGERY

## 2020-02-29 PROCEDURE — 25000003 PHARM REV CODE 250: Performed by: STUDENT IN AN ORGANIZED HEALTH CARE EDUCATION/TRAINING PROGRAM

## 2020-02-29 PROCEDURE — 25000003 PHARM REV CODE 250: Performed by: HOSPITALIST

## 2020-02-29 PROCEDURE — 25000003 PHARM REV CODE 250

## 2020-02-29 PROCEDURE — A4216 STERILE WATER/SALINE, 10 ML: HCPCS | Performed by: SURGERY

## 2020-02-29 PROCEDURE — 80053 COMPREHEN METABOLIC PANEL: CPT

## 2020-02-29 PROCEDURE — 94640 AIRWAY INHALATION TREATMENT: CPT

## 2020-02-29 PROCEDURE — 36569 INSJ PICC 5 YR+ W/O IMAGING: CPT

## 2020-02-29 PROCEDURE — 94761 N-INVAS EAR/PLS OXIMETRY MLT: CPT

## 2020-02-29 PROCEDURE — 99900035 HC TECH TIME PER 15 MIN (STAT)

## 2020-02-29 PROCEDURE — 94664 DEMO&/EVAL PT USE INHALER: CPT

## 2020-02-29 PROCEDURE — 27000646 HC AEROBIKA DEVICE

## 2020-02-29 PROCEDURE — 63600175 PHARM REV CODE 636 W HCPCS

## 2020-02-29 PROCEDURE — 25000242 PHARM REV CODE 250 ALT 637 W/ HCPCS: Performed by: HOSPITALIST

## 2020-02-29 PROCEDURE — S0030 INJECTION, METRONIDAZOLE: HCPCS | Performed by: SURGERY

## 2020-02-29 PROCEDURE — 63600175 PHARM REV CODE 636 W HCPCS: Performed by: SURGERY

## 2020-02-29 PROCEDURE — 85025 COMPLETE CBC W/AUTO DIFF WBC: CPT

## 2020-02-29 PROCEDURE — C1751 CATH, INF, PER/CENT/MIDLINE: HCPCS

## 2020-02-29 PROCEDURE — 11000001 HC ACUTE MED/SURG PRIVATE ROOM

## 2020-02-29 PROCEDURE — C9113 INJ PANTOPRAZOLE SODIUM, VIA: HCPCS | Performed by: SURGERY

## 2020-02-29 PROCEDURE — 36415 COLL VENOUS BLD VENIPUNCTURE: CPT

## 2020-02-29 RX ORDER — OXYCODONE HCL 5 MG/5 ML
5 SOLUTION, ORAL ORAL EVERY 4 HOURS PRN
Status: DISCONTINUED | OUTPATIENT
Start: 2020-02-29 | End: 2020-03-02 | Stop reason: HOSPADM

## 2020-02-29 RX ORDER — ACETAMINOPHEN 650 MG/20.3ML
500 LIQUID ORAL EVERY 6 HOURS
Status: DISCONTINUED | OUTPATIENT
Start: 2020-02-29 | End: 2020-03-02 | Stop reason: HOSPADM

## 2020-02-29 RX ORDER — OXYCODONE HCL 5 MG/5 ML
10 SOLUTION, ORAL ORAL EVERY 4 HOURS PRN
Status: DISCONTINUED | OUTPATIENT
Start: 2020-02-29 | End: 2020-03-02 | Stop reason: HOSPADM

## 2020-02-29 RX ORDER — POTASSIUM CHLORIDE 7.45 MG/ML
10 INJECTION INTRAVENOUS
Status: COMPLETED | OUTPATIENT
Start: 2020-02-29 | End: 2020-02-29

## 2020-02-29 RX ORDER — SODIUM CHLORIDE 0.9 % (FLUSH) 0.9 %
10 SYRINGE (ML) INJECTION EVERY 6 HOURS
Status: DISCONTINUED | OUTPATIENT
Start: 2020-02-29 | End: 2020-03-02 | Stop reason: HOSPADM

## 2020-02-29 RX ORDER — LABETALOL 100 MG/1
200 TABLET, FILM COATED ORAL EVERY 12 HOURS
Status: DISCONTINUED | OUTPATIENT
Start: 2020-02-29 | End: 2020-03-02 | Stop reason: HOSPADM

## 2020-02-29 RX ORDER — KETOROLAC TROMETHAMINE 30 MG/ML
15 INJECTION, SOLUTION INTRAMUSCULAR; INTRAVENOUS ONCE
Status: COMPLETED | OUTPATIENT
Start: 2020-02-29 | End: 2020-02-29

## 2020-02-29 RX ORDER — LIDOCAINE HYDROCHLORIDE 10 MG/ML
1 INJECTION INFILTRATION; PERINEURAL ONCE AS NEEDED
Status: DISCONTINUED | OUTPATIENT
Start: 2020-02-29 | End: 2020-03-02 | Stop reason: HOSPADM

## 2020-02-29 RX ORDER — SODIUM CHLORIDE 0.9 % (FLUSH) 0.9 %
10 SYRINGE (ML) INJECTION
Status: DISCONTINUED | OUTPATIENT
Start: 2020-02-29 | End: 2020-03-02 | Stop reason: HOSPADM

## 2020-02-29 RX ADMIN — APIXABAN 5 MG: 5 TABLET, FILM COATED ORAL at 08:02

## 2020-02-29 RX ADMIN — HYDRALAZINE HYDROCHLORIDE 10 MG: 20 INJECTION INTRAMUSCULAR; INTRAVENOUS at 11:02

## 2020-02-29 RX ADMIN — DEXTROSE MONOHYDRATE, SODIUM CHLORIDE, AND POTASSIUM CHLORIDE: 50; 2.25; 1.49 INJECTION, SOLUTION INTRAVENOUS at 12:02

## 2020-02-29 RX ADMIN — Medication 10 ML: at 05:02

## 2020-02-29 RX ADMIN — METRONIDAZOLE 500 MG: 500 INJECTION, SOLUTION INTRAVENOUS at 06:02

## 2020-02-29 RX ADMIN — DEXTROSE MONOHYDRATE, SODIUM CHLORIDE, AND POTASSIUM CHLORIDE: 50; 2.25; 1.49 INJECTION, SOLUTION INTRAVENOUS at 09:02

## 2020-02-29 RX ADMIN — POTASSIUM CHLORIDE 10 MEQ: 7.46 INJECTION, SOLUTION INTRAVENOUS at 11:02

## 2020-02-29 RX ADMIN — LEVALBUTEROL HYDROCHLORIDE 0.63 MG: 0.63 SOLUTION RESPIRATORY (INHALATION) at 11:02

## 2020-02-29 RX ADMIN — LABETALOL HYDROCHLORIDE 200 MG: 100 TABLET, FILM COATED ORAL at 12:02

## 2020-02-29 RX ADMIN — POTASSIUM CHLORIDE 10 MEQ: 7.46 INJECTION, SOLUTION INTRAVENOUS at 12:02

## 2020-02-29 RX ADMIN — POTASSIUM CHLORIDE 10 MEQ: 7.46 INJECTION, SOLUTION INTRAVENOUS at 10:02

## 2020-02-29 RX ADMIN — METRONIDAZOLE 500 MG: 500 INJECTION, SOLUTION INTRAVENOUS at 03:02

## 2020-02-29 RX ADMIN — ACETAMINOPHEN ORAL SOLUTION 499.51 MG: 650 SOLUTION ORAL at 11:02

## 2020-02-29 RX ADMIN — LEVALBUTEROL HYDROCHLORIDE 0.63 MG: 0.63 SOLUTION RESPIRATORY (INHALATION) at 07:02

## 2020-02-29 RX ADMIN — DEXTROSE MONOHYDRATE, SODIUM CHLORIDE, AND POTASSIUM CHLORIDE: 50; 2.25; 1.49 INJECTION, SOLUTION INTRAVENOUS at 06:02

## 2020-02-29 RX ADMIN — METRONIDAZOLE 500 MG: 500 INJECTION, SOLUTION INTRAVENOUS at 10:02

## 2020-02-29 RX ADMIN — ACETAMINOPHEN ORAL SOLUTION 499.51 MG: 650 SOLUTION ORAL at 05:02

## 2020-02-29 RX ADMIN — KETOROLAC TROMETHAMINE 15 MG: 30 INJECTION, SOLUTION INTRAMUSCULAR at 10:02

## 2020-02-29 RX ADMIN — DULOXETINE HYDROCHLORIDE 60 MG: 30 CAPSULE, DELAYED RELEASE ORAL at 09:02

## 2020-02-29 RX ADMIN — LEVALBUTEROL HYDROCHLORIDE 0.63 MG: 0.63 SOLUTION RESPIRATORY (INHALATION) at 04:02

## 2020-02-29 RX ADMIN — LABETALOL HYDROCHLORIDE 200 MG: 100 TABLET, FILM COATED ORAL at 08:02

## 2020-02-29 RX ADMIN — Medication 10 ML: at 11:02

## 2020-02-29 RX ADMIN — CIPROFLOXACIN 400 MG: 2 INJECTION, SOLUTION INTRAVENOUS at 12:02

## 2020-02-29 RX ADMIN — TRAZODONE HYDROCHLORIDE 50 MG: 50 TABLET ORAL at 08:02

## 2020-02-29 RX ADMIN — POTASSIUM CHLORIDE 10 MEQ: 7.46 INJECTION, SOLUTION INTRAVENOUS at 09:02

## 2020-02-29 RX ADMIN — PANTOPRAZOLE SODIUM 40 MG: 40 INJECTION, POWDER, FOR SOLUTION INTRAVENOUS at 09:02

## 2020-02-29 RX ADMIN — CIPROFLOXACIN 400 MG: 2 INJECTION, SOLUTION INTRAVENOUS at 11:02

## 2020-02-29 RX ADMIN — HEPARIN SODIUM 5000 UNITS: 5000 INJECTION, SOLUTION INTRAVENOUS; SUBCUTANEOUS at 05:02

## 2020-02-29 RX ADMIN — OXYCODONE HYDROCHLORIDE AND ACETAMINOPHEN 1 TABLET: 5; 325 TABLET ORAL at 08:02

## 2020-02-29 NOTE — PROGRESS NOTES
[facility]  Progress Note    Patient Name: Maryam Oneal  MRN: [id number]  Patient Class: IP- Inpatient   Admission Date: 2/20/2020  Length of Stay: 8 days  Attending Physician: Dariel Mcginnis MD  Primary Care Provider: Phuong Reynoso MD        Subjective:     Principal Problem:Large bowel obstruction        HPI:  38yo F with two months history of cramping pain and poor appetite found to have large bowel obstruction on CT with obstructing colon mass. Underwent c/scope and partial colectomy with anastomosis 2/23.  Path: high dysplasia concerning for adenocarcinoma. Hospital medicine consulted for tachycardia. Pt has h/o PE and has been off eliquis for awhile.     DVT study negative  Chest CTA pending   Pt having BMs, advanced diet    D/w primary team, Dr. Liang- pt has some vaginal vs rectal bleeding - will proceed with anticoagulation if positive study    Overview/Hospital Course:  Pt s/p partial colectomy with anastomosis 2/23 and tachycardia. Pt underwent CTA chest and was not found to have acute PE, though intraperitoneal air present. Pt having BMs and tolerating diet. HR slightly improved from 140 to 110s. Being worked up with UA and started on antibiotics.     2/28- resume eliquis BID, add BB for HR and BP control     Interval History: sitting up in chair, c/o pain 5/10    Review of Systems   Constitutional: Positive for activity change, appetite change and fatigue.   HENT: Negative.    Eyes: Negative.    Respiratory: Positive for shortness of breath.    Cardiovascular: Positive for chest pain and leg swelling.   Gastrointestinal: Positive for abdominal pain and nausea.   Endocrine: Negative.    Genitourinary: Positive for vaginal bleeding.   Musculoskeletal: Positive for gait problem.   Skin: Negative.    Neurological: Positive for weakness.   Psychiatric/Behavioral: Positive for dysphoric mood.     Objective:     Vital Signs (Most Recent):  Temp: 98.1 °F (36.7 °C)  (02/29/20 1121)  Pulse: 110 (02/29/20 1157)  Resp: 19 (02/29/20 1121)  BP: (!) 169/101 (02/29/20 1157)  SpO2: 99 % (02/29/20 1121) Vital Signs (24h Range):  Temp:  [97.6 °F (36.4 °C)-98.3 °F (36.8 °C)] 98.1 °F (36.7 °C)  Pulse:  [] 110  Resp:  [18-22] 19  SpO2:  [94 %-99 %] 99 %  BP: (136-186)/() 169/101     Weight: (!) 208.5 kg (459 lb 10.6 oz)  Body mass index is 55.95 kg/m².    Intake/Output Summary (Last 24 hours) at 2/29/2020 1212  Last data filed at 2/29/2020 0553  Gross per 24 hour   Intake 2915 ml   Output --   Net 2915 ml      Physical Exam   Constitutional: She is oriented to person, place, and time. She appears well-developed and well-nourished. She appears distressed.   Super morbid obese   HENT:   Head: Normocephalic and atraumatic.   Mouth/Throat: Oropharynx is clear and moist.   Eyes: Pupils are equal, round, and reactive to light. EOM are normal.   Neck: Normal range of motion. Neck supple. No JVD present.   Cardiovascular: Regular rhythm and intact distal pulses.   Tachy s1 s2   Pulmonary/Chest: She has no wheezes. She has no rales.   Increased effort    Abdominal: She exhibits distension. There is tenderness. There is guarding.   Musculoskeletal: Normal range of motion. She exhibits edema.   Neurological: She is alert and oriented to person, place, and time.   Skin: Skin is warm. Capillary refill takes 2 to 3 seconds. She is diaphoretic.   Psychiatric:   Flat affect       Significant Labs: All pertinent labs within the past 24 hours have been reviewed.    Significant Imaging: I have reviewed and interpreted all pertinent imaging results/findings within the past 24 hours.      Assessment/Plan:      * Large bowel obstruction  S/p partial colectomy with anastomosis.   - per primary       Paroxysmal tachycardia  Concern for PE - h/o DVT and has been off eliquis  DVT study negative  Chest CTA:  Impression:       1. Moderate volume intraperitoneal free air.  Overall volume of air is greater  than would be expected even in a recent postoperative setting.  Potential perforation not excluded.  Small amount of peritoneal free fluid also seen.  Close follow-up is recommended.  2. Bilateral lower lobe consolidative changes which may reflect atelectasis and/or pneumonia.  3. Small left pleural effusion.  4. Nondiagnostic CTA for evaluation of potential pulmonary embolus.          ECHo - TDS, EF 70%  TSH - normal range   Possibly pain induced - pt reports not controlled  Anxiety - resume home meds    Trial low dose IV metoprolol if HR >115 and BP can tolerate  Agree with antibiotics  Nebs  acapella  Mucolytic    With a non-diagnostic study, her Well's criteria puts her at 40.6% likelihood of PE; would recommend heparin infusion if there is a potential for further surgeries; if not, place back on eliquis.     Add labetalol             Abdominal pain    See above    Major depressive disorder, recurrent episode, severe  Resume home medications      Anemia of chronic disease  In setting of colon mass  Type and screen blood  Transfuse if h/h continues to drop         VTE Risk Mitigation (From admission, onward)         Ordered     apixaban tablet 5 mg  2 times daily      02/29/20 1209     IP VTE HIGH RISK PATIENT  Once      02/21/20 0558     Place FLETCHER hose  Until discontinued      02/21/20 0558     Place sequential compression device  Until discontinued      02/21/20 0558                      Charmaine Campbell MD  Department of Hospital Medicine   [facility]

## 2020-02-29 NOTE — PLAN OF CARE
Pt alert able to make needs known, alicia meds well,pain controlled by prn pain medication,iv abx remains in progress,no s/s adverse reaction noted,remains free from falls and injuries,appetite fair,safety,maintained,continue monitoring.

## 2020-02-29 NOTE — SUBJECTIVE & OBJECTIVE
Interval History: sitting up in chair, c/o pain 5/10    Review of Systems   Constitutional: Positive for activity change, appetite change and fatigue.   HENT: Negative.    Eyes: Negative.    Respiratory: Positive for shortness of breath.    Cardiovascular: Positive for chest pain and leg swelling.   Gastrointestinal: Positive for abdominal pain and nausea.   Endocrine: Negative.    Genitourinary: Positive for vaginal bleeding.   Musculoskeletal: Positive for gait problem.   Skin: Negative.    Neurological: Positive for weakness.   Psychiatric/Behavioral: Positive for dysphoric mood.     Objective:     Vital Signs (Most Recent):  Temp: 98.1 °F (36.7 °C) (02/29/20 1121)  Pulse: 110 (02/29/20 1157)  Resp: 19 (02/29/20 1121)  BP: (!) 169/101 (02/29/20 1157)  SpO2: 99 % (02/29/20 1121) Vital Signs (24h Range):  Temp:  [97.6 °F (36.4 °C)-98.3 °F (36.8 °C)] 98.1 °F (36.7 °C)  Pulse:  [] 110  Resp:  [18-22] 19  SpO2:  [94 %-99 %] 99 %  BP: (136-186)/() 169/101     Weight: (!) 208.5 kg (459 lb 10.6 oz)  Body mass index is 55.95 kg/m².    Intake/Output Summary (Last 24 hours) at 2/29/2020 1212  Last data filed at 2/29/2020 0553  Gross per 24 hour   Intake 2915 ml   Output --   Net 2915 ml      Physical Exam   Constitutional: She is oriented to person, place, and time. She appears well-developed and well-nourished. She appears distressed.   Super morbid obese   HENT:   Head: Normocephalic and atraumatic.   Mouth/Throat: Oropharynx is clear and moist.   Eyes: Pupils are equal, round, and reactive to light. EOM are normal.   Neck: Normal range of motion. Neck supple. No JVD present.   Cardiovascular: Regular rhythm and intact distal pulses.   Tachy s1 s2   Pulmonary/Chest: She has no wheezes. She has no rales.   Increased effort    Abdominal: She exhibits distension. There is tenderness. There is guarding.   Musculoskeletal: Normal range of motion. She exhibits edema.   Neurological: She is alert and oriented to  person, place, and time.   Skin: Skin is warm. Capillary refill takes 2 to 3 seconds. She is diaphoretic.   Psychiatric:   Flat affect       Significant Labs: All pertinent labs within the past 24 hours have been reviewed.    Significant Imaging: I have reviewed and interpreted all pertinent imaging results/findings within the past 24 hours.

## 2020-02-29 NOTE — PROGRESS NOTES
General Surgery Progress Note    No overnight events.  Positive flatus.  Tolerating soft food.  Out of bed under own power.  Pain moderately controlled.  Some nausea.    Vitals:    02/29/20 0132 02/29/20 0134 02/29/20 0436 02/29/20 0754   BP: (!) 140/95 (!) 136/90 (!) 167/82 (!) 174/88   BP Location: Left arm Left arm Left arm Left arm   Patient Position: Lying Lying Lying Lying   Pulse: 109  106 104   Resp: (!) 21 20 18   Temp:   98.3 °F (36.8 °C) 98.1 °F (36.7 °C)   TempSrc:   Oral Oral   SpO2: 96%  96% 96%   Weight:       Height:         NAD, fatigued, flat affect per baseline  Abdomen soft, appropriately tender  Incisions c/d/i    WBC 14.2 (decreasing)  H/H 8.7/27.1 (decreasing)  Cr 1.2 (decreasing)    Assessment: 39 y.o. female s/p partial segmental colectomy and primary anastomosis.  Tolerating PO and having bowel movements.  There was concern for thromboembolic event including pulmonary embolism in the early postoperative period, however there was no definitive evidence PE and her tachycardia is improving.  Appreciate Internal medicine's recommendation to restart the patient's Eliquis, however as she is more than 1 year removed from her only other previous episode of a DVT and with her in the early postoperative period with associated anemia, will hold off on initiating systemic anticoagulation at present.  Pneumoperitoneum seen on the scan is likely residual status post exploratory laparotomy and colon resection given her otherwise improving clinical state.     Plan:  -continue IVF, and hydration for KATTY (improving)  -will give one dose of toradol and schedule acetaminophen for pain control  -encourage PO  -UA dirty, urine clx pending  -continue flagyl, Cipro  -appreciate hospitalist assistance  -PT/OT  -ambulate/IS pulm toilet

## 2020-02-29 NOTE — ASSESSMENT & PLAN NOTE
Concern for PE - h/o DVT and has been off eliquis  DVT study negative  Chest CTA:  Impression:       1. Moderate volume intraperitoneal free air.  Overall volume of air is greater than would be expected even in a recent postoperative setting.  Potential perforation not excluded.  Small amount of peritoneal free fluid also seen.  Close follow-up is recommended.  2. Bilateral lower lobe consolidative changes which may reflect atelectasis and/or pneumonia.  3. Small left pleural effusion.  4. Nondiagnostic CTA for evaluation of potential pulmonary embolus.          ECHo - TDS, EF 70%  TSH - normal range   Possibly pain induced - pt reports not controlled  Anxiety - resume home meds    Trial low dose IV metoprolol if HR >115 and BP can tolerate  Agree with antibiotics  Nebs  acapella  Mucolytic    With a non-diagnostic study, her Well's criteria puts her at 40.6% likelihood of PE; would recommend heparin infusion if there is a potential for further surgeries; if not, place back on eliquis.     Add labetalol

## 2020-02-29 NOTE — PLAN OF CARE
Patient alert and oriented x4. Respirations even and unlabored. 02 noted at 2l.  No distress noted. Skin warm and dry. Midline staples noted to abdomen. Iv fluids infusing as ordered. Iv antibiotics given as ordered. Patient denies need for pain medication. Pain medication explained. Patient has no complaints at this time. Instructed to call for any needs. Bed in lowest position. Call bell within reach. Will continue to monitor.     Problem: Adult Inpatient Plan of Care  Goal: Plan of Care Review  Outcome: Ongoing, Progressing  Goal: Absence of Hospital-Acquired Illness or Injury  Outcome: Ongoing, Progressing     Problem: Fall Injury Risk  Goal: Absence of Fall and Fall-Related Injury  Outcome: Ongoing, Progressing  Intervention: Identify and Manage Contributors to Fall Injury Risk  Flowsheets (Taken 2/29/2020 0427)  Self-Care Promotion: independence encouraged; BADL personal objects within reach  Medication Review/Management: medications reviewed; high risk medications identified  Intervention: Promote Injury-Free Environment  Flowsheets (Taken 2/29/2020 0427)  Safety Promotion/Fall Prevention: assistive device/personal item within reach; bed alarm set; side rails raised x 2; /camera at bedside  Environmental Safety Modification: clutter free environment maintained; assistive device/personal items within reach     Problem: Skin Injury Risk Increased  Goal: Skin Health and Integrity  Outcome: Ongoing, Progressing  Intervention: Optimize Skin Protection  Flowsheets (Taken 2/29/2020 0427)  Pressure Reduction Techniques: frequent weight shift encouraged  Head of Bed (HOB): HOB elevated

## 2020-02-29 NOTE — PROCEDURES
"Maryam Oneal is a 39 y.o. female patient.    Temp: 98.1 °F (36.7 °C) (02/29/20 0754)  Pulse: 104 (02/29/20 0754)  Resp: 18 (02/29/20 0754)  BP: (!) 174/88 (02/29/20 0754)  SpO2: 96 % (02/29/20 0754)  Weight: (!) 208.5 kg (459 lb 10.6 oz) (02/21/20 0600)  Height: 6' 4" (193 cm) (02/21/20 0600)    PICC  Date/Time: 2/29/2020 11:00 AM  Performed by: Noah Gomez RN  Consent Done: Yes  Time out: Immediately prior to procedure a time out was called to verify the correct patient, procedure, equipment, support staff and site/side marked as required  Indications: med administration and vascular access  Anesthesia: local infiltration  Local anesthetic: lidocaine 1% without epinephrine  Anesthetic Total (mL): 3  Preparation: skin prepped with ChloraPrep  Skin prep agent dried: skin prep agent completely dried prior to procedure  Sterile barriers: all five maximum sterile barriers used - cap, mask, sterile gown, sterile gloves, and large sterile sheet  Hand hygiene: hand hygiene performed prior to central venous catheter insertion  Location details: right basilic  Catheter type: double lumen  Catheter size: 5 Fr  Catheter Length: 44cm    Ultrasound guidance: yes  Vessel Caliber: large, compressibility normal  Needle advanced into vessel with real time Ultrasound guidance.  Guidewire confirmed in vessel.  Sterile sheath used.  Number of attempts: 1  Post-procedure: blood return through all ports, chlorhexidine patch and sterile dressing applied            Noah Gomez  2/29/2020  "

## 2020-03-01 LAB
25(OH)D3+25(OH)D2 SERPL-MCNC: 9 NG/ML (ref 30–96)
ALBUMIN SERPL BCP-MCNC: 1.8 G/DL (ref 3.5–5.2)
ALP SERPL-CCNC: 77 U/L (ref 55–135)
ALT SERPL W/O P-5'-P-CCNC: 14 U/L (ref 10–44)
ANION GAP SERPL CALC-SCNC: 8 MMOL/L (ref 8–16)
AST SERPL-CCNC: 27 U/L (ref 10–40)
BASOPHILS # BLD AUTO: ABNORMAL K/UL (ref 0–0.2)
BASOPHILS NFR BLD: 0 % (ref 0–1.9)
BILIRUB SERPL-MCNC: 0.3 MG/DL (ref 0.1–1)
BLD PROD TYP BPU: NORMAL
BLD PROD TYP BPU: NORMAL
BLOOD UNIT EXPIRATION DATE: NORMAL
BLOOD UNIT EXPIRATION DATE: NORMAL
BLOOD UNIT TYPE CODE: 7300
BLOOD UNIT TYPE CODE: 7300
BLOOD UNIT TYPE: NORMAL
BLOOD UNIT TYPE: NORMAL
BUN SERPL-MCNC: 12 MG/DL (ref 6–20)
CALCIUM SERPL-MCNC: 8.4 MG/DL (ref 8.7–10.5)
CHLORIDE SERPL-SCNC: 102 MMOL/L (ref 95–110)
CO2 SERPL-SCNC: 23 MMOL/L (ref 23–29)
CODING SYSTEM: NORMAL
CODING SYSTEM: NORMAL
CREAT SERPL-MCNC: 1 MG/DL (ref 0.5–1.4)
DIFFERENTIAL METHOD: ABNORMAL
DISPENSE STATUS: NORMAL
DISPENSE STATUS: NORMAL
EOSINOPHIL # BLD AUTO: ABNORMAL K/UL (ref 0–0.5)
EOSINOPHIL NFR BLD: 0 % (ref 0–8)
ERYTHROCYTE [DISTWIDTH] IN BLOOD BY AUTOMATED COUNT: 16.4 % (ref 11.5–14.5)
EST. GFR  (AFRICAN AMERICAN): >60 ML/MIN/1.73 M^2
EST. GFR  (NON AFRICAN AMERICAN): >60 ML/MIN/1.73 M^2
GLUCOSE SERPL-MCNC: 117 MG/DL (ref 70–110)
HCT VFR BLD AUTO: 26.7 % (ref 37–48.5)
HGB BLD-MCNC: 8.2 G/DL (ref 12–16)
HYPOCHROMIA BLD QL SMEAR: ABNORMAL
IMM GRANULOCYTES # BLD AUTO: ABNORMAL K/UL (ref 0–0.04)
IMM GRANULOCYTES NFR BLD AUTO: ABNORMAL % (ref 0–0.5)
LYMPHOCYTES # BLD AUTO: ABNORMAL K/UL (ref 1–4.8)
LYMPHOCYTES NFR BLD: 14 % (ref 18–48)
MCH RBC QN AUTO: 25.4 PG (ref 27–31)
MCHC RBC AUTO-ENTMCNC: 30.7 G/DL (ref 32–36)
MCV RBC AUTO: 83 FL (ref 82–98)
METAMYELOCYTES NFR BLD MANUAL: 1 %
MONOCYTES # BLD AUTO: ABNORMAL K/UL (ref 0.3–1)
MONOCYTES NFR BLD: 7 % (ref 4–15)
MYELOCYTES NFR BLD MANUAL: 5 %
NEUTROPHILS # BLD AUTO: ABNORMAL K/UL (ref 1.8–7.7)
NEUTROPHILS NFR BLD: 71 % (ref 38–73)
NEUTS BAND NFR BLD MANUAL: 2 %
NRBC BLD-RTO: 0 /100 WBC
NUM UNITS TRANS PACKED RBC: NORMAL
PLATELET # BLD AUTO: 296 K/UL (ref 150–350)
PLATELET BLD QL SMEAR: ABNORMAL
PMV BLD AUTO: 9.9 FL (ref 9.2–12.9)
POTASSIUM SERPL-SCNC: 3 MMOL/L (ref 3.5–5.1)
PROT SERPL-MCNC: 6 G/DL (ref 6–8.4)
RBC # BLD AUTO: 3.23 M/UL (ref 4–5.4)
SODIUM SERPL-SCNC: 133 MMOL/L (ref 136–145)
TRANS ERYTHROCYTES VOL PATIENT: NORMAL ML
WBC # BLD AUTO: 8.71 K/UL (ref 3.9–12.7)

## 2020-03-01 PROCEDURE — 82306 VITAMIN D 25 HYDROXY: CPT

## 2020-03-01 PROCEDURE — 25000003 PHARM REV CODE 250: Performed by: SURGERY

## 2020-03-01 PROCEDURE — 94799 UNLISTED PULMONARY SVC/PX: CPT

## 2020-03-01 PROCEDURE — A4216 STERILE WATER/SALINE, 10 ML: HCPCS | Performed by: SURGERY

## 2020-03-01 PROCEDURE — C9113 INJ PANTOPRAZOLE SODIUM, VIA: HCPCS | Performed by: SURGERY

## 2020-03-01 PROCEDURE — 25000242 PHARM REV CODE 250 ALT 637 W/ HCPCS: Performed by: HOSPITALIST

## 2020-03-01 PROCEDURE — 99900035 HC TECH TIME PER 15 MIN (STAT)

## 2020-03-01 PROCEDURE — 63600175 PHARM REV CODE 636 W HCPCS

## 2020-03-01 PROCEDURE — 85027 COMPLETE CBC AUTOMATED: CPT

## 2020-03-01 PROCEDURE — 27000221 HC OXYGEN, UP TO 24 HOURS

## 2020-03-01 PROCEDURE — 25000003 PHARM REV CODE 250: Performed by: HOSPITALIST

## 2020-03-01 PROCEDURE — 85007 BL SMEAR W/DIFF WBC COUNT: CPT

## 2020-03-01 PROCEDURE — 80053 COMPREHEN METABOLIC PANEL: CPT

## 2020-03-01 PROCEDURE — 94640 AIRWAY INHALATION TREATMENT: CPT

## 2020-03-01 PROCEDURE — 25000003 PHARM REV CODE 250

## 2020-03-01 PROCEDURE — S0030 INJECTION, METRONIDAZOLE: HCPCS | Performed by: SURGERY

## 2020-03-01 PROCEDURE — 63600175 PHARM REV CODE 636 W HCPCS: Performed by: SURGERY

## 2020-03-01 PROCEDURE — 11000001 HC ACUTE MED/SURG PRIVATE ROOM

## 2020-03-01 PROCEDURE — 36415 COLL VENOUS BLD VENIPUNCTURE: CPT

## 2020-03-01 PROCEDURE — 94761 N-INVAS EAR/PLS OXIMETRY MLT: CPT

## 2020-03-01 PROCEDURE — 94664 DEMO&/EVAL PT USE INHALER: CPT

## 2020-03-01 RX ORDER — KETOROLAC TROMETHAMINE 30 MG/ML
15 INJECTION, SOLUTION INTRAMUSCULAR; INTRAVENOUS EVERY 6 HOURS
Status: COMPLETED | OUTPATIENT
Start: 2020-03-01 | End: 2020-03-01

## 2020-03-01 RX ORDER — LOSARTAN POTASSIUM AND HYDROCHLOROTHIAZIDE 12.5; 5 MG/1; MG/1
2 TABLET ORAL DAILY
Status: DISCONTINUED | OUTPATIENT
Start: 2020-03-01 | End: 2020-03-02 | Stop reason: HOSPADM

## 2020-03-01 RX ORDER — POTASSIUM CHLORIDE 7.45 MG/ML
10 INJECTION INTRAVENOUS
Status: COMPLETED | OUTPATIENT
Start: 2020-03-01 | End: 2020-03-01

## 2020-03-01 RX ADMIN — KETOROLAC TROMETHAMINE 15 MG: 30 INJECTION, SOLUTION INTRAMUSCULAR at 11:03

## 2020-03-01 RX ADMIN — POTASSIUM CHLORIDE 10 MEQ: 7.46 INJECTION, SOLUTION INTRAVENOUS at 10:03

## 2020-03-01 RX ADMIN — LEVALBUTEROL HYDROCHLORIDE 0.63 MG: 0.63 SOLUTION RESPIRATORY (INHALATION) at 08:03

## 2020-03-01 RX ADMIN — LOSARTAN POTASSIUM AND HYDROCHLOROTHIAZIDE 2 TABLET: 50; 12.5 TABLET, FILM COATED ORAL at 09:03

## 2020-03-01 RX ADMIN — LEVALBUTEROL HYDROCHLORIDE 0.63 MG: 0.63 SOLUTION RESPIRATORY (INHALATION) at 03:03

## 2020-03-01 RX ADMIN — POTASSIUM CHLORIDE 10 MEQ: 7.46 INJECTION, SOLUTION INTRAVENOUS at 11:03

## 2020-03-01 RX ADMIN — APIXABAN 5 MG: 5 TABLET, FILM COATED ORAL at 08:03

## 2020-03-01 RX ADMIN — LABETALOL HYDROCHLORIDE 200 MG: 100 TABLET, FILM COATED ORAL at 09:03

## 2020-03-01 RX ADMIN — POTASSIUM CHLORIDE 10 MEQ: 7.46 INJECTION, SOLUTION INTRAVENOUS at 02:03

## 2020-03-01 RX ADMIN — LABETALOL HYDROCHLORIDE 200 MG: 100 TABLET, FILM COATED ORAL at 08:03

## 2020-03-01 RX ADMIN — CIPROFLOXACIN 400 MG: 2 INJECTION, SOLUTION INTRAVENOUS at 11:03

## 2020-03-01 RX ADMIN — ACETAMINOPHEN ORAL SOLUTION 499.51 MG: 650 SOLUTION ORAL at 11:03

## 2020-03-01 RX ADMIN — Medication 10 ML: at 12:03

## 2020-03-01 RX ADMIN — Medication 10 ML: at 11:03

## 2020-03-01 RX ADMIN — PANTOPRAZOLE SODIUM 40 MG: 40 INJECTION, POWDER, FOR SOLUTION INTRAVENOUS at 09:03

## 2020-03-01 RX ADMIN — DULOXETINE HYDROCHLORIDE 60 MG: 30 CAPSULE, DELAYED RELEASE ORAL at 08:03

## 2020-03-01 RX ADMIN — KETOROLAC TROMETHAMINE 15 MG: 30 INJECTION, SOLUTION INTRAMUSCULAR at 01:03

## 2020-03-01 RX ADMIN — POTASSIUM CHLORIDE 10 MEQ: 7.46 INJECTION, SOLUTION INTRAVENOUS at 12:03

## 2020-03-01 RX ADMIN — KETOROLAC TROMETHAMINE 15 MG: 30 INJECTION, SOLUTION INTRAMUSCULAR at 05:03

## 2020-03-01 RX ADMIN — TRAZODONE HYDROCHLORIDE 50 MG: 50 TABLET ORAL at 09:03

## 2020-03-01 RX ADMIN — LEVALBUTEROL HYDROCHLORIDE 0.63 MG: 0.63 SOLUTION RESPIRATORY (INHALATION) at 11:03

## 2020-03-01 RX ADMIN — Medication 10 ML: at 05:03

## 2020-03-01 RX ADMIN — ACETAMINOPHEN ORAL SOLUTION 499.51 MG: 650 SOLUTION ORAL at 05:03

## 2020-03-01 RX ADMIN — Medication 10 ML: at 08:03

## 2020-03-01 RX ADMIN — APIXABAN 5 MG: 5 TABLET, FILM COATED ORAL at 09:03

## 2020-03-01 RX ADMIN — METRONIDAZOLE 500 MG: 500 INJECTION, SOLUTION INTRAVENOUS at 02:03

## 2020-03-01 RX ADMIN — DEXTROSE MONOHYDRATE, SODIUM CHLORIDE, AND POTASSIUM CHLORIDE: 50; 2.25; 1.49 INJECTION, SOLUTION INTRAVENOUS at 02:03

## 2020-03-01 RX ADMIN — ACETAMINOPHEN ORAL SOLUTION 499.51 MG: 650 SOLUTION ORAL at 01:03

## 2020-03-01 NOTE — PLAN OF CARE
Pt alert able to make needs known, alicia meds well,pain controlled by prn pain medication,iv abx remains in progress,no s/s adverse reaction noted,tool iv potassium,remains free from falls and injuries,appetite fair,safety,maintained,continue monitoring  Problem: Adult Inpatient Plan of Care  Goal: Plan of Care Review  Outcome: Ongoing, Progressing  Flowsheets (Taken 3/1/2020 1422)  Plan of Care Reviewed With: patient; mother  Goal: Patient-Specific Goal (Individualization)  Outcome: Ongoing, Progressing  Goal: Absence of Hospital-Acquired Illness or Injury  Outcome: Ongoing, Progressing  Intervention: Identify and Manage Fall Risk  Flowsheets (Taken 3/1/2020 1422)  Safety Promotion/Fall Prevention: assistive device/personal item within reach; bed alarm set; nonskid shoes/socks when out of bed; side rails raised x 2; high risk medications identified; medications reviewed; instructed to call staff for mobility  Goal: Optimal Comfort and Wellbeing  Outcome: Ongoing, Progressing  Goal: Readiness for Transition of Care  Outcome: Ongoing, Progressing  Goal: Rounds/Family Conference  Outcome: Ongoing, Progressing     Problem: Fall Injury Risk  Goal: Absence of Fall and Fall-Related Injury  Outcome: Ongoing, Progressing     Problem: Infection  Goal: Infection Symptom Resolution  Outcome: Ongoing, Progressing  Intervention: Prevent or Manage Infection  Flowsheets (Taken 3/1/2020 1422)  Infection Management: aseptic technique maintained     Problem: Skin Injury Risk Increased  Goal: Skin Health and Integrity  Outcome: Ongoing, Progressing  Intervention: Optimize Skin Protection  Flowsheets (Taken 3/1/2020 1422)  Pressure Reduction Techniques: frequent weight shift encouraged; weight shift assistance provided  Head of Bed (HOB): HOB at 30-45 degrees   .

## 2020-03-01 NOTE — SUBJECTIVE & OBJECTIVE
Interval History: pt has no new complaints     Review of Systems   Constitutional: Positive for activity change, appetite change and fatigue.   HENT: Negative.    Eyes: Negative.    Respiratory: Positive for shortness of breath.    Cardiovascular: Positive for leg swelling. Negative for chest pain.   Gastrointestinal: Positive for abdominal pain. Negative for nausea.   Endocrine: Negative.    Genitourinary: Positive for vaginal bleeding.   Musculoskeletal: Positive for gait problem.   Skin: Negative.    Neurological: Positive for weakness.   Psychiatric/Behavioral: Positive for dysphoric mood.     Objective:     Vital Signs (Most Recent):  Temp: 98.1 °F (36.7 °C) (03/01/20 1259)  Pulse: 78 (03/01/20 1527)  Resp: 20 (03/01/20 1527)  BP: 138/75 (03/01/20 1259)  SpO2: 99 % (03/01/20 1527) Vital Signs (24h Range):  Temp:  [97.6 °F (36.4 °C)-98.1 °F (36.7 °C)] 98.1 °F (36.7 °C)  Pulse:  [76-94] 78  Resp:  [18-20] 20  SpO2:  [95 %-100 %] 99 %  BP: (137-187)/(64-87) 138/75     Weight: (!) 208.5 kg (459 lb 10.6 oz)  Body mass index is 55.95 kg/m².    Intake/Output Summary (Last 24 hours) at 3/1/2020 1540  Last data filed at 3/1/2020 1212  Gross per 24 hour   Intake 4347.5 ml   Output --   Net 4347.5 ml      Physical Exam   Constitutional: She is oriented to person, place, and time. She appears well-developed and well-nourished. She appears distressed.   Super morbid obese   HENT:   Head: Normocephalic and atraumatic.   Mouth/Throat: Oropharynx is clear and moist.   Eyes: Pupils are equal, round, and reactive to light. EOM are normal.   Neck: Normal range of motion. Neck supple. No JVD present.   Cardiovascular: Regular rhythm and intact distal pulses.   Tachy s1 s2   Pulmonary/Chest: She has no wheezes. She has no rales.   Increased effort    Abdominal: She exhibits distension. There is tenderness. There is guarding.   Musculoskeletal: Normal range of motion. She exhibits edema.   Neurological: She is alert and oriented to  person, place, and time.   Skin: Skin is warm. Capillary refill takes 2 to 3 seconds. She is diaphoretic.   Psychiatric:   Flat affect       Significant Labs: All pertinent labs within the past 24 hours have been reviewed.    Significant Imaging: I have reviewed and interpreted all pertinent imaging results/findings within the past 24 hours.

## 2020-03-01 NOTE — PROGRESS NOTES
General Surgery Progress Note    Subjective/Interval HPI:  No acute events overnight. Feeling alright this morning. Had one episodes of nausea yesterday evening after drinking carbonated beverage. Tachycardia resolved. WBC normalized. Cr normalized. Passing flatus and BMs, tolerating diet otherwise. Eliquis restarted yesterday by medicine team.     Objective:    Vitals:    03/01/20 0915   BP: (!) 166/78   Pulse: 86   Resp:    Temp:          Intake/Output Summary (Last 24 hours) at 3/1/2020 1003  Last data filed at 3/1/2020 0700  Gross per 24 hour   Intake 4227.5 ml   Output --   Net 4227.5 ml       Physical exam:  General: Awake, NAD, obese  HEENT: NC, AT, EOMI  Neck: supple, no TTP  Chest: symmetric expansion, no distress, normal WOB  Cardiovascular: RRR  Abdomen: soft, mildly tender worst on left side. Non-distended. Incision c/d/i with stabples  Extremities: moves all, no edema  Neuro: Alert and Oriented  Skin: warm, dry        Imaging Results           CT Abdomen Pelvis  Without Contrast (Final result)  Result time 02/21/20 02:29:36    Final result by Chino Adame MD (02/21/20 02:29:36)                 Impression:      Abnormal findings referable to the colon concerning for the possibility of an obstructing malignant lesion involving the proximal aspect of the descending colon as detailed above.  Clinical and historical correlation is needed, further evaluation with colonoscopy or fluoroscopic barium examination is recommended.    There is distention of the right colon, transverse colon and proximal descending colon associated with the aforementioned, and there is mild edema/inflammation along the distal transverse colon, and splenic flexure.    Enlarged retroperitoneal lymph node on the right as discussed above.    Dilated small bowel loops are noted however extending to the terminal ileum and may relate to the colonic distention.    Thickened enlarged appearance of the appendix, to some degree may relate  to a baseline appearance although more prominent than on the prior study and therefore in part may relate to the small-bowel distention, primary periappendiceal inflammatory process not otherwise seen.    This report was flagged in Epic as abnormal.      Electronically signed by: Chino Adame  Date:    02/21/2020  Time:    02:29             Narrative:    EXAMINATION:  CT ABDOMEN PELVIS WITHOUT CONTRAST    CLINICAL HISTORY:  Abdominal pain, unspecified;    TECHNIQUE:  Low dose axial images, sagittal and coronal reformations were obtained from the lung bases to the pubic symphysis.  Intravenous contrast and oral contrast was not utilized.    COMPARISON:  December 4, 2016    FINDINGS:  The visualized lung bases demonstrate mild motion artifact and atelectatic change.  There is nonspecific mild-to-moderate distention of the stomach with fluid and air and ingested material.  Mild layering density of the gallbladder may relate to sludge and/or cholelithiasis, alternatively may relate to vicarious excretion of previously administered intravenous contrast, there is no evidence for pericholecystic or peripancreatic inflammatory change.  When accounting for limitations of the examination there is no evidence for acute process of the liver, spleen, or adrenal glands.  There is no hydronephrosis or obstructive uropathy bilaterally.  There is residual excreted contrast within the collecting structures of the kidneys, and the urinary bladder as well as along the right ureter.  The abdominal aorta appears normal in caliber otherwise not well evaluated on this examination.  Evaluation for adenopathy is limited on this examination however there are small retroperitoneal lymph nodes noted, there is also an enlarged lymph node seen just to the right of the IVC, overlying the right psoas muscle, and appearing just anterior to the right ovarian vein, as best seen on axial image 80 measuring approximately 14.9 x 22.7 mm in size.    The  urinary bladder demonstrates dense opacification with oral contrast, as unremarkable appearance however is not well evaluated, this obscures structures of the lower pelvis to some degree, evaluation of the uterus and adnexa is limited, there is no evidence for dominant adnexal mass or cystic collection.    There are dilated small bowel loops throughout the abdomen and pelvis, mild to moderate small bowel distention with fluid and air.  This extends to the level of the terminal ileum.  The appendix appears prominent and thickened particularly the distal appendix however this appearance is similar to the prior examination and may relate to variant anatomy although is somewhat more prominent, which could relate to the aforementioned bowel distention.  There is moderate to prominent distention involving the right colon, and transverse colon, with mild-to-moderate distention involving the splenic flexure, and proximal descending colon.  There is some mild pericolonic edema suggested along the distal transverse colon, and splenic flexure that may relate to mild superimposed inflammation/edema or colitis.  As best seen on coronal images 89 through 98, axial images 43 through 59 there is abrupt transition from dilated proximal descending colon to decompressed descending colon.  Adjacent to this there are multiple small lymph nodes, with an associated finding that may relate to an enlarged lymph node or cluster of smaller lymph nodes as seen on axial image 50 measuring up to approximately 15.7 by 22.7 mm.  The possibility that this represents an inflammatory process or a stricture is a consideration however the combined findings are most concerning for malignancy.  Clinical and historical correlation is needed if previously unknown further evaluation with colonoscopy or fluoroscopic barium examination is recommended.  The remainder of the descending colon and the rectosigmoid colon do not appear abnormally distended there is  some mild air and stool at the rectum and distal sigmoid colon.  There is no evidence for pneumatosis, there is no evidence for free intraperitoneal air, there is no evidence for mesenteric venous or portal venous air.  The visualized osseous structures appear intact.  Chronic changes are noted.                               X-Ray Abdomen Flat And Erect (Final result)  Result time 02/21/20 01:11:40    Final result by Chino Adame MD (02/21/20 01:11:40)                 Impression:      There are mildly prominent air-filled small bowel loops and colonic loops with some air-fluid levels of the small bowel and colon noted, there is relative paucity of air within the distal colon, this is nonspecific although the possibility of a distal obstructing process would be in the differential.  Clinical and historical correlation is otherwise needed.      Electronically signed by: Chino Adame  Date:    02/21/2020  Time:    01:11             Narrative:    EXAMINATION:  XR ABDOMEN FLAT AND ERECT    CLINICAL HISTORY:  Constipation, unspecified    TECHNIQUE:  Flat and erect AP views of the abdomen were performed.    COMPARISON:  February 18, 2020    FINDINGS:  Abdominal radiographic examination is submitted, 3 radiographs are submitted.  There is no evidence for free intraperitoneal air.  There is appearance thought to represent mild-to-moderate air-filled distention of the colon, predominantly the right colon and transverse colon and splenic flexure and the proximal to mid descending colon, relative paucity of air seen along the distal colon.  There are some prominent air-filled small bowel loops noted as well.  An on the erect view there appear to be some air-filled levels within the colon and small bowel.  Clinical and historical correlation is needed the possibility of a distal colonic obstructing process would be in the differential.    There is contrast density within the collecting structures of the kidneys and the  urinary bladder likely relating to recent CT examination.  Calcifications of the pelvis are nonspecific may relate to phleboliths.  The osseous structures demonstrate chronic change.                               US Lower Extremity Veins Left (Final result)  Result time 02/21/20 00:20:30    Final result by Britany Monzon MD (02/21/20 00:20:30)                 Impression:      Limited examination.  No evidence of deep venous thrombosis in the left lower extremity.    Possible Baker's cyst.      Electronically signed by: Britany Monzon  Date:    02/21/2020  Time:    00:20             Narrative:    EXAMINATION:  US LOWER EXTREMITY VEINS LEFT    CLINICAL HISTORY:  Pain in leg, unspecified    TECHNIQUE:  Duplex and color flow Doppler evaluation and graded compression of the left lower extremity veins was performed.    COMPARISON:  11/14/2018    FINDINGS:  Left thigh veins: The common femoral, femoral, popliteal, upper greater saphenous, and deep femoral veins are patent and free of thrombus. The veins are normally compressible and have normal phasic flow and augmentation response.    Left calf veins: The visualized calf veins are patent.    Contralateral CFV: The contralateral (right) common femoral vein is patent and free of thrombus.    Miscellaneous: Limited evaluation secondary to habitus.  There is a focal fluid collection measuring 2.8 x 0.9 x 2.2 cm.                               CTA Chest Non-Coronary (PE Study) (Final result)  Result time 02/20/20 21:58:29    Final result by Britany Monzon MD (02/20/20 21:58:29)                 Impression:      Suboptimal intravenous bolus.  No evidence of acute pulmonary embolism in the pulmonary trunk or main pulmonary arteries.      Electronically signed by: Britany Monzon  Date:    02/20/2020  Time:    21:58             Narrative:    EXAMINATION:  CT PULMONARY ANGIOGRAM WITH CONTRAST    CLINICAL HISTORY:  Chest pain.    TECHNIQUE:  CT of the chest with  intravenous contrast for pulmonary artery angiogram was performed. Contiguous axial 1.25 mm images followed by 10 mm reconstructions with multiplanar and MIP reformations of the pulmonary arteries. No 3D post-angiographic imaging was performed on an independent workstation and reviewed.  75 ml of Omnipaque 350 was injected.    COMPARISON:  08/27/2018    FINDINGS:  A suboptimal intravenous bolus is present.  There is no evidence of pulmonary artery filling defect to suggest pulmonary embolism in the pulmonary trunk or main pulmonary arteries.  There is no aortic aneurysm or aortic dissection.    Moderate bibasilar atelectasis or scarring is present.    There is no evidence of mediastinal, hilar, or axillary adenopathy.    There is no pleural or pericardial effusion.    The heart size is within normal limits.                                  Assessment: 39 y.o. female s/p partial segmental colectomy and primary anastomosis.  Tolerating PO and having bowel movements. Remains tachycardic now with increased WBC. CTA chest was non-diagnostic for PE. Having multiple stools and tolerating diet. However given increased WBC and tachycardia she underwent CT scan with PO contrast showing free air and fluid, but no contrast extravasation or anastomotic leak. Patient has since improved. Tolerating diet, passing BMs. Tachycardia resolved. WBC normalized. Cr normalized. Found to have UTI growing Klebsiella. Eliquis restarted by medicine team.      Plan:  -continue diet, will add protein shakes TID  -reduce IVF  -electrolyte repletion  -add toradol 15mg x3 q6hrs  -d/c flagyl, continue Cipro for her UTI        Ravi Albright, PGYIII  [facility] Surgery Resident

## 2020-03-01 NOTE — PROGRESS NOTES
[facility] Medicine  Progress Note    Patient Name: Maryam Oneal  MRN: [id number]  Patient Class: IP- Inpatient   Admission Date: 2/20/2020  Length of Stay: 9 days  Attending Physician: Dariel Mcginnis MD  Primary Care Provider: Phuong Reynoso MD        Subjective:     Principal Problem:Large bowel obstruction        HPI:  38yo F with two months history of cramping pain and poor appetite found to have large bowel obstruction on CT with obstructing colon mass. Underwent c/scope and partial colectomy with anastomosis 2/23.  Path: high dysplasia concerning for adenocarcinoma. Hospital medicine consulted for tachycardia. Pt has h/o PE and has been off eliquis for awhile.     DVT study negative  Chest CTA pending   Pt having BMs, advanced diet    D/w primary team, Dr. Liang- pt has some vaginal vs rectal bleeding - will proceed with anticoagulation if positive study    Overview/Hospital Course:  Pt s/p partial colectomy with anastomosis 2/23 and tachycardia. Pt underwent CTA chest and was not found to have acute PE, though intraperitoneal air present. Pt having BMs and tolerating diet. HR slightly improved from 140 to 110s. Being worked up with UA and started on antibiotics.     2/28- resume eliquis BID, add BB for HR and BP control   3/1- Pt BP and HR controlled  Test for home O2 needs, SW - dc planning- PCP and surgery follow up appt    No new subjective & objective note has been filed under this hospital service since the last note was generated.      Assessment/Plan:      * Large bowel obstruction  S/p partial colectomy with anastomosis.   - per primary       Paroxysmal tachycardia  Concern for PE - h/o DVT and has been off eliquis  DVT study negative  Chest CTA:  Impression:       1. Moderate volume intraperitoneal free air.  Overall volume of air is greater than would be expected even in a recent postoperative setting.  Potential perforation not excluded.  Small amount of peritoneal  free fluid also seen.  Close follow-up is recommended.  2. Bilateral lower lobe consolidative changes which may reflect atelectasis and/or pneumonia.  3. Small left pleural effusion.  4. Nondiagnostic CTA for evaluation of potential pulmonary embolus.          ECHo - TDS, EF 70%  TSH - normal range   Possibly pain induced - pt reports not controlled  Anxiety - resume home meds    Trial low dose IV metoprolol if HR >115 and BP can tolerate  Agree with antibiotics  Nebs  acapella  Mucolytic    With a non-diagnostic study, her Well's criteria puts her at 40.6% likelihood of PE; would recommend heparin infusion if there is a potential for further surgeries; if not, place back on eliquis.     Add labetalol             Abdominal pain    See above    Major depressive disorder, recurrent episode, severe  Resume home medications      Anemia of chronic disease  In setting of colon mass  Type and screen blood  Transfuse if h/h continues to drop       VTE Risk Mitigation (From admission, onward)         Ordered     apixaban tablet 5 mg  2 times daily      02/29/20 1209     IP VTE HIGH RISK PATIENT  Once      02/21/20 0558     Place FLETCHER hose  Until discontinued      02/21/20 0558     Place sequential compression device  Until discontinued      02/21/20 0558                      Charmaine Campbell MD  Department of Hospital Medicine   [facility]

## 2020-03-01 NOTE — PROGRESS NOTES
[facility] [place] [place] Medicine  Progress Note    Patient Name: Maryam Oneal  MRN: [id number]  Patient Class: IP- Inpatient   Admission Date: 2/20/2020  Length of Stay: 9 days  Attending Physician: Dariel Mcginnis MD  Primary Care Provider: Phuong Reynoso MD        Subjective:     Principal Problem:Large bowel obstruction        HPI:  40yo F with two months history of cramping pain and poor appetite found to have large bowel obstruction on CT with obstructing colon mass. Underwent c/scope and partial colectomy with anastomosis 2/23.  Path: high dysplasia concerning for adenocarcinoma. Hospital medicine consulted for tachycardia. Pt has h/o PE and has been off eliquis for awhile.     DVT study negative  Chest CTA pending   Pt having BMs, advanced diet    D/w primary team, Dr. Liang- pt has some vaginal vs rectal bleeding - will proceed with anticoagulation if positive study    Overview/Hospital Course:  Pt s/p partial colectomy with anastomosis 2/23 and tachycardia. Pt underwent CTA chest and was not found to have acute PE, though intraperitoneal air present. Pt having BMs and tolerating diet. HR slightly improved from 140 to 110s. Being worked up with UA and started on antibiotics.     2/28- resume eliquis BID, add BB for HR and BP control   3/1- Pt BP and HR controlled  Test for home O2 needs, SW - dc planning- PCP and surgery follow up appt    Interval History: pt has no new complaints     Review of Systems   Constitutional: Positive for activity change, appetite change and fatigue.   HENT: Negative.    Eyes: Negative.    Respiratory: Positive for shortness of breath.    Cardiovascular: Positive for leg swelling. Negative for chest pain.   Gastrointestinal: Positive for abdominal pain. Negative for nausea.   Endocrine: Negative.    Genitourinary: Positive for vaginal bleeding.   Musculoskeletal: Positive for gait problem.   Skin: Negative.    Neurological: Positive for weakness.    Psychiatric/Behavioral: Positive for dysphoric mood.     Objective:     Vital Signs (Most Recent):  Temp: 98.1 °F (36.7 °C) (03/01/20 1259)  Pulse: 78 (03/01/20 1527)  Resp: 20 (03/01/20 1527)  BP: 138/75 (03/01/20 1259)  SpO2: 99 % (03/01/20 1527) Vital Signs (24h Range):  Temp:  [97.6 °F (36.4 °C)-98.1 °F (36.7 °C)] 98.1 °F (36.7 °C)  Pulse:  [76-94] 78  Resp:  [18-20] 20  SpO2:  [95 %-100 %] 99 %  BP: (137-187)/(64-87) 138/75     Weight: (!) 208.5 kg (459 lb 10.6 oz)  Body mass index is 55.95 kg/m².    Intake/Output Summary (Last 24 hours) at 3/1/2020 1540  Last data filed at 3/1/2020 1212  Gross per 24 hour   Intake 4347.5 ml   Output --   Net 4347.5 ml      Physical Exam   Constitutional: She is oriented to person, place, and time. She appears well-developed and well-nourished. She appears distressed.   Super morbid obese   HENT:   Head: Normocephalic and atraumatic.   Mouth/Throat: Oropharynx is clear and moist.   Eyes: Pupils are equal, round, and reactive to light. EOM are normal.   Neck: Normal range of motion. Neck supple. No JVD present.   Cardiovascular: Regular rhythm and intact distal pulses.   Tachy s1 s2   Pulmonary/Chest: She has no wheezes. She has no rales.   Increased effort    Abdominal: She exhibits distension. There is tenderness. There is guarding.   Musculoskeletal: Normal range of motion. She exhibits edema.   Neurological: She is alert and oriented to person, place, and time.   Skin: Skin is warm. Capillary refill takes 2 to 3 seconds. She is diaphoretic.   Psychiatric:   Flat affect       Significant Labs: All pertinent labs within the past 24 hours have been reviewed.    Significant Imaging: I have reviewed and interpreted all pertinent imaging results/findings within the past 24 hours.      Assessment/Plan:      * Large bowel obstruction  S/p partial colectomy with anastomosis.   - per primary       Paroxysmal tachycardia  Concern for PE - h/o DVT and has been off eliquis  DVT study  negative  Chest CTA:  Impression:       1. Moderate volume intraperitoneal free air.  Overall volume of air is greater than would be expected even in a recent postoperative setting.  Potential perforation not excluded.  Small amount of peritoneal free fluid also seen.  Close follow-up is recommended.  2. Bilateral lower lobe consolidative changes which may reflect atelectasis and/or pneumonia.  3. Small left pleural effusion.  4. Nondiagnostic CTA for evaluation of potential pulmonary embolus.          ECHo - TDS, EF 70%  TSH - normal range   Possibly pain induced - pt reports not controlled  Anxiety - resume home meds    Trial low dose IV metoprolol if HR >115 and BP can tolerate  Agree with antibiotics  Nebs  acapella  Mucolytic    With a non-diagnostic study, her Well's criteria puts her at 40.6% likelihood of PE; would recommend heparin infusion if there is a potential for further surgeries; if not, place back on eliquis.     Add labetalol             Abdominal pain    See above    Major depressive disorder, recurrent episode, severe  Resume home medications      Anemia of chronic disease  In setting of colon mass  Type and screen blood  Transfuse if h/h continues to drop         VTE Risk Mitigation (From admission, onward)         Ordered     apixaban tablet 5 mg  2 times daily      02/29/20 1209     IP VTE HIGH RISK PATIENT  Once      02/21/20 0558     Place FLETCHER hose  Until discontinued      02/21/20 0558     Place sequential compression device  Until discontinued      02/21/20 0558                      Charmaine Campbell MD  Department of Hospital Medicine   [facility]

## 2020-03-01 NOTE — PLAN OF CARE
Patient alert and oriented x4. Respirations even and unlabored. 02 noted at 2l.  No distress noted. Skin warm and dry. Midline staples noted to abdomen. Picc line noted to right upper arm. Iv fluids infusing as ordered. Iv antibiotics given as ordered. Patient denies need for pain medication. Scheduled tylenol given per orders. Patient has no complaints at this time. Instructed to call for any needs. Bed in lowest position. Call bell within reach. Will continue to monitor.     Problem: Adult Inpatient Plan of Care  Goal: Plan of Care Review  Outcome: Ongoing, Progressing     Problem: Fall Injury Risk  Goal: Absence of Fall and Fall-Related Injury  Outcome: Ongoing, Progressing  Intervention: Identify and Manage Contributors to Fall Injury Risk  Flowsheets (Taken 3/1/2020 0411)  Self-Care Promotion: independence encouraged; BADL personal objects within reach  Medication Review/Management: medications reviewed; high risk medications identified  Intervention: Promote Injury-Free Environment  Flowsheets (Taken 3/1/2020 0411)  Safety Promotion/Fall Prevention: assistive device/personal item within reach; Fall Risk signage in place; high risk medications identified; Fall Risk reviewed with patient/family; side rails raised x 2  Environmental Safety Modification: assistive device/personal items within reach; clutter free environment maintained     Problem: Skin Injury Risk Increased  Goal: Skin Health and Integrity  Outcome: Ongoing, Progressing  Intervention: Optimize Skin Protection  Flowsheets (Taken 3/1/2020 0411)  Pressure Reduction Techniques: frequent weight shift encouraged  Pressure Reduction Devices: heel offloading device utilized  Head of Bed (HOB): HOB elevated

## 2020-03-02 ENCOUNTER — TELEPHONE (OUTPATIENT)
Dept: SURGERY | Facility: CLINIC | Age: 40
End: 2020-03-02

## 2020-03-02 VITALS
TEMPERATURE: 98 F | HEIGHT: 72 IN | RESPIRATION RATE: 17 BRPM | BODY MASS INDEX: 39.68 KG/M2 | OXYGEN SATURATION: 96 % | DIASTOLIC BLOOD PRESSURE: 76 MMHG | HEART RATE: 96 BPM | SYSTOLIC BLOOD PRESSURE: 142 MMHG | WEIGHT: 293 LBS

## 2020-03-02 LAB
ABO + RH BLD: NORMAL
ALBUMIN SERPL BCP-MCNC: 1.8 G/DL (ref 3.5–5.2)
ALP SERPL-CCNC: 56 U/L (ref 55–135)
ALT SERPL W/O P-5'-P-CCNC: 14 U/L (ref 10–44)
ANION GAP SERPL CALC-SCNC: 8 MMOL/L (ref 8–16)
AST SERPL-CCNC: 19 U/L (ref 10–40)
BASOPHILS # BLD AUTO: ABNORMAL K/UL (ref 0–0.2)
BASOPHILS NFR BLD: 0 % (ref 0–1.9)
BILIRUB SERPL-MCNC: 0.3 MG/DL (ref 0.1–1)
BLD GP AB SCN CELLS X3 SERPL QL: NORMAL
BLD PROD TYP BPU: NORMAL
BLD PROD TYP BPU: NORMAL
BLOOD UNIT EXPIRATION DATE: NORMAL
BLOOD UNIT EXPIRATION DATE: NORMAL
BLOOD UNIT TYPE CODE: 7300
BLOOD UNIT TYPE CODE: 7300
BLOOD UNIT TYPE: NORMAL
BLOOD UNIT TYPE: NORMAL
BUN SERPL-MCNC: 9 MG/DL (ref 6–20)
CALCIUM SERPL-MCNC: 8.3 MG/DL (ref 8.7–10.5)
CHLORIDE SERPL-SCNC: 101 MMOL/L (ref 95–110)
CO2 SERPL-SCNC: 23 MMOL/L (ref 23–29)
CODING SYSTEM: NORMAL
CODING SYSTEM: NORMAL
CREAT SERPL-MCNC: 0.9 MG/DL (ref 0.5–1.4)
DIFFERENTIAL METHOD: ABNORMAL
DISPENSE STATUS: NORMAL
DISPENSE STATUS: NORMAL
EOSINOPHIL # BLD AUTO: ABNORMAL K/UL (ref 0–0.5)
EOSINOPHIL NFR BLD: 1 % (ref 0–8)
ERYTHROCYTE [DISTWIDTH] IN BLOOD BY AUTOMATED COUNT: 16.8 % (ref 11.5–14.5)
EST. GFR  (AFRICAN AMERICAN): >60 ML/MIN/1.73 M^2
EST. GFR  (NON AFRICAN AMERICAN): >60 ML/MIN/1.73 M^2
GLUCOSE SERPL-MCNC: 116 MG/DL (ref 70–110)
HCT VFR BLD AUTO: 24.1 % (ref 37–48.5)
HGB BLD-MCNC: 7.7 G/DL (ref 12–16)
HYPOCHROMIA BLD QL SMEAR: ABNORMAL
IMM GRANULOCYTES # BLD AUTO: ABNORMAL K/UL (ref 0–0.04)
IMM GRANULOCYTES NFR BLD AUTO: ABNORMAL % (ref 0–0.5)
LYMPHOCYTES # BLD AUTO: ABNORMAL K/UL (ref 1–4.8)
LYMPHOCYTES NFR BLD: 16 % (ref 18–48)
MCH RBC QN AUTO: 26.1 PG (ref 27–31)
MCHC RBC AUTO-ENTMCNC: 32 G/DL (ref 32–36)
MCV RBC AUTO: 82 FL (ref 82–98)
METAMYELOCYTES NFR BLD MANUAL: 1 %
MONOCYTES # BLD AUTO: ABNORMAL K/UL (ref 0.3–1)
MONOCYTES NFR BLD: 7 % (ref 4–15)
MYELOCYTES NFR BLD MANUAL: 1 %
NEUTROPHILS # BLD AUTO: ABNORMAL K/UL (ref 1.8–7.7)
NEUTROPHILS NFR BLD: 72 % (ref 38–73)
NEUTS BAND NFR BLD MANUAL: 2 %
NRBC BLD-RTO: 0 /100 WBC
PLATELET # BLD AUTO: 298 K/UL (ref 150–350)
PLATELET BLD QL SMEAR: ABNORMAL
PMV BLD AUTO: 9.6 FL (ref 9.2–12.9)
POTASSIUM SERPL-SCNC: 3.2 MMOL/L (ref 3.5–5.1)
PROT SERPL-MCNC: 6 G/DL (ref 6–8.4)
RBC # BLD AUTO: 2.95 M/UL (ref 4–5.4)
SODIUM SERPL-SCNC: 132 MMOL/L (ref 136–145)
TRANS ERYTHROCYTES VOL PATIENT: NORMAL ML
TRANS ERYTHROCYTES VOL PATIENT: NORMAL ML
WBC # BLD AUTO: 8.58 K/UL (ref 3.9–12.7)

## 2020-03-02 PROCEDURE — 25000003 PHARM REV CODE 250: Performed by: SURGERY

## 2020-03-02 PROCEDURE — 25000242 PHARM REV CODE 250 ALT 637 W/ HCPCS: Performed by: HOSPITALIST

## 2020-03-02 PROCEDURE — 90670 PCV13 VACCINE IM: CPT | Performed by: SURGERY

## 2020-03-02 PROCEDURE — 25000003 PHARM REV CODE 250: Performed by: HOSPITALIST

## 2020-03-02 PROCEDURE — 36430 TRANSFUSION BLD/BLD COMPNT: CPT

## 2020-03-02 PROCEDURE — C9113 INJ PANTOPRAZOLE SODIUM, VIA: HCPCS | Performed by: SURGERY

## 2020-03-02 PROCEDURE — 63600175 PHARM REV CODE 636 W HCPCS: Performed by: SURGERY

## 2020-03-02 PROCEDURE — 36415 COLL VENOUS BLD VENIPUNCTURE: CPT

## 2020-03-02 PROCEDURE — 85027 COMPLETE CBC AUTOMATED: CPT

## 2020-03-02 PROCEDURE — 94640 AIRWAY INHALATION TREATMENT: CPT

## 2020-03-02 PROCEDURE — 25000003 PHARM REV CODE 250

## 2020-03-02 PROCEDURE — 63600175 PHARM REV CODE 636 W HCPCS: Performed by: STUDENT IN AN ORGANIZED HEALTH CARE EDUCATION/TRAINING PROGRAM

## 2020-03-02 PROCEDURE — 90472 IMMUNIZATION ADMIN EACH ADD: CPT | Performed by: SURGERY

## 2020-03-02 PROCEDURE — 86920 COMPATIBILITY TEST SPIN: CPT

## 2020-03-02 PROCEDURE — 63700000 PHARM REV CODE 250 ALT 637 W/O HCPCS

## 2020-03-02 PROCEDURE — 94761 N-INVAS EAR/PLS OXIMETRY MLT: CPT

## 2020-03-02 PROCEDURE — 94664 DEMO&/EVAL PT USE INHALER: CPT

## 2020-03-02 PROCEDURE — 85007 BL SMEAR W/DIFF WBC COUNT: CPT

## 2020-03-02 PROCEDURE — 25000003 PHARM REV CODE 250: Performed by: STUDENT IN AN ORGANIZED HEALTH CARE EDUCATION/TRAINING PROGRAM

## 2020-03-02 PROCEDURE — 99900035 HC TECH TIME PER 15 MIN (STAT)

## 2020-03-02 PROCEDURE — 97535 SELF CARE MNGMENT TRAINING: CPT

## 2020-03-02 PROCEDURE — A4216 STERILE WATER/SALINE, 10 ML: HCPCS | Performed by: SURGERY

## 2020-03-02 PROCEDURE — 90471 IMMUNIZATION ADMIN: CPT | Performed by: SURGERY

## 2020-03-02 PROCEDURE — P9021 RED BLOOD CELLS UNIT: HCPCS

## 2020-03-02 PROCEDURE — 80053 COMPREHEN METABOLIC PANEL: CPT

## 2020-03-02 PROCEDURE — 63600175 PHARM REV CODE 636 W HCPCS

## 2020-03-02 PROCEDURE — 86850 RBC ANTIBODY SCREEN: CPT

## 2020-03-02 PROCEDURE — 90686 IIV4 VACC NO PRSV 0.5 ML IM: CPT | Performed by: SURGERY

## 2020-03-02 RX ORDER — HYDROCODONE BITARTRATE AND ACETAMINOPHEN 500; 5 MG/1; MG/1
TABLET ORAL
Status: DISCONTINUED | OUTPATIENT
Start: 2020-03-02 | End: 2020-03-02 | Stop reason: HOSPADM

## 2020-03-02 RX ORDER — OXYCODONE AND ACETAMINOPHEN 5; 325 MG/1; MG/1
1 TABLET ORAL EVERY 4 HOURS PRN
Qty: 20 TABLET | Refills: 0 | OUTPATIENT
Start: 2020-03-02 | End: 2020-03-05

## 2020-03-02 RX ORDER — FUROSEMIDE 10 MG/ML
40 INJECTION INTRAMUSCULAR; INTRAVENOUS ONCE
Status: COMPLETED | OUTPATIENT
Start: 2020-03-02 | End: 2020-03-02

## 2020-03-02 RX ORDER — POTASSIUM CHLORIDE 20 MEQ/1
40 TABLET, EXTENDED RELEASE ORAL ONCE
Status: COMPLETED | OUTPATIENT
Start: 2020-03-02 | End: 2020-03-02

## 2020-03-02 RX ADMIN — PANTOPRAZOLE SODIUM 40 MG: 40 INJECTION, POWDER, FOR SOLUTION INTRAVENOUS at 09:03

## 2020-03-02 RX ADMIN — LABETALOL HYDROCHLORIDE 200 MG: 100 TABLET, FILM COATED ORAL at 09:03

## 2020-03-02 RX ADMIN — CIPROFLOXACIN 400 MG: 2 INJECTION, SOLUTION INTRAVENOUS at 11:03

## 2020-03-02 RX ADMIN — PNEUMOCOCCAL 13-VALENT CONJUGATE VACCINE 0.5 ML: 2.2; 2.2; 2.2; 2.2; 2.2; 4.4; 2.2; 2.2; 2.2; 2.2; 2.2; 2.2; 2.2 INJECTION, SUSPENSION INTRAMUSCULAR at 07:03

## 2020-03-02 RX ADMIN — ACETAMINOPHEN ORAL SOLUTION 499.51 MG: 650 SOLUTION ORAL at 05:03

## 2020-03-02 RX ADMIN — INFLUENZA VIRUS VACCINE 0.5 ML: 15; 15; 15; 15 SUSPENSION INTRAMUSCULAR at 07:03

## 2020-03-02 RX ADMIN — DEXTROSE MONOHYDRATE, SODIUM CHLORIDE, AND POTASSIUM CHLORIDE: 50; 2.25; 1.49 INJECTION, SOLUTION INTRAVENOUS at 12:03

## 2020-03-02 RX ADMIN — LOSARTAN POTASSIUM AND HYDROCHLOROTHIAZIDE 2 TABLET: 50; 12.5 TABLET, FILM COATED ORAL at 09:03

## 2020-03-02 RX ADMIN — FUROSEMIDE 40 MG: 10 INJECTION, SOLUTION INTRAVENOUS at 09:03

## 2020-03-02 RX ADMIN — ACETAMINOPHEN ORAL SOLUTION 499.51 MG: 650 SOLUTION ORAL at 06:03

## 2020-03-02 RX ADMIN — ACETAMINOPHEN ORAL SOLUTION 499.51 MG: 650 SOLUTION ORAL at 11:03

## 2020-03-02 RX ADMIN — Medication 10 ML: at 05:03

## 2020-03-02 RX ADMIN — OXYCODONE HYDROCHLORIDE 10 MG: 5 SOLUTION ORAL at 09:03

## 2020-03-02 RX ADMIN — APIXABAN 5 MG: 5 TABLET, FILM COATED ORAL at 09:03

## 2020-03-02 RX ADMIN — APIXABAN 5 MG: 5 TABLET, FILM COATED ORAL at 08:03

## 2020-03-02 RX ADMIN — LEVALBUTEROL HYDROCHLORIDE 0.63 MG: 0.63 SOLUTION RESPIRATORY (INHALATION) at 07:03

## 2020-03-02 RX ADMIN — DULOXETINE HYDROCHLORIDE 60 MG: 30 CAPSULE, DELAYED RELEASE ORAL at 09:03

## 2020-03-02 RX ADMIN — Medication 10 ML: at 06:03

## 2020-03-02 RX ADMIN — TRAZODONE HYDROCHLORIDE 50 MG: 50 TABLET ORAL at 08:03

## 2020-03-02 RX ADMIN — POTASSIUM CHLORIDE 40 MEQ: 1500 TABLET, EXTENDED RELEASE ORAL at 09:03

## 2020-03-02 RX ADMIN — Medication 10 ML: at 11:03

## 2020-03-02 NOTE — PT/OT/SLP PROGRESS
Occupational Therapy   Treatment    Name: Maryam Oneal  MRN: [id number]  Admitting Diagnosis:  Large bowel obstruction  8 Days Post-Op    Recommendations:     Discharge Recommendations: home(with family assist)  Discharge Equipment Recommendations:  walker, rolling, raised toilet  Barriers to discharge:  None    Assessment:     Maryam Oneal is a 39 y.o. female with a medical diagnosis of Large bowel obstruction.  Performance deficits affecting function are weakness, impaired self care skills, decreased safety awareness, impaired functional mobilty, gait instability, edema, impaired skin.   OT treatment was limited by patient just returned to bed after using the BSC. The nurse was present and stated the patient was given a Purewick after receiving Lasix and is scheduled to received 2 units of blood prior to D/C to home today. The patient was agreeable to Home Safety Education. The patient and mother demonstrated good understanding of home safety and DME recommendations. The patient was given handout for Home safety. The patient's mother states she is able to assist the patient as needed at home. The patient is expected to D/C to home today with the assistance of her mother.    Rehab Prognosis:  Good; patient would benefit from acute skilled OT services to address these deficits and reach maximum level of function.       Plan:     Patient to be seen 3 x/week to address the above listed problems via self-care/home management, therapeutic activities, therapeutic exercises  · Plan of Care Expires: 03/10/20  · Plan of Care Reviewed with: patient, mother    Subjective     Pain/Comfort:  · Pain Rating 1: (did not c/o pain at rest)    Objective:     Communicated with: Tanna vallecillo prior to session.  Patient found HOB elevated with telemetry, peripheral IV and Purewick upon OT entry to room.    General Precautions: Standard, fall(abdominal incision)   Orthopedic Precautions:N/A   Braces: N/A     Occupational Performance:      Functional Mobility/Transfers:  · Functional Mobility: The patient declined OOB activities 2* amb to the sink/BSC with the (S) of her mother. The patient reports using a RW to amb within the room.    Activities of Daily Living:  · The patient reports transfering to the BSC with the (S) of her mother. The patient was educated re: use of a raised toilet seat vs a BSC for toileting at home. The patient's mother also verbalized understanding an plans to purchase a grab bar for her tub.      [facility] 6 Click ADL: 18    Treatment & Education:  The patient and mother were educated re: Home safety, fall prevention and DME recommendations for home. The patient will D/C to her mother's house where there are 2 ARTURO with B HR into a SS house. The mother verbalized understanding of DME needs and assistance needed at home. The patient was given a Home Safety handout.    Patient left HOB elevated with all lines intact, call button in reach and mother and nurse, Tanna presentEducation:      GOALS:   Multidisciplinary Problems     Occupational Therapy Goals        Problem: Occupational Therapy Goal    Goal Priority Disciplines Outcome Interventions   Occupational Therapy Goal     OT, PT/OT Ongoing, Progressing    Description:  Goals to be met by: 3/10/20    Patient will increase functional independence with ADLs by performing:    UE Dressing with Modified [place].  LE Dressing with Stand-by Assistance and Assistive Devices as needed.  Grooming while standing at sink with Modified [place] and Supervision.  Toileting from bedside commode with Modified [place] and Supervision for hygiene and clothing management.   Rolling to Bilateral with Modified [place].   Supine to sit with Modified [place].  Step transfer with Modified [place]  Toilet transfer to bedside commode with Modified [place] and Supervision.  Upper extremity exercise program x15 reps per handout, with independence.  Educate the patient  re: log roll technique                      Time Tracking:     OT Date of Treatment: 03/02/20  OT Start Time: 0940  OT Stop Time: 0955  OT Total Time (min): 15 min    Billable Minutes:Self Care/Home Management 15    Sophie San OT  3/2/2020

## 2020-03-02 NOTE — PROGRESS NOTES
1104 TN sent a message to Dr. Patel's staff via [employer] to schedule an appt in 2 weeks and place in the AVS; awaiting appt.    1115 TN contacted [facility]; spoke with staff to schedule a hospital f/u with Dr. Reynoso; was told pt goes to [facility]. Was transferred, spoke with Gilles who stated pt sees NP, Tari Loyd. Appt scheduled on 03/09/20 at 11:00 AM.    1121 TN sent another message to Dr. Patel's staff to contact the pt with the appt in 2 weeks.    1130 TN contacted floor nurse, Tanna, who stated pt will be gettng 2 units of blood and will be at the hospital. Did inform therapy were going to recommend DME.    TN attempted to contact therapy to inquire of what DME, no answer, will continue to contact.    1300 TN met with pt's mother, informed of pt will receive the bariatric RW but could not be accommodated with the raised toilet seat due to none available in bariatric. Explained RW will be delivered to pt's room.    1640 Bariatric RW delivered to pt's room.

## 2020-03-02 NOTE — PLAN OF CARE
Problem: Occupational Therapy Goal  Goal: Occupational Therapy Goal  Description  Goals to be met by: 3/10/20    Patient will increase functional independence with ADLs by performing:    UE Dressing with Modified [place].  LE Dressing with Stand-by Assistance and Assistive Devices as needed.  Grooming while standing at sink with Modified [place] and Supervision.  Toileting from bedside commode with Modified [place] and Supervision for hygiene and clothing management.   Rolling to Bilateral with Modified [place].   Supine to sit with Modified [place].  Step transfer with Modified [place]  Toilet transfer to bedside commode with Modified [place] and Supervision.  Upper extremity exercise program x15 reps per handout, with independence.  Educate the patient re: log roll technique     Outcome: Ongoing, Progressing  The patient is transferring to the [facility] with nursing or family assistance. The patient states she is anxious for D/C to home later today. The patient's mother is aware of the patient's need for assistance and is able to provide help.  The patient will benefit from a RW and a raised toilet seat hor home.

## 2020-03-02 NOTE — NURSING
1st unit of PRBC tranfusion completed.  Patient tolerated well.  No SSx of reaction noted.  2nd unit of PRBC started.

## 2020-03-02 NOTE — PROGRESS NOTES
Follow-up Information     Go to [facility].    Specialty:  Emergency Medicine  Why:  As needed, if symptoms worsen, or any other concerns  Contact information:  [address]  [place] [address] [phone]           Sky Patel MD In 2 weeks.    Specialties:  General Surgery, Oncology  Why:  Outpatient Services Surgery Follow-Up Clinic Nurse will contact the patient with an appaointent in 2 weeks.  Contact information:  [address]  [address]  [facility] [address]  [phone]             GIGI Rodriguez On 3/9/2020.    Specialty:  Family Medicine  Why:  Outpatient Services PCP Follow-Up at [facility] with NP, Monday at 11:00 AM  Contact information:  [address]  [address]  [facility] [id number] [phone]               PLEASE BRING TO ALL FOLLOW UP APPOINTMENTS:   1) A COPY YOUR DISCHARGE INSTRUCTIONS   2) ALL MEDICINES YOU ARE CURRENTLY TAKING IN THEIR ORIGINAL BOTTLES   3) IDENTIFICATION CARD   4) INSURANCE CARD    **PLEASE ARRIVE 15 MINUTES AHEAD OF SCHEDULED APPOINTMENT TIME   ++PLEASE CALL 24 HOURS IN ADVANCE IF YOU MUST RESCHEDULE YOUR APPOINTMENT DAY AND/OR TIME     [facility] CARE MANAGEMENT WRITTEN DISCHARGE INFORMATION    APPOINTMENTS AND RESOURCES TO HELP YOU MANAGE YOUR CARE AT HOME BASED ON YOUR PREFERENCES:  (If an appointment is not scheduled for you when you leave the hospital, call your doctor to schedule a follow up visit within a week)        Healthy Living Instructions to HELP MANAGE YOUR CARE AT HOME:  Things You are responsible for:  1.    Getting your prescriptions filled   2.    Taking your medications as directed, DO NOT MISS ANY DOSES!  3.    Following the diet and exercise recommended by your doctor  4.    Going to your follow-up doctor appointment. This is important because it allows the doctor to monitor your progress and determine if any changes need to made to your treatment plan.  5. If you have any questions  about MANAGING YOUR CARE AT HOME Call the Nurse Care Line for 24/7 Assistance [phone]       Please answer any calls you may receive from [facility]. We want to continue to support you as you manage your healthcare needs. [facility] is happy to have the opportunity to serve you.      Thank you for choosing [facility] for your healthcare needs!  Your [facility] Case Management Team,    MAGGY Peres  Registered Nurse Transition Navigator  [phone]

## 2020-03-02 NOTE — PT/OT/SLP PROGRESS
Physical Therapy      Patient Name:  Maryam Oneal   MRN:  [id number]    Patient not seen today secondary to Patient unwilling to participate, Patient fatigue. Pt reports she already got OOB of bed and doesn't want to do it again.  She reports she is going home today and wants to lay in bed until then.  Despite encouragement pt declined and mother declined for pt reporting she has already been OOB and did fine with it.  Pt received handouts on HEP(omitted bridges and SLRs due to abdominal precautions), log rolling, and energy conservation.  Pt was educated on all of the aforementioned.  Answered all questions/concerns prior to pt discharging today. Will follow-up as able.    Linette Nunez, PTA

## 2020-03-02 NOTE — TELEPHONE ENCOUNTER
Reached out to pt didn't receive an answer pt appt is scheduled for 3/9/2020 for 2:00 pm with          ----- Message from Bonny Umaña RN sent at 3/2/2020 11:19 AM CST -----  Contact: Bonny Umaña RN [facility] again!    Please schedule the appt with Dr. Patel in 2 weeks and contact the pt.    Thank you,    Bonny

## 2020-03-02 NOTE — PLAN OF CARE
"   03/02/20 1623   Final Note   Assessment Type Final Discharge Note   Anticipated Discharge Disposition Home   What phone number can be called within the next 1-3 days to see how you are doing after discharge?   (Listed in chart)   Hospital Follow Up  Appt(s) scheduled? Yes   Discharge plans and expectations educations in teach back method with documentation complete? Yes   Right Care Referral Info   Post Acute Recommendation No Care   Post-Acute Status   Post-Acute Authorization Placement;HME   HME Status Set-up Complete   Discharge Delays   (Pt is getting the 2nd unit of blood.)     TN reviewed follow up appointment information as well as  "Post op discharge instructions" handout with patient using teach back while informing patient to concentrate on signs and symptoms to look for after discharge that would flag her that she needs to contact the doctor. Patient is in agreement and verbalized an understanding. Placed discharge information in blue discharge folder. TN also reviewed patient responsibility checklist with her using teach back. Patient was able to verbalize her responsibilities after discharge to manager her care at home being   1. Going to follow up appointments   2.  rx from the pharmacy when discharged  3. Taking her medication as prescribed     Patient's nurse, Tanna, informed that patient can discharge from  standpoint. Nurse can now complete discharge and review signs and symptoms teaching.   "

## 2020-03-02 NOTE — PHYSICIAN QUERY
"PT Name: Maryam Oneal  MR #: [id number]     Physician Query Form - Bowel Obstruction Clarification   Joan Beatty RN, CCDS; Desk # [phone]; [email or web address]    This form is a permanent document in the medical record.     Query Date: March 2, 2020    By submitting this query, we are merely seeking further clarification of documentation to reflect the severity of illness of your patient. Please utilize your independent clinical judgment when addressing the question(s) below.    The Medical record reflects the following:     Indicators   Supporting Clinical Findings Location in Medical Record   x Bowel obstruction, intestinal obstruction, LBO or SBO documented Obstructing left colon cancer no evidence of metastatic disease    large bowel obstruction secondary to colonic mass.      Op Note 2/23      DCS        Radiology findings      Treatment/Medication     x Procedure/Surgery Procedure:   COLECTOMY, LAPAROSCOPIC, possible open    COLECTOMY Op Note 2/23    Other       Provider, please further specify the bowel obstruction diagnosis:           Please further specify "Large Bowel Obstruction" .   [  X ] Partial or incomplete intestinal obstruction, due to neoplasm   [   ] Complete intestinal obstruction, due to neoplasm   [   ] Other intestinal condition (please specify): _____________________   [   ]  Clinically Undetermined     Please document in your progress notes daily for the duration of treatment until resolved, and include in your discharge summary.  "

## 2020-03-02 NOTE — DISCHARGE SUMMARY
[facility]  General Surgery  Discharge Summary      Patient Name: Maryam Oneal  MRN: [id number]  Admission Date: 2/20/2020  Hospital Length of Stay: 10 days  Discharge Date and Time:  03/02/2020 9:08 AM  Attending Physician: Dariel Mcginnis MD   Discharging Provider: Adwoa Guajardo MD  Primary Care Provider: Phuong Reynoso MD     HPI: 38 y/o F who presented with large bowel obstruction secondary to colonic mass. She underwent segmental colectomy with primary anastomosis. She slowly recovered, was restarted on eliquis for prior DVT by medicine service consult. She was treated with IV cipro/flagyl for intraabominal infection and UTI. She also received blood transfusion prior to discharge for anemia. She was discharged home tolerating a diet, having normal bowel movements and able to ambulate.      Procedure(s) (LRB):  COLECTOMY, LAPAROSCOPIC, attempted (N/A)  COLECTOMY (N/A)     Hospital Course: 38 y/o F who presented with large bowel obstruction secondary to colonic mass. She underwent segmental colectomy with primary anastomosis. She slowly recovered, was restarted on eliquis for prior DVT by medicine service consult. She was treated with IV cipro/flagyl for intraabominal infection and UTI. She also received blood transfusion prior to discharge for anemia. She was discharged home tolerating a diet, having normal bowel movements and able to ambulate.      Consults:   Consults (From admission, onward)        Status Ordering Provider     Inpatient consult to Gastroenterology  Once     Provider:  Ez Bartlett MD    Completed BETTY FOWLER     Inpatient consult to General Surgery  Once     Provider:  Sky Patel MD    Acknowledged SKY PATEL     Inpatient consult to Hospitalist  Once     Provider:  Charmaine Campbell MD    Acknowledged BETTY FOWLER     Inpatient consult to PICC team ([facility])  Once     Provider:  (Not yet assigned)    DARIEL Barbour          Significant Diagnostic  Studies: Labs:   BMP:   Recent Labs   Lab 03/01/20  0427 03/02/20  0425   * 116*   * 132*   K 3.0* 3.2*    101   CO2 23 23   BUN 12 9   CREATININE 1.0 0.9   CALCIUM 8.4* 8.3*    and CBC   Recent Labs   Lab 03/01/20  0427 03/02/20  0425   WBC 8.71 8.58   HGB 8.2* 7.7*   HCT 26.7* 24.1*    298       Pending Diagnostic Studies:     None        Final Active Diagnoses:    Diagnosis Date Noted POA    PRINCIPAL PROBLEM:  Large bowel obstruction [K56.609] 02/21/2020 Yes    Paroxysmal tachycardia [I47.9] 02/26/2020 Yes    Abdominal pain [R10.9] 02/23/2020 Yes    Major depressive disorder, recurrent episode, severe [F33.2] 05/15/2019 Yes    Anemia of chronic disease [D63.8] 03/23/2018 Yes     Chronic      Problems Resolved During this Admission:      Discharged Condition: fair    Disposition: Home or Self Care    Follow Up:  Follow-up Information     hPuong Reynoso MD. Schedule an appointment as soon as possible for a visit in 2 days.    Specialty:  Family Medicine  Why:  For re evaluation  Contact information:  [address] [facility] [address] [phone]             Go to [facility].    Specialty:  Emergency Medicine  Why:  As needed, if symptoms worsen, or any other concerns  Contact information:  [address] [facility] [address] [phone]           Sky Patel MD In 2 weeks.    Specialties:  General Surgery, Oncology  Contact information:  [address]  [address]  [facility] [address] [phone]                 Patient Instructions:      Diet Adult Regular     Lifting restrictions   Order Comments: No lifting >15 lbs for full 6 weeks     Notify your health care provider if you experience any of the following:  temperature >100.4     Notify your health care provider if you experience any of the following:  persistent nausea and vomiting or diarrhea     Notify your health care provider if you experience  any of the following:  severe uncontrolled pain     Notify your health care provider if you experience any of the following:  redness, tenderness, or signs of infection (pain, swelling, redness, odor or green/yellow discharge around incision site)     Notify your health care provider if you experience any of the following:  difficulty breathing or increased cough     Notify your health care provider if you experience any of the following:  persistent dizziness, light-headedness, or visual disturbances     No dressing needed     Activity as tolerated     Medications:  Reconciled Home Medications:      Medication List      START taking these medications    oxyCODONE-acetaminophen 5-325 mg per tablet  Commonly known as:  PERCOCET  Take 1 tablet by mouth every 4 (four) hours as needed for Pain.        CONTINUE taking these medications    acetaminophen 325 MG tablet  Commonly known as:  TYLENOL  Take 2 tablets (650 mg total) by mouth every 6 (six) hours as needed.     docusate sodium 100 MG capsule  Commonly known as:  COLACE  Take 1 capsule (100 mg total) by mouth 2 (two) times daily.     DULoxetine 60 MG capsule  Commonly known as:  CYMBALTA  Take 1 capsule (60 mg total) by mouth once daily.     Eliquis 2.5 mg Tab  Generic drug:  apixaban  Take 2.5 mg by mouth 2 (two) times daily.     losartan 100 MG tablet  Commonly known as:  COZAAR  Take 1 tablet (100 mg total) by mouth once daily.     losartan-hydrochlorothiazide 100-25 mg 100-25 mg per tablet  Commonly known as:  HYZAAR  Take 1 tablet by mouth once daily.     traZODone 50 MG tablet  Commonly known as:  DESYREL  Take 1 tablet (50 mg total) by mouth every evening.        ASK your doctor about these medications    sodium phosphates 19-7 gram/118 mL Enem  Commonly known as:  Enema  Place 1 enema rectally once. for 1 dose  Ask about: Should I take this medication?            Adwoa Guajardo MD  General Surgery  [facility]

## 2020-03-03 NOTE — PT/OT/SLP DISCHARGE
Occupational Therapy Discharge Summary    Maryam Oneal  MRN: [id number]   Principal Problem: Large bowel obstruction      Patient Discharged from acute Occupational Therapy on 3/2/20.  Please refer to prior OT notes for functional status.    Assessment:      Patient appropriate for care in another setting.    Objective:     GOALS:   Multidisciplinary Problems     Occupational Therapy Goals        Problem: Occupational Therapy Goal    Goal Priority Disciplines Outcome Interventions   Occupational Therapy Goal     OT, PT/OT Ongoing, Progressing    Description:  Goals to be met by: 3/10/20    Patient will increase functional independence with ADLs by performing:    UE Dressing with Modified [place].  LE Dressing with Stand-by Assistance and Assistive Devices as needed.  Grooming while standing at sink with Modified [place] and Supervision.  Toileting from bedside commode with Modified [place] and Supervision for hygiene and clothing management.   Rolling to Bilateral with Modified [place].   Supine to sit with Modified [place].  Step transfer with Modified [place]  Toilet transfer to bedside commode with Modified [place] and Supervision.  Upper extremity exercise program x15 reps per handout, with independence.  Educate the patient re: log roll technique                      Reasons for Discontinuation of Therapy Services  Transfer to alternate level of care.      Plan:     Patient Discharged to: home with family assistance    Sophie San OT  3/3/2020

## 2020-03-03 NOTE — NURSING
Pt was given her flu vac and pnuemo vac, her picc line and IVs discontinued. Discharge instructions given and pt discharge via wheel chair.

## 2020-03-03 NOTE — PHYSICIAN QUERY
"PT Name: Maryam Oneal  MR #: [id number]    Physician Query Form - Pathology Findings Clarification   Joan Beatty RN, CCDS; Desk # [phone]; [email or web address]    This form is a permanent document in the medical record.     Query Date: March 3, 2020    By submitting this query, we are merely seeking further clarification of documentation.  Please utilize your independent clinical judgment when addressing the question(s) below.    The medical record contains the following:     Findings Supporting Clinical Information Location in Medical Record   Specimen Collected: 02/23/20     Segment of colon (41.5 cm total length, submitted as colon):  -Invasive, moderately differentiated (G2), obstructing   adenocarcinoma  -Carcinoma is a single lesion measuring 5.2 x 3.2 x   3.1 cm and located 5.2 cm from the nearer,   proximal margin. Opposite margins, serosal   surfaces, and the radial margin are free of   malignancy.  -Carcinoma extends completely through muscularis   propria into serosal and mesenteric fat. Serosal and   mesenteric surfaces are free of malignancy.  -One (1) of twenty-one (21)) serosal and mesenteric   lymph nodes exhibits metastatic adenocarcinoma   with necrosis  -AJCC TN: pT3 pN1a     Interpreted by: Jamshid Batista M.D. 02/27/2020     Obstructing left colon cancer no evidence of metastatic disease    Procedure:  COLECTOMY, LAPAROSCOPIC, possible open    COLECTOMY     Pre/Post Op Dx:   Large Bowel Obstruction     Op Note 2/23     Please document the clinical significance of the Pathologists findings of :    "One (1) of twenty-one (21)) serosal and mesenteric lymph nodes exhibits metastatic adenocarcinoma   with necrosis"    [X   ] I agree with the Pathology Findings   [   ] I do not agree with the Pathology Findings   [   ] Other/Clarification of Findings:  _____________   [  ]   Clinically Undetermined     Please document in your progress notes daily for the duration of treatment until resolved and " include in your discharge summary.

## 2020-03-04 ENCOUNTER — PATIENT OUTREACH (OUTPATIENT)
Dept: ADMINISTRATIVE | Facility: CLINIC | Age: 40
End: 2020-03-04

## 2020-03-04 DIAGNOSIS — I10 HYPERTENSION, UNSPECIFIED TYPE: Chronic | ICD-10-CM

## 2020-03-04 DIAGNOSIS — F33.2 SEVERE EPISODE OF RECURRENT MAJOR DEPRESSIVE DISORDER, WITHOUT PSYCHOTIC FEATURES: ICD-10-CM

## 2020-03-04 DIAGNOSIS — D63.8 ANEMIA OF CHRONIC DISEASE: Chronic | ICD-10-CM

## 2020-03-04 DIAGNOSIS — R60.0 PERIPHERAL EDEMA: ICD-10-CM

## 2020-03-04 DIAGNOSIS — E66.01 MORBID OBESITY WITH BMI OF 50.0-59.9, ADULT: Primary | Chronic | ICD-10-CM

## 2020-03-04 DIAGNOSIS — I47.9 PAROXYSMAL TACHYCARDIA: ICD-10-CM

## 2020-03-04 NOTE — PATIENT INSTRUCTIONS
After Bowel Surgery: Recovering in the Hospital and at Home    You may be in the hospital overnight or longer. Once you are out of the hospital, recovery may take up to several months. It depends on the type of surgery you had.  Right after surgery  After surgery, youll be taken to the recovery room. Here your blood pressure, pulse, and breathing will be checked. Youll also be given pain medicine as needed. When youre ready, youll be moved to a regular hospital room.  Your recovery in the hospital  · Soon after surgery youll be urged to get up and take short walks. This helps you heal faster. Gentle movement can help your digestive function. Walking also helps your heart and lungs, and can keep clots from forming in your legs.  · You may be able to have some liquids in the first day or two. If it seems unlikely that your bowel will recover quickly, you may get nutrition through an IV (intravenous) tube. You may first be given a tube that passes through your nose to your stomach (a nasogastric tube). This keeps your stomach empty. This can help your digestive tract heal.  · If you have a colostomy or ileostomy, you may also meet with an ostomy nurse. He or she will teach you how to care for yourself as you heal.  · You will be shown how to do breathing exercises using a special device (incentive spirometer). This can prevent complications such as pneumonia.  · You may have a tube (catheter) in your bladder to drain urine. This may be in place for the first day after surgery. In some cases it may be in place longer.  Getting back to normal at home  Depending on your surgery, even mild activity can make you tired in the first few weeks or months. After a few months, you may be feeling back to normal.  · Stay active. But avoid hard exercise and heavy lifting in the first few months.  · You can walk, climb stairs, shower, and bathe soon after surgery. But dont drive until your provider says you can.  · Follow all  special diet instructions you are given.  · Take care of your cut (incision) and any drains, as directed by your provider.  When to call your provider  Call your healthcare provider right away if you have any of the following:  · Fever of 100.4°F (38°C)  · Upset stomach (nausea) or vomiting  · Increased belly pain  · Constipation, diarrhea, or bloating  · Increased redness, swelling, drainage, or pain near the incision  · Trouble controlling bowel movements   · Bloody stool or black, tarry stool  · Vomiting blood  · Not able to urinate     © 7536-8176 [employer]. [address] [place] [address], [place] [address]. All rights reserved. This information is not intended as a substitute for professional medical care. Always follow your healthcare professional's instructions.

## 2020-03-13 ENCOUNTER — TELEPHONE (OUTPATIENT)
Dept: SURGERY | Facility: CLINIC | Age: 40
End: 2020-03-13

## 2020-03-13 NOTE — TELEPHONE ENCOUNTER
Pt notified of appt with  3/16/20 @ 2pm          ----- Message from Mary Hyde sent at 3/13/2020 10:22 AM CDT -----  Contact: KIET CHRISTENSEN [[id number]]  Name of Who is Calling: KIET CHRISTENSEN [[id number]]     What is the request in detail:KIET CHRISTENSEN [[id number]] is requesting a call back in regards to post op appointment after procedure ....  Please contact to further discuss and advise      Can the clinic reply by [facility]: no    What Number to Call Back if not in [facility]: [phone] (home)

## 2020-03-16 ENCOUNTER — OFFICE VISIT (OUTPATIENT)
Dept: SURGERY | Facility: CLINIC | Age: 40
End: 2020-03-16
Payer: MEDICAID

## 2020-03-16 ENCOUNTER — DOCUMENTATION ONLY (OUTPATIENT)
Dept: SURGERY | Facility: CLINIC | Age: 40
End: 2020-03-16

## 2020-03-16 VITALS
TEMPERATURE: 99 F | BODY MASS INDEX: 39.68 KG/M2 | HEIGHT: 72 IN | HEART RATE: 117 BPM | WEIGHT: 293 LBS | DIASTOLIC BLOOD PRESSURE: 89 MMHG | SYSTOLIC BLOOD PRESSURE: 139 MMHG

## 2020-03-16 DIAGNOSIS — C18.5 MALIGNANT NEOPLASM OF SPLENIC FLEXURE: Primary | ICD-10-CM

## 2020-03-16 PROCEDURE — 99024 PR POST-OP FOLLOW-UP VISIT: ICD-10-PCS | Mod: S$GLB,,, | Performed by: SURGERY

## 2020-03-16 PROCEDURE — 99024 POSTOP FOLLOW-UP VISIT: CPT | Mod: S$GLB,,, | Performed by: SURGERY

## 2020-03-16 RX ORDER — CLOTRIMAZOLE AND BETAMETHASONE DIPROPIONATE 10; .64 MG/G; MG/G
CREAM TOPICAL 2 TIMES DAILY
Qty: 1 TUBE | Refills: 5 | Status: SHIPPED | OUTPATIENT
Start: 2020-03-16 | End: 2020-11-19

## 2020-03-16 RX ORDER — NYSTATIN 100000 [USP'U]/ML
4 SUSPENSION ORAL 4 TIMES DAILY
Qty: 160 ML | Refills: 1 | Status: SHIPPED | OUTPATIENT
Start: 2020-03-16 | End: 2020-03-26

## 2020-03-16 NOTE — PROGRESS NOTES
Subjective:       Patient ID: Maryam Oneal is a 39 y.o. female.    Chief Complaint: Post-op Evaluation    HPI 40 yo female with recent colon cancer resection for T3 N1 left colon cancer with some nausea and SOB  Review of Systems   Constitutional: Negative.    HENT: Negative.    Eyes: Negative.    Respiratory: Negative.    Cardiovascular: Negative.    Gastrointestinal: Positive for diarrhea and nausea.   Endocrine: Negative.    Musculoskeletal: Negative.    Skin: Negative.    Allergic/Immunologic: Negative.    Neurological: Negative.    Hematological: Negative.    Psychiatric/Behavioral: Negative.    All other systems reviewed and are negative.      Objective:      Physical Exam   Constitutional: She is oriented to person, place, and time. She appears well-developed and well-nourished.   HENT:   Head: Normocephalic and atraumatic.   Right Ear: External ear normal.   Left Ear: External ear normal.   Nose: Nose normal.   Mouth/Throat: Oropharynx is clear and moist.   Eyes: Pupils are equal, round, and reactive to light. Conjunctivae and EOM are normal.   Neck: Normal range of motion. Neck supple.   Cardiovascular: Normal rate, regular rhythm, normal heart sounds and intact distal pulses.   Pulmonary/Chest: Effort normal and breath sounds normal.   Abdominal: Soft. Bowel sounds are normal.       Musculoskeletal: Normal range of motion.   Neurological: She is alert and oriented to person, place, and time. She has normal reflexes.   Skin: Skin is warm and dry.   Psychiatric: She has a normal mood and affect. Her behavior is normal. Thought content normal.   Vitals reviewed.      Assessment:       1. Malignant neoplasm of splenic flexure      CHRIS but with edema and nausea  Plan:       I will give her lasix and send her to see [facility] in [facility].  I will also restart her nystatin and get labs as well as Roxanne

## 2020-03-16 NOTE — H&P (VIEW-ONLY)
Subjective:       Patient ID: Maryam Oneal is a 39 y.o. female.    Chief Complaint: Post-op Evaluation    HPI 40 yo female with recent colon cancer resection for T3 N1 left colon cancer with some nausea and SOB  Review of Systems   Constitutional: Negative.    HENT: Negative.    Eyes: Negative.    Respiratory: Negative.    Cardiovascular: Negative.    Gastrointestinal: Positive for diarrhea and nausea.   Endocrine: Negative.    Musculoskeletal: Negative.    Skin: Negative.    Allergic/Immunologic: Negative.    Neurological: Negative.    Hematological: Negative.    Psychiatric/Behavioral: Negative.    All other systems reviewed and are negative.      Objective:      Physical Exam   Constitutional: She is oriented to person, place, and time. She appears well-developed and well-nourished.   HENT:   Head: Normocephalic and atraumatic.   Right Ear: External ear normal.   Left Ear: External ear normal.   Nose: Nose normal.   Mouth/Throat: Oropharynx is clear and moist.   Eyes: Pupils are equal, round, and reactive to light. Conjunctivae and EOM are normal.   Neck: Normal range of motion. Neck supple.   Cardiovascular: Normal rate, regular rhythm, normal heart sounds and intact distal pulses.   Pulmonary/Chest: Effort normal and breath sounds normal.   Abdominal: Soft. Bowel sounds are normal.       Musculoskeletal: Normal range of motion.   Neurological: She is alert and oriented to person, place, and time. She has normal reflexes.   Skin: Skin is warm and dry.   Psychiatric: She has a normal mood and affect. Her behavior is normal. Thought content normal.   Vitals reviewed.      Assessment:       1. Malignant neoplasm of splenic flexure      CHRIS but with edema and nausea  Plan:       I will give her lasix and send her to see [facility] in [facility].  I will also restart her nystatin and get labs as well as Roxanne

## 2020-03-18 ENCOUNTER — TELEPHONE (OUTPATIENT)
Dept: SURGERY | Facility: CLINIC | Age: 40
End: 2020-03-18

## 2020-03-18 NOTE — TELEPHONE ENCOUNTER
Pts mother called she is having a lot of abdominal pain and keeps having diarrhea- notified-pts mother advised to bring her to the ER and have them call  when they arrive.        ----- Message from Maria L Colin sent at 3/18/2020  3:24 PM CDT -----  Contact: Self: [phone]  Type: Patient Call Back    Who called:Mother Negra     What is the request in detail:Called regarding patient being in a tremendous amount of pain, please call to advise.    Can the clinic reply by [facility]? No    Would the patient rather a call back or a response via [facility]?     Best call back number:[phone]

## 2020-03-19 ENCOUNTER — DOCUMENTATION ONLY (OUTPATIENT)
Dept: SURGERY | Facility: CLINIC | Age: 40
End: 2020-03-19

## 2020-03-27 ENCOUNTER — TELEPHONE (OUTPATIENT)
Dept: SURGERY | Facility: CLINIC | Age: 40
End: 2020-03-27

## 2020-03-27 DIAGNOSIS — C18.9 COLON CANCER: ICD-10-CM

## 2020-03-27 DIAGNOSIS — C18.5 MALIGNANT NEOPLASM OF SPLENIC FLEXURE: Primary | ICD-10-CM

## 2020-03-27 RX ORDER — MUPIROCIN 20 MG/G
OINTMENT TOPICAL
Status: CANCELLED | OUTPATIENT
Start: 2020-03-27

## 2020-03-27 RX ORDER — SODIUM CHLORIDE 9 MG/ML
INJECTION, SOLUTION INTRAVENOUS CONTINUOUS
Status: CANCELLED | OUTPATIENT
Start: 2020-03-27

## 2020-03-27 RX ORDER — LIDOCAINE HYDROCHLORIDE 10 MG/ML
1 INJECTION, SOLUTION EPIDURAL; INFILTRATION; INTRACAUDAL; PERINEURAL ONCE
Status: CANCELLED | OUTPATIENT
Start: 2020-03-27 | End: 2020-03-27

## 2020-03-27 NOTE — TELEPHONE ENCOUNTER
Pt notified surgery for port insertion is scheduled for 4/1/20 and to gerson/c Eliquis 2 days before surgery per Dr. Patel.

## 2020-03-31 ENCOUNTER — TELEPHONE (OUTPATIENT)
Dept: SURGERY | Facility: CLINIC | Age: 40
End: 2020-03-31

## 2020-03-31 ENCOUNTER — HOSPITAL ENCOUNTER (OUTPATIENT)
Dept: PREADMISSION TESTING | Facility: HOSPITAL | Age: 40
Discharge: HOME | DRG: 175 | End: 2020-03-31
Attending: SURGERY
Payer: MEDICAID

## 2020-03-31 ENCOUNTER — ANESTHESIA EVENT (OUTPATIENT)
Dept: SURGERY | Facility: HOSPITAL | Age: 40
DRG: 175 | End: 2020-03-31
Payer: MEDICAID

## 2020-03-31 VITALS
BODY MASS INDEX: 39.68 KG/M2 | SYSTOLIC BLOOD PRESSURE: 164 MMHG | WEIGHT: 293 LBS | RESPIRATION RATE: 20 BRPM | HEIGHT: 72 IN | TEMPERATURE: 98 F | HEART RATE: 82 BPM | DIASTOLIC BLOOD PRESSURE: 100 MMHG | OXYGEN SATURATION: 99 %

## 2020-03-31 DIAGNOSIS — Z01.818 PREOPERATIVE TESTING: Primary | ICD-10-CM

## 2020-03-31 LAB
ALBUMIN SERPL BCP-MCNC: 2.3 G/DL (ref 3.5–5.2)
ALP SERPL-CCNC: 72 U/L (ref 55–135)
ALT SERPL W/O P-5'-P-CCNC: 13 U/L (ref 10–44)
ANION GAP SERPL CALC-SCNC: 9 MMOL/L (ref 8–16)
AST SERPL-CCNC: 13 U/L (ref 10–40)
BASOPHILS # BLD AUTO: 0.02 K/UL (ref 0–0.2)
BASOPHILS NFR BLD: 0.4 % (ref 0–1.9)
BILIRUB SERPL-MCNC: 0.6 MG/DL (ref 0.1–1)
BUN SERPL-MCNC: 2 MG/DL (ref 6–20)
CALCIUM SERPL-MCNC: 8 MG/DL (ref 8.7–10.5)
CHLORIDE SERPL-SCNC: 101 MMOL/L (ref 95–110)
CO2 SERPL-SCNC: 30 MMOL/L (ref 23–29)
CREAT SERPL-MCNC: 0.7 MG/DL (ref 0.5–1.4)
DIFFERENTIAL METHOD: ABNORMAL
EOSINOPHIL # BLD AUTO: 0.1 K/UL (ref 0–0.5)
EOSINOPHIL NFR BLD: 1.8 % (ref 0–8)
ERYTHROCYTE [DISTWIDTH] IN BLOOD BY AUTOMATED COUNT: 20 % (ref 11.5–14.5)
EST. GFR  (AFRICAN AMERICAN): >60 ML/MIN/1.73 M^2
EST. GFR  (NON AFRICAN AMERICAN): >60 ML/MIN/1.73 M^2
GLUCOSE SERPL-MCNC: 109 MG/DL (ref 70–110)
HCT VFR BLD AUTO: 31.3 % (ref 37–48.5)
HGB BLD-MCNC: 9.7 G/DL (ref 12–16)
IMM GRANULOCYTES # BLD AUTO: 0.02 K/UL (ref 0–0.04)
IMM GRANULOCYTES NFR BLD AUTO: 0.4 % (ref 0–0.5)
LYMPHOCYTES # BLD AUTO: 1.7 K/UL (ref 1–4.8)
LYMPHOCYTES NFR BLD: 30.5 % (ref 18–48)
MCH RBC QN AUTO: 26.5 PG (ref 27–31)
MCHC RBC AUTO-ENTMCNC: 31 G/DL (ref 32–36)
MCV RBC AUTO: 86 FL (ref 82–98)
MONOCYTES # BLD AUTO: 0.5 K/UL (ref 0.3–1)
MONOCYTES NFR BLD: 9.3 % (ref 4–15)
NEUTROPHILS # BLD AUTO: 3.2 K/UL (ref 1.8–7.7)
NEUTROPHILS NFR BLD: 57.6 % (ref 38–73)
NRBC BLD-RTO: 0 /100 WBC
PLATELET # BLD AUTO: 311 K/UL (ref 150–350)
PMV BLD AUTO: 8.8 FL (ref 9.2–12.9)
POTASSIUM SERPL-SCNC: 2.7 MMOL/L (ref 3.5–5.1)
PROT SERPL-MCNC: 6.9 G/DL (ref 6–8.4)
RBC # BLD AUTO: 3.66 M/UL (ref 4–5.4)
SODIUM SERPL-SCNC: 140 MMOL/L (ref 136–145)
WBC # BLD AUTO: 5.51 K/UL (ref 3.9–12.7)

## 2020-03-31 PROCEDURE — 80053 COMPREHEN METABOLIC PANEL: CPT

## 2020-03-31 PROCEDURE — 85025 COMPLETE CBC W/AUTO DIFF WBC: CPT

## 2020-03-31 PROCEDURE — 36415 COLL VENOUS BLD VENIPUNCTURE: CPT

## 2020-03-31 NOTE — TELEPHONE ENCOUNTER
----- Message from Silverio Contreras sent at 3/31/2020  4:16 PM CDT -----  Contact: KIET CHRISTENSEN [[id number]]  Name of Who is Calling: KIET CHRISTENSEN [[id number]]      What is the request in detail: Would like to speak with staff in regards to taking a DNL test and feels she needs a Cat Scan before her port tomorrow. States heart doctor called and said she might have blood clot in her lungs. Please advise      Can the clinic reply by [facility]: no      What [facility] Back [facility]: [phone] or [phone]

## 2020-03-31 NOTE — DISCHARGE INSTRUCTIONS
Your procedure  is scheduled for _WED 4/1_________.    Call [phone] between 2pm and 5pm on _Tuesday 3/31______to find out your arrival time for the day of surgery.    Report to  ER at ____ AM on the 1ST FLOOR    Important instructions:   Do not eat or drink after 12 midnight, including water.  It is okay to brush your teeth.  Do not have gum, candy or mints.     Take only these medications with a small swallow of water on the morning of your surgery _PRESSURE PILL_____________        Stop taking Aspirin, Ibuprofen, Motrin and Aleve , Fish oil, and Vitamin E for at least 7 days before your surgery. You may use Tylenol unless otherwise instructed by your doctor.                                    Prep instructions:     SHOWER   OTHER_____________     Please shower the night before and the morning of your surgery.        Use Hibiclens soap as instructed by your pre op nurse.   Please place clean linens on your bed the night before surgery. Please wear fresh clean clothing after each shower.             Female patients may be asked for a urine specimen on the morning of the surgery.  Please check with your nurse before using the restroom.     Do not wear make- up, including mascara.     You may wear deodorant only.      Do not wear powder, body lotion or perfume/cologne.     Do not wear any jewelry or have any metal on your body.               If you are going home on the same day of surgery, you must arrange for a family member or a friend to drive you home.  Public transportation is prohibited.  You will not be able to drive home if you were given anesthesia or sedation.     .     Wear loose fitting clothes allowing for bandages.     Please leave money and valuables home.       You may bring your cell phone.     Call the doctor if fever or illness should occur before your surgery.    Call [phone] to contact us here if needed.

## 2020-04-01 ENCOUNTER — HOSPITAL ENCOUNTER (INPATIENT)
Facility: HOSPITAL | Age: 40
LOS: 1 days | Discharge: HOME HEALTH | DRG: 175 | End: 2020-04-02
Attending: EMERGENCY MEDICINE | Admitting: SURGERY
Payer: MEDICAID

## 2020-04-01 ENCOUNTER — ANESTHESIA (OUTPATIENT)
Dept: SURGERY | Facility: HOSPITAL | Age: 40
DRG: 175 | End: 2020-04-01
Payer: MEDICAID

## 2020-04-01 DIAGNOSIS — R10.84 GENERALIZED ABDOMINAL PAIN: ICD-10-CM

## 2020-04-01 DIAGNOSIS — C18.4 MALIGNANT NEOPLASM OF TRANSVERSE COLON: ICD-10-CM

## 2020-04-01 DIAGNOSIS — R18.8 INTRAABDOMINAL FLUID COLLECTION: ICD-10-CM

## 2020-04-01 DIAGNOSIS — Z85.038 HISTORY OF COLON CANCER: ICD-10-CM

## 2020-04-01 DIAGNOSIS — I26.99 ACUTE PULMONARY EMBOLISM, UNSPECIFIED PULMONARY EMBOLISM TYPE, UNSPECIFIED WHETHER ACUTE COR PULMONALE PRESENT: Primary | ICD-10-CM

## 2020-04-01 DIAGNOSIS — C18.9 MALIGNANT NEOPLASM OF COLON, UNSPECIFIED PART OF COLON: ICD-10-CM

## 2020-04-01 PROBLEM — E87.6 HYPOKALEMIA: Status: ACTIVE | Noted: 2020-04-01

## 2020-04-01 PROBLEM — I10 ESSENTIAL HYPERTENSION: Status: ACTIVE | Noted: 2020-04-01

## 2020-04-01 LAB
ANION GAP SERPL CALC-SCNC: 12 MMOL/L (ref 8–16)
APTT BLDCRRT: 26.5 SEC (ref 21–32)
B-HCG UR QL: NEGATIVE
BASOPHILS # BLD AUTO: 0.02 K/UL (ref 0–0.2)
BASOPHILS NFR BLD: 0.4 % (ref 0–1.9)
BUN SERPL-MCNC: <3 MG/DL (ref 6–30)
CHLORIDE SERPL-SCNC: 97 MMOL/L (ref 95–110)
CREAT SERPL-MCNC: 0.5 MG/DL (ref 0.5–1.4)
CTP QC/QA: YES
DIFFERENTIAL METHOD: ABNORMAL
EOSINOPHIL # BLD AUTO: 0.1 K/UL (ref 0–0.5)
EOSINOPHIL NFR BLD: 2.1 % (ref 0–8)
ERYTHROCYTE [DISTWIDTH] IN BLOOD BY AUTOMATED COUNT: 20.7 % (ref 11.5–14.5)
GLUCOSE SERPL-MCNC: 98 MG/DL (ref 70–110)
HCT VFR BLD AUTO: 31.5 % (ref 37–48.5)
HCT VFR BLD CALC: 32 %PCV (ref 36–54)
HGB BLD-MCNC: 9.8 G/DL (ref 12–16)
IMM GRANULOCYTES # BLD AUTO: 0.02 K/UL (ref 0–0.04)
IMM GRANULOCYTES NFR BLD AUTO: 0.4 % (ref 0–0.5)
INR PPP: 1 (ref 0.8–1.2)
LYMPHOCYTES # BLD AUTO: 2 K/UL (ref 1–4.8)
LYMPHOCYTES NFR BLD: 38 % (ref 18–48)
MCH RBC QN AUTO: 27.5 PG (ref 27–31)
MCHC RBC AUTO-ENTMCNC: 31.1 G/DL (ref 32–36)
MCV RBC AUTO: 88 FL (ref 82–98)
MONOCYTES # BLD AUTO: 0.5 K/UL (ref 0.3–1)
MONOCYTES NFR BLD: 9.2 % (ref 4–15)
NEUTROPHILS # BLD AUTO: 2.6 K/UL (ref 1.8–7.7)
NEUTROPHILS NFR BLD: 49.9 % (ref 38–73)
NRBC BLD-RTO: 0 /100 WBC
PLATELET # BLD AUTO: 294 K/UL (ref 150–350)
PMV BLD AUTO: 10.5 FL (ref 9.2–12.9)
POC IONIZED CALCIUM: 1.11 MMOL/L (ref 1.06–1.42)
POC TCO2 (MEASURED): 36 MMOL/L (ref 23–29)
POTASSIUM BLD-SCNC: 3.1 MMOL/L (ref 3.5–5.1)
POTASSIUM SERPL-SCNC: 2.8 MMOL/L (ref 3.5–5.1)
PROTHROMBIN TIME: 11.1 SEC (ref 9–12.5)
RBC # BLD AUTO: 3.57 M/UL (ref 4–5.4)
SAMPLE: ABNORMAL
SODIUM BLD-SCNC: 140 MMOL/L (ref 136–145)
WBC # BLD AUTO: 5.19 K/UL (ref 3.9–12.7)

## 2020-04-01 PROCEDURE — 63600175 PHARM REV CODE 636 W HCPCS

## 2020-04-01 PROCEDURE — 25500020 PHARM REV CODE 255: Performed by: EMERGENCY MEDICINE

## 2020-04-01 PROCEDURE — 85025 COMPLETE CBC W/AUTO DIFF WBC: CPT

## 2020-04-01 PROCEDURE — 85730 THROMBOPLASTIN TIME PARTIAL: CPT

## 2020-04-01 PROCEDURE — 25000003 PHARM REV CODE 250: Performed by: SURGERY

## 2020-04-01 PROCEDURE — 11000001 HC ACUTE MED/SURG PRIVATE ROOM

## 2020-04-01 PROCEDURE — 25000003 PHARM REV CODE 250: Performed by: PHYSICIAN ASSISTANT

## 2020-04-01 PROCEDURE — 25000003 PHARM REV CODE 250: Performed by: HOSPITALIST

## 2020-04-01 PROCEDURE — 84132 ASSAY OF SERUM POTASSIUM: CPT

## 2020-04-01 PROCEDURE — 36415 COLL VENOUS BLD VENIPUNCTURE: CPT

## 2020-04-01 PROCEDURE — 25000003 PHARM REV CODE 250

## 2020-04-01 PROCEDURE — 99285 EMERGENCY DEPT VISIT HI MDM: CPT | Mod: 25

## 2020-04-01 PROCEDURE — 85610 PROTHROMBIN TIME: CPT

## 2020-04-01 RX ORDER — ONDANSETRON 8 MG/1
8 TABLET, ORALLY DISINTEGRATING ORAL EVERY 8 HOURS PRN
Status: DISCONTINUED | OUTPATIENT
Start: 2020-04-01 | End: 2020-04-02 | Stop reason: HOSPADM

## 2020-04-01 RX ORDER — HYDROCODONE BITARTRATE AND ACETAMINOPHEN 5; 325 MG/1; MG/1
1 TABLET ORAL EVERY 4 HOURS PRN
Status: DISCONTINUED | OUTPATIENT
Start: 2020-04-01 | End: 2020-04-02 | Stop reason: HOSPADM

## 2020-04-01 RX ORDER — LIDOCAINE HYDROCHLORIDE 10 MG/ML
1 INJECTION, SOLUTION EPIDURAL; INFILTRATION; INTRACAUDAL; PERINEURAL ONCE
Status: DISCONTINUED | OUTPATIENT
Start: 2020-04-01 | End: 2020-04-02 | Stop reason: HOSPADM

## 2020-04-01 RX ORDER — ACETAMINOPHEN 325 MG/1
650 TABLET ORAL EVERY 4 HOURS PRN
Status: DISCONTINUED | OUTPATIENT
Start: 2020-04-01 | End: 2020-04-02 | Stop reason: HOSPADM

## 2020-04-01 RX ORDER — LOSARTAN POTASSIUM AND HYDROCHLOROTHIAZIDE 12.5; 5 MG/1; MG/1
2 TABLET ORAL DAILY
Status: DISCONTINUED | OUTPATIENT
Start: 2020-04-01 | End: 2020-04-02 | Stop reason: HOSPADM

## 2020-04-01 RX ORDER — AMLODIPINE BESYLATE 5 MG/1
10 TABLET ORAL DAILY
Status: DISCONTINUED | OUTPATIENT
Start: 2020-04-01 | End: 2020-04-02 | Stop reason: HOSPADM

## 2020-04-01 RX ORDER — ACETAMINOPHEN 325 MG/1
650 TABLET ORAL EVERY 8 HOURS PRN
Status: DISCONTINUED | OUTPATIENT
Start: 2020-04-01 | End: 2020-04-02 | Stop reason: HOSPADM

## 2020-04-01 RX ORDER — SODIUM CHLORIDE 0.9 % (FLUSH) 0.9 %
10 SYRINGE (ML) INJECTION
Status: DISCONTINUED | OUTPATIENT
Start: 2020-04-01 | End: 2020-04-02 | Stop reason: HOSPADM

## 2020-04-01 RX ORDER — DEXTROSE MONOHYDRATE, SODIUM CHLORIDE, AND POTASSIUM CHLORIDE 50; 1.49; 4.5 G/1000ML; G/1000ML; G/1000ML
INJECTION, SOLUTION INTRAVENOUS CONTINUOUS
Status: DISCONTINUED | OUTPATIENT
Start: 2020-04-01 | End: 2020-04-02 | Stop reason: HOSPADM

## 2020-04-01 RX ORDER — ENOXAPARIN SODIUM 100 MG/ML
1 INJECTION SUBCUTANEOUS ONCE
Status: DISCONTINUED | OUTPATIENT
Start: 2020-04-01 | End: 2020-04-01 | Stop reason: SDUPTHER

## 2020-04-01 RX ORDER — MORPHINE SULFATE 10 MG/ML
4 INJECTION INTRAMUSCULAR; INTRAVENOUS; SUBCUTANEOUS EVERY 4 HOURS PRN
Status: DISCONTINUED | OUTPATIENT
Start: 2020-04-01 | End: 2020-04-02 | Stop reason: HOSPADM

## 2020-04-01 RX ORDER — SIMETHICONE 80 MG
1 TABLET,CHEWABLE ORAL 3 TIMES DAILY PRN
Status: DISCONTINUED | OUTPATIENT
Start: 2020-04-01 | End: 2020-04-02 | Stop reason: HOSPADM

## 2020-04-01 RX ORDER — DEXTROSE MONOHYDRATE AND SODIUM CHLORIDE 5; .9 G/100ML; G/100ML
INJECTION, SOLUTION INTRAVENOUS CONTINUOUS
Status: DISCONTINUED | OUTPATIENT
Start: 2020-04-01 | End: 2020-04-01

## 2020-04-01 RX ORDER — POTASSIUM CHLORIDE 20 MEQ/1
40 TABLET, EXTENDED RELEASE ORAL
Status: COMPLETED | OUTPATIENT
Start: 2020-04-01 | End: 2020-04-01

## 2020-04-01 RX ORDER — TALC
6 POWDER (GRAM) TOPICAL NIGHTLY PRN
Status: DISCONTINUED | OUTPATIENT
Start: 2020-04-01 | End: 2020-04-02 | Stop reason: HOSPADM

## 2020-04-01 RX ORDER — ENOXAPARIN SODIUM 100 MG/ML
1 INJECTION SUBCUTANEOUS EVERY 12 HOURS
Status: DISCONTINUED | OUTPATIENT
Start: 2020-04-01 | End: 2020-04-02

## 2020-04-01 RX ORDER — DEXTROSE MONOHYDRATE, SODIUM CHLORIDE, AND POTASSIUM CHLORIDE 50; 1.49; 4.5 G/1000ML; G/1000ML; G/1000ML
INJECTION, SOLUTION INTRAVENOUS CONTINUOUS
Status: DISCONTINUED | OUTPATIENT
Start: 2020-04-01 | End: 2020-04-01

## 2020-04-01 RX ORDER — CLONIDINE HYDROCHLORIDE 0.1 MG/1
0.2 TABLET ORAL EVERY 4 HOURS PRN
Status: DISCONTINUED | OUTPATIENT
Start: 2020-04-01 | End: 2020-04-02 | Stop reason: HOSPADM

## 2020-04-01 RX ADMIN — ENOXAPARIN SODIUM 170 MG: 100 INJECTION SUBCUTANEOUS at 09:04

## 2020-04-01 RX ADMIN — Medication 6 MG: at 08:04

## 2020-04-01 RX ADMIN — HYDROCODONE BITARTRATE AND ACETAMINOPHEN 1 TABLET: 5; 325 TABLET ORAL at 09:04

## 2020-04-01 RX ADMIN — CLONIDINE HYDROCHLORIDE 0.2 MG: 0.1 TABLET ORAL at 04:04

## 2020-04-01 RX ADMIN — POTASSIUM CHLORIDE, DEXTROSE MONOHYDRATE AND SODIUM CHLORIDE: 150; 5; 450 INJECTION, SOLUTION INTRAVENOUS at 11:04

## 2020-04-01 RX ADMIN — AMLODIPINE BESYLATE 10 MG: 5 TABLET ORAL at 11:04

## 2020-04-01 RX ADMIN — LOSARTAN POTASSIUM AND HYDROCHLOROTHIAZIDE 2 TABLET: 12.5; 5 TABLET ORAL at 11:04

## 2020-04-01 RX ADMIN — POTASSIUM CHLORIDE, DEXTROSE MONOHYDRATE AND SODIUM CHLORIDE: 150; 5; 450 INJECTION, SOLUTION INTRAVENOUS at 08:04

## 2020-04-01 RX ADMIN — SIMETHICONE CHEW TAB 80 MG 80 MG: 80 TABLET ORAL at 07:04

## 2020-04-01 RX ADMIN — HYDROCODONE BITARTRATE AND ACETAMINOPHEN 1 TABLET: 5; 325 TABLET ORAL at 03:04

## 2020-04-01 RX ADMIN — IOHEXOL 75 ML: 350 INJECTION, SOLUTION INTRAVENOUS at 06:04

## 2020-04-01 RX ADMIN — POTASSIUM CHLORIDE 40 MEQ: 1500 TABLET, EXTENDED RELEASE ORAL at 08:04

## 2020-04-01 RX ADMIN — ACETAMINOPHEN 650 MG: 325 TABLET ORAL at 08:04

## 2020-04-01 NOTE — PLAN OF CARE
Patient from home with mother.  Patient was supposed to have home health at her last discharge but it was not arranged, per mother.  Patient's mother also requesting a BSC (bariatric) at discharge.    Preferred pharmacy is [facility] on [address] in [place].  Preferred primary care provider is [facility].       04/01/20 0511   Discharge Assessment   Assessment Type Discharge Planning Assessment   Confirmed/corrected address and phone number on facesheet? Yes   Assessment information obtained from? Caregiver  (mother)   Prior to hospitilization cognitive status: Alert/Oriented   Prior to hospitalization functional status: Assistive Equipment;Needs Assistance   Current cognitive status: Alert/Oriented   Current Functional Status: Needs Assistance   Facility Arrived From: home   Lives With parent(s)   Able to Return to Prior Arrangements yes   Is patient able to care for self after discharge? Yes   Who are your caregiver(s) and their phone number(s)? Negra martines; [phone]   Patient's perception of discharge disposition home health   Readmission Within the Last 30 Days current reason for admission unrelated to previous admission   Patient currently being followed by outpatient case management? No   Patient currently receives any other outside agency services? No   Equipment Currently Used at Home walker, rolling   Part D Coverage n/a   Do you have any problems affording any of your prescribed medications? No   Is the patient taking medications as prescribed? yes   Does the patient have transportation home? Yes   Transportation Anticipated family or friend will provide   Dialysis Name and Scheduled days n/a   Does the patient receive services at the [facility]mad[facility] Clinic?   (Had been prescribed Eliquis.)   Discharge Plan A Home Health   Discharge Plan B Home with family   DME Needed Upon Discharge  bedside commode   Patient/Family in Agreement with Plan yes  (Assessment completed with assistance from patient's  mother.)   Re-admit.

## 2020-04-01 NOTE — ED NOTES
Attempted to call report to nurse for room 250D. Staff in same day surgery state they do not know what nurse will have that pt and the [facility] charge nurse is not answering the phone. States they will call me back after getting clarification. Admit delay at this time

## 2020-04-01 NOTE — H&P
General Surgery H&P    HPI:  40yo woman who initially presented with a large bowel obstruction in February, and underwent segmental colectomy and anastomosis, for colon malignancy. Was to have Port-a-cath placed today as elective procedure. However, prior to this her outpatient cardiologist felt she was high risk for PE due to elevated D-Dimer and sent her to the ED for CT scan prior to the procedure. CT chest shows moderate right sided PE. She has a hx of DVT in the past and was on Eliquis, however had stopped this long before her prior admission. She was restarted on Eliquis on her last admission but did not continue it as outpatient as instructed. She states she does not feel SOB or have any chest pain.     She does complain of abdominal pain which has been ongoing mildly since her surgery. Located at upper abdomen. No vomiting, does have occasional nausea. BMs have been normal and she tolerates PO. On her CTA, the upper abdomen shows a possible air/fluid collection.     ROS:  Full 10-point ROS was conducted and is negative except per HPI above    Past Medical History:   Diagnosis Date    Arthritis     Cancer     colon    Colonic mass     DVT (deep venous thrombosis)     Hypertension      Past Surgical History:   Procedure Laterality Date     SECTION, LOW TRANSVERSE  2010    COLECTOMY N/A 2020    Procedure: COLECTOMY;  Surgeon: Sky Patel MD;  Location: [facility] OR;  Service: General;  Laterality: N/A;    FLEXIBLE SIGMOIDOSCOPY N/A 2020    Procedure: SIGMOIDOSCOPY, FLEXIBLE;  Surgeon: Ez Bartlett MD;  Location: [facility] ENDO;  Service: Endoscopy;  Laterality: N/A;    TUBAL LIGATION           Social History     Socioeconomic History    Marital status: Single     Spouse name: Not on file    Number of children: 8    Years of education: Not on file    Highest education level: Not on file   Occupational History    Not on file   Social Needs    Financial resource strain: Not on  file    Food insecurity:     Worry: Not on file     Inability: Not on file    Transportation needs:     Medical: Not on file     Non-medical: Not on file   Tobacco Use    Smoking status: Former Smoker     Years: 8.00     Types: Cigarettes, Cigars    Smokeless tobacco: Never Used    Tobacco comment: black & mals   Substance and Sexual Activity    Alcohol use: Never     Frequency: Never    Drug use: Not Currently     Comment: has not used in > 6 months    Sexual activity: Not Currently   Lifestyle    Physical activity:     Days per week: Not on file     Minutes per session: Not on file    Stress: Not on file   Relationships    Social connections:     Talks on phone: Not on file     Gets together: Not on file     Attends Christian service: Not on file     Active member of club or organization: Not on file     Attends meetings of clubs or organizations: Not on file     Relationship status: Not on file   Other Topics Concern    Patient feels they ought to cut down on drinking/drug use No    Patient annoyed by others criticizing their drinking/drug use No    Patient has felt bad or guilty about drinking/drug use No    Patient has had a drink/used drugs as an eye opener in the AM No   Social History Narrative    Not on file     Family History   Problem Relation Age of Onset    Hypertension Mother        Objective:    Vitals:    04/01/20 0830   BP: (!) 189/101   Pulse: 82   Resp: 18   Temp: 98 °F (36.7 °C)       No intake or output data in the 24 hours ending 04/01/20 0842    Physical exam:  General: Awake, NAD  HEENT: NC, AT, EOMI  Neck: supple, no TTP  Chest: symmetric expansion, no distress, normal WOB  Cardiovascular: RRR, normal cap refill  Abdomen: soft, non-distended, mildly tender in RUQ/LUQ.   Extremities: moves all, no edema  Neuro: Alert and Oriented  Skin: warm, dry    Invalid input(s): 24H      Imaging Results           CTA Chest Non-Coronary (PE Study) (Final result)  Result time 04/01/20  06:35:52    Final result by Chino Adame MD (04/01/20 06:35:52)                 Impression:      Findings consistent with moderate to prominent volume of pulmonary emboli involving the right lung as above.    Findings of the left upper abdomen concerning for air and fluid collection potentially relating to an infectious process/abscess, clinical and historical correlation is needed if previously unknown dedicated CT examination of the abdomen recommended.    The lungs demonstrate atelectatic changes well as areas of pulmonary infiltrate, as above.    The findings were reported to the ER at the time of dictation.    This report was flagged in Epic as abnormal.      Electronically signed by: Chino Adame  Date:    04/01/2020  Time:    06:35             Narrative:    EXAMINATION:  CTA CHEST NON CORONARY    CLINICAL HISTORY:  Chest pain, acute, PE suspected, intermed prob, positive D-dimer;    TECHNIQUE:  Low dose axial images, sagittal and coronal reformations were obtained from the thoracic inlet to the lung bases following the IV administration of 75 mL of Omnipaque 350.  Contrast timing was optimized to evaluate the pulmonary arteries.  MIP images were performed.    COMPARISON:  February 26, 2020    FINDINGS:  There is pulmonary arterial filling defect involving the distal aspect of the right main pulmonary artery with extension to involve pulmonary arterial vasculature on the right superiorly and more prominent inferiorly, this is moderate to prominent volume of right-sided pulmonary thromboembolic disease consistent with pulmonary embolus.  The pulmonary arterial vasculature on the left appear predominantly well aerated.    The thoracic aorta demonstrates mild opacification.  There is no enlarged mediastinal or hilar adenopathy appreciated.  Mild pericardial fluid noted.  There is a small left pleural effusion noted.    The lungs demonstrate atelectatic change including bandlike areas of opacity consistent  with atelectasis, with additional appearance of may relate to areas of mild pulmonary infiltrate bilaterally.  There is no evidence for pneumothorax.    Limited imaging of the upper abdomen demonstrates appearance most suspicious for air and fluid collection of the left upper abdomen, subdiaphragmatic, adjacent to the stomach and extending anterior to the stomach.  This is incompletely imaged, this may relate to an area of loculated air and fluid collection may relate to abscess collection, clinical and historical correlation is needed, if not previously known dedicated abdominal CT examination is recommended.  The visualized osseous structures demonstrate chronic change.                                  Assessment: 40 y.o. female with PMHx of obstructing colon cancer, s/p segmental colectomy. Was to have port placement today, however found to have right sided PE as well as possible intraabdominal abscess.     Plan:  -Admit to surgery service  -Start Therapeutic Lovenox 1mg/kg BID, will restart Eliquis on discharge  -Will obtain better imaging of abdomen. CT A/P with PO contrast, to assess abdominal fluid collection.   -plan for port placement tomorrow       Ravi Albright, PGYIII  [facility] Surgery Resident

## 2020-04-01 NOTE — ASSESSMENT & PLAN NOTE
CTA chest show,There is pulmonary arterial filling defect involving the distal aspect of the right main pulmonary artery with extension to involve pulmonary arterial vasculature on the right superiorly and more prominent inferiorly, this is moderate to prominent volume of right-sided pulmonary thromboembolic disease consistent with pulmonary embolus. She has been started on lovenox,  Patient is on RA at this time and denies chest pain and SOB.agree with eliquis at DC time.

## 2020-04-01 NOTE — ED TRIAGE NOTES
Pt reports that she is scheduled to have placement of a Port-A-Cath today and was sent by her cardiologist to rule out PE d/t elevated D-dimer. Pt denies any SOB, cough or chest pain.

## 2020-04-01 NOTE — ED PROVIDER NOTES
Encounter Date: 2020       History     Chief Complaint   Patient presents with    Pre-Op     Pt reports she is supposed to have same day surgery today to have port placed     HPI   Patient is a 40-year-old female with colon cancer who was scheduled to have placement of a Port-A-Cath today with .  She states that she was called by her cardiologist  at [facility] who stated that she had an elevated D-dimer and that she needed a CT for pulmonary embolus prior to getting her Port-A-Cath placed.  Patient denies shortness of breath or chest pain.    Review of patient's allergies indicates:  No Known Allergies  Past Medical History:   Diagnosis Date    Arthritis     Cancer     colon    Colonic mass     DVT (deep venous thrombosis)     Hypertension      Past Surgical History:   Procedure Laterality Date     SECTION, LOW TRANSVERSE  2010    COLECTOMY N/A 2020    Procedure: COLECTOMY;  Surgeon: Sky Patel MD;  Location: [facility] OR;  Service: General;  Laterality: N/A;    FLEXIBLE SIGMOIDOSCOPY N/A 2020    Procedure: SIGMOIDOSCOPY, FLEXIBLE;  Surgeon: Ez Bartlett MD;  Location: [facility] ENDO;  Service: Endoscopy;  Laterality: N/A;    TUBAL LIGATION           Family History   Problem Relation Age of Onset    Hypertension Mother      Social History     Tobacco Use    Smoking status: Former Smoker     Years: 8.00     Types: Cigarettes, Cigars    Smokeless tobacco: Never Used    Tobacco comment: black & mals   Substance Use Topics    Alcohol use: Never     Frequency: Never    Drug use: Not Currently     Comment: has not used in > 6 months     Review of Systems   Constitutional: Negative for chills, fatigue and fever.   HENT: Negative for congestion, ear discharge, ear pain, postnasal drip, rhinorrhea, sinus pressure, sneezing, sore throat and voice change.    Eyes: Negative for discharge and itching.   Respiratory: Negative for cough, shortness of breath and  wheezing.    Cardiovascular: Negative for chest pain, palpitations and leg swelling.   Gastrointestinal: Negative for abdominal pain, constipation, diarrhea, nausea and vomiting.   Endocrine: Negative for polydipsia, polyphagia and polyuria.   Genitourinary: Negative for dysuria, frequency, hematuria, urgency, vaginal bleeding, vaginal discharge and vaginal pain.   Musculoskeletal: Negative for arthralgias and myalgias.   Skin: Negative for rash and wound.   Neurological: Negative for dizziness, seizures, syncope, weakness and numbness.   Hematological: Negative for adenopathy. Does not bruise/bleed easily.   Psychiatric/Behavioral: Negative for self-injury and suicidal ideas. The patient is not nervous/anxious.        Physical Exam     Initial Vitals [04/01/20 0525]   BP Pulse Resp Temp SpO2   (!) 179/100 81 20 98.4 °F (36.9 °C) 97 %      MAP       --         Physical Exam    Nursing note and vitals reviewed.  Constitutional: She appears well-developed and well-nourished.   HENT:   Head: Normocephalic and atraumatic.   Right Ear: External ear normal.   Left Ear: External ear normal.   Nose: Nose normal.   Eyes: Conjunctivae and EOM are normal. Pupils are equal, round, and reactive to light. Right eye exhibits no discharge. Left eye exhibits no discharge.   Neck: Normal range of motion.   Cardiovascular: Regular rhythm, S1 normal, S2 normal and normal heart sounds. Exam reveals no gallop.    No murmur heard.  Pulmonary/Chest: Effort normal and breath sounds normal. No respiratory distress. She has no decreased breath sounds. She has no wheezes. She has no rhonchi. She has no rales.   Abdominal: She exhibits no distension.   Musculoskeletal: Normal range of motion.   Neurological: She is alert and oriented to person, place, and time.   Skin: Skin is dry. Capillary refill takes less than 2 seconds.         ED Course   Procedures  Labs Reviewed   CBC W/ AUTO DIFFERENTIAL - Abnormal; Notable for the following  components:       Result Value    RBC 3.57 (*)     Hemoglobin 9.8 (*)     Hematocrit 31.5 (*)     Mean Corpuscular Hemoglobin Conc 31.1 (*)     RDW 20.7 (*)     All other components within normal limits   ISTAT PROCEDURE - Abnormal; Notable for the following components:    POC BUN <3 (*)     POC Potassium 3.1 (*)     POC TCO2 (MEASURED) 36 (*)     POC Hematocrit 32 (*)     All other components within normal limits   APTT    Narrative:     Recoll. [id number] by LAR at 04/01/2020 07:49, reason: Specimen   hemolyzed   PROTIME-INR    Narrative:     Recoll. [id number] by LAR at 04/01/2020 07:49, reason: Specimen   hemolyzed   PREGNANCY TEST, URINE RAPID   ISTAT CHEM8          Imaging Results           CTA Chest Non-Coronary (PE Study) (Final result)  Result time 04/01/20 06:35:52    Final result by Chino Adame MD (04/01/20 06:35:52)                 Impression:      Findings consistent with moderate to prominent volume of pulmonary emboli involving the right lung as above.    Findings of the left upper abdomen concerning for air and fluid collection potentially relating to an infectious process/abscess, clinical and historical correlation is needed if previously unknown dedicated CT examination of the abdomen recommended.    The lungs demonstrate atelectatic changes well as areas of pulmonary infiltrate, as above.    The findings were reported to the ER at the time of dictation.    This report was flagged in Epic as abnormal.      Electronically signed by: Chino Adame  Date:    04/01/2020  Time:    06:35             Narrative:    EXAMINATION:  CTA CHEST NON CORONARY    CLINICAL HISTORY:  Chest pain, acute, PE suspected, intermed prob, positive D-dimer;    TECHNIQUE:  Low dose axial images, sagittal and coronal reformations were obtained from the thoracic inlet to the lung bases following the IV administration of 75 mL of Omnipaque 350.  Contrast timing was optimized to evaluate the pulmonary arteries.  MIP  images were performed.    COMPARISON:  2020    FINDINGS:  There is pulmonary arterial filling defect involving the distal aspect of the right main pulmonary artery with extension to involve pulmonary arterial vasculature on the right superiorly and more prominent inferiorly, this is moderate to prominent volume of right-sided pulmonary thromboembolic disease consistent with pulmonary embolus.  The pulmonary arterial vasculature on the left appear predominantly well aerated.    The thoracic aorta demonstrates mild opacification.  There is no enlarged mediastinal or hilar adenopathy appreciated.  Mild pericardial fluid noted.  There is a small left pleural effusion noted.    The lungs demonstrate atelectatic change including bandlike areas of opacity consistent with atelectasis, with additional appearance of may relate to areas of mild pulmonary infiltrate bilaterally.  There is no evidence for pneumothorax.    Limited imaging of the upper abdomen demonstrates appearance most suspicious for air and fluid collection of the left upper abdomen, subdiaphragmatic, adjacent to the stomach and extending anterior to the stomach.  This is incompletely imaged, this may relate to an area of loculated air and fluid collection may relate to abscess collection, clinical and historical correlation is needed, if not previously known dedicated abdominal CT examination is recommended.  The visualized osseous structures demonstrate chronic change.                                 Medical Decision Making:   ED Management:  Jace Alva dictatin-year-old female with colon cancer, partial colectomy in 2020, presents with persistent mild upper abdominal pain since surgery, with plan for Port-A-Cath today to start chemotherapy.  She was made aware of an elevated D-dimer and low potassium by and outside service.  CT performed in the ED reveals right-sided PE.  She continues to be asymptomatic for PE with normal oxygen  saturation and heart rate in the ED. Also observed on PE study is a fluid collection adjacent to the stomach concerning for possible abscess.  She does have some mild mid upper abdominal tenderness with palpable mass.  Vertical incision is well healing, c/d/i.  She is afebrile.  No leukocytosis.  Dr. Patel made aware.  Will place in observation under general surgery with consult to Hospital Medicine.  Also spoke with Dr. Chapin with hospital medicine.  Will start Lovenox and replace potassium. Gen surgery resident evaluating pt in the ED and will place admit orders, including anticoagulation.  Patient is amenable to being placed in observation.  Case also discussed with ED attending Dr. Cook.       APC / Resident Notes:   40-year-old female who was reported to have an elevated D-dimer and scheduled for a same-day surgery today.  After speaking with both Mr. Church (NP for Dr. Rebollar who had no knowledge of the elevated d-dimer but requested we scan the patient) and Dr. Patel, I elected to order the CTA chest.                ED Course as of Apr 01 0837 Wed Apr 01, 2020   0557 ISTAT chem 8 with 3.1K, otherwise normal.      [VC]   0607 SBAR given to Jace MARCANO PA-C; my care ends now.      [VC]      ED Course User Index  [VC] Blaine Miranda DNP                Clinical Impression:       ICD-10-CM ICD-9-CM   1. Acute pulmonary embolism, unspecified pulmonary embolism type, unspecified whether acute cor pulmonale present I26.99 415.19   2. Generalized abdominal pain R10.84 789.07             ED Disposition Condition    Observation                           Jace Alva PA-C  04/01/20 0837

## 2020-04-01 NOTE — ED NOTES
Patient informed of need for urine sample. States unable to void at present time. Specimen cup left with patient.

## 2020-04-01 NOTE — CONSULTS
[facility] Hospital Medicine  Consult Note    Patient Name: Maryam Oneal  MRN: [id number]  Admission Date: 2020  Hospital Length of Stay: 0 days  Attending Physician: Sky Patel MD   Primary Care Provider: Phuong Reynoso MD           Patient information was obtained from patient, past medical records and ER records.     Consults  Subjective:     Principal Problem: Acute pulmonary embolism    Chief Complaint:   Chief Complaint   Patient presents with    Pre-Op     Pt reports she is supposed to have same day surgery today to have port placed        HPI: 38yo woman who initially presented with a large bowel obstruction in February, and underwent segmental colectomy and anastomosis, for colon malignancy. Was to have Port-a-cath placed today as elective procedure. However, prior to this her outpatient cardiologist felt she was high risk for PE due to elevated D-Dimer and sent her to the ED for CT scan prior to the procedure. CT chest shows moderate right sided PE. She has a hx of DVT in the past and was on Eliquis, however had stopped this long before her prior admission. She was restarted on Eliquis on her last admission but did not continue it as outpatient as instructed. She states she does not feel SOB or have any chest pain.      She does complain of abdominal pain which has been ongoing mildly since her surgery. Located at upper abdomen. No vomiting, does have occasional nausea. BMs have been normal and she tolerates PO. On her CTA, the upper abdomen shows a possible air/fluid collection.     Past Medical History:   Diagnosis Date    Arthritis     Cancer     colon    Colonic mass     DVT (deep venous thrombosis)     Hypertension        Past Surgical History:   Procedure Laterality Date     SECTION, LOW TRANSVERSE  2010    COLECTOMY N/A 2020    Procedure: COLECTOMY;  Surgeon: Sky Patel MD;  Location: [facility];  Service: General;  Laterality: N/A;     FLEXIBLE SIGMOIDOSCOPY N/A 2/21/2020    Procedure: SIGMOIDOSCOPY, FLEXIBLE;  Surgeon: Ez Bartlett MD;  Location: [facility];  Service: Endoscopy;  Laterality: N/A;    TUBAL LIGATION      2010       Review of patient's allergies indicates:  No Known Allergies    No current facility-administered medications on file prior to encounter.      Current Outpatient Medications on File Prior to Encounter   Medication Sig    apixaban (ELIQUIS) 2.5 mg Tab Take 2.5 mg by mouth 2 (two) times daily.     clotrimazole-betamethasone 1-0.05% (LOTRISONE) cream Apply topically 2 (two) times daily.    losartan-hydrochlorothiazide 100-25 mg (HYZAAR) 100-25 mg per tablet Take 1 tablet by mouth once daily.    nystatin (MYCOSTATIN) 100,000 unit/mL suspension Place 5 mL in your mouth, swish and retain in mouth for as long as possible (several minutes) before swallowing. Do this four times daily for one week.    ondansetron (ZOFRAN-ODT) 4 MG TbDL Take 1 tablet (4 mg total) by mouth every 6 (six) hours as needed (nausea).    oxyCODONE-acetaminophen (PERCOCET)  mg per tablet Take 1 tablet by mouth every 6 (six) hours as needed for Pain.    promethazine (PHENERGAN) 12.5 MG Tab Take 1 tablet (12.5 mg total) by mouth every 6 (six) hours as needed (nausea).     Family History     Problem Relation (Age of Onset)    Hypertension Mother        Tobacco Use    Smoking status: Former Smoker     Years: 8.00     Types: Cigarettes, Cigars    Smokeless tobacco: Never Used    Tobacco comment: black & mals   Substance and Sexual Activity    Alcohol use: Never     Frequency: Never    Drug use: Not Currently     Comment: has not used in > 6 months    Sexual activity: Not Currently     Review of Systems   Constitutional: Positive for activity change and appetite change.   HENT: Negative for congestion and dental problem.    Respiratory: Negative for chest tightness.    Cardiovascular: Negative for chest pain.   Gastrointestinal: Positive for  abdominal pain.   Endocrine: Negative for cold intolerance and heat intolerance.   Musculoskeletal: Negative for arthralgias and back pain.   Skin: Negative for color change and pallor.   Allergic/Immunologic: Negative for food allergies.   Neurological: Negative for dizziness and facial asymmetry.   Hematological: Negative for adenopathy. Does not bruise/bleed easily.   Psychiatric/Behavioral: Negative for agitation and behavioral problems.     Objective:     Vital Signs (Most Recent):  Temp: 98 °F (36.7 °C) (04/01/20 0830)  Pulse: 77 (04/01/20 1002)  Resp: 18 (04/01/20 1002)  BP: (!) 181/111 (04/01/20 1002)  SpO2: 95 % (04/01/20 1002) Vital Signs (24h Range):  Temp:  [98 °F (36.7 °C)-98.4 °F (36.9 °C)] 98 °F (36.7 °C)  Pulse:  [77-82] 77  Resp:  [18-20] 18  SpO2:  [95 %-97 %] 95 %  BP: (179-189)/(100-111) 181/111     Weight: (!) 171.5 kg (378 lb)  Body mass index is 46.01 kg/m².    Physical Exam   Constitutional: She is oriented to person, place, and time. No distress.   HENT:   Head: Atraumatic.   Eyes: Pupils are equal, round, and reactive to light. EOM are normal.   Neck: Normal range of motion. Neck supple.   Cardiovascular: Normal rate.   Pulmonary/Chest: Effort normal and breath sounds normal.   Abdominal: Soft. She exhibits distension. There is tenderness.   Musculoskeletal: Normal range of motion. She exhibits edema. She exhibits no deformity.   Neurological: She is oriented to person, place, and time. No cranial nerve deficit. Coordination normal.   Skin: Skin is warm and dry.   Psychiatric: She has a normal mood and affect. Her behavior is normal.       Significant Labs:   BMP:   Recent Labs   Lab 03/31/20  0927         K 2.7*      CO2 30*   BUN 2*   CREATININE 0.7   CALCIUM 8.0*     CBC:   Recent Labs   Lab 03/31/20  0927 04/01/20  0540 04/01/20  0548   WBC 5.51 5.19  --    HGB 9.7* 9.8*  --    HCT 31.3* 31.5* 32*    294  --      CMP:   Recent Labs   Lab 03/31/20  0927       K 2.7*      CO2 30*      BUN 2*   CREATININE 0.7   CALCIUM 8.0*   PROT 6.9   ALBUMIN 2.3*   BILITOT 0.6   ALKPHOS 72   AST 13   ALT 13   ANIONGAP 9   EGFRNONAA >60       Significant Imaging: reviewed.    Assessment/Plan:     * Acute pulmonary embolism  CTA chest show,There is pulmonary arterial filling defect involving the distal aspect of the right main pulmonary artery with extension to involve pulmonary arterial vasculature on the right superiorly and more prominent inferiorly, this is moderate to prominent volume of right-sided pulmonary thromboembolic disease consistent with pulmonary embolus. She has been started on lovenox,  Patient is on RA at this time and denies chest pain and SOB.agree with eliquis at DC time.      Hypokalemia  Replaced.      Essential hypertension  On HCTZ,losartan,uncontroled,added Norvasc and prn clonidine.      Intraabdominal fluid collection  Per CT, abdomen,suregry is following.      History of colon cancer  Plan for port a cath placement by surgery in AM.      Morbid obesity with BMI of 50.0-59.9, adult  Weight lose as out patient.      Anemia of chronic disease  Will monitor.        VTE Risk Mitigation (From admission, onward)         Ordered     enoxaparin injection 170 mg  Every 12 hours      04/01/20 0841     Place sequential compression device  Until discontinued      04/01/20 0841     IP VTE HIGH RISK PATIENT  Once      04/01/20 0841                    Thank you for your consult. I will follow-up with patient. Please contact us if you have any additional questions.    Uziel Hill MD  Department of Hospital Medicine   [facility]

## 2020-04-01 NOTE — ED NOTES
Retook BP with large cuff, was 189/101 right arm, patient reported has not taken BP medication in 2 days. Notified provider.

## 2020-04-01 NOTE — HPI
38yo woman who initially presented with a large bowel obstruction in February, and underwent segmental colectomy and anastomosis, for colon malignancy. Was to have Port-a-cath placed today as elective procedure. However, prior to this her outpatient cardiologist felt she was high risk for PE due to elevated D-Dimer and sent her to the ED for CT scan prior to the procedure. CT chest shows moderate right sided PE. She has a hx of DVT in the past and was on Eliquis, however had stopped this long before her prior admission. She was restarted on Eliquis on her last admission but did not continue it as outpatient as instructed. She states she does not feel SOB or have any chest pain.      She does complain of abdominal pain which has been ongoing mildly since her surgery. Located at upper abdomen. No vomiting, does have occasional nausea. BMs have been normal and she tolerates PO. On her CTA, the upper abdomen shows a possible air/fluid collection.

## 2020-04-01 NOTE — ED NOTES
Radiology tech arrived at same time as transport tech to bring pt to CT. Transport tech states she will bring pt to CT first then up to 250D from there. Pt off unit at this time

## 2020-04-01 NOTE — CONSULTS
IR consult to assess for potential percutaneous aspiration of free intra-peritoneal air.     I have reviewed the CT A/P and I agree there is a moderate-sized air collection in upper in the abdomen with no evidence of free fluid or fluid collection.     No indication for aspiration of free intra-peritoneal.     Please feel free to contact IR with any questions?    Thank you for considering IR for the care of your patient.     Serge Campbell MD  Interventional Radiology

## 2020-04-01 NOTE — ANESTHESIA PREPROCEDURE EVALUATION
04/01/2020  Maryam Oneal is a 40 y.o., female.    Pre-operative evaluation for Procedure(s) (LRB):  [id number] (N/A)    Maryam Oneal is a 40 y.o. female     Denies CP/SOB/MI/CVA/URI symptoms.  Occasional GERD with certain foods.  METS < 4  NPO instructions given  Acute PE on therapeutic lovenox    Patient Active Problem List   Diagnosis    Headache    Chest pain    Acute deep vein thrombosis (DVT) of left lower extremity    HTN (hypertension)    Anemia of chronic disease    Morbid obesity with BMI of 50.0-59.9, adult    Peripheral edema    DVT (deep venous thrombosis)    Major depressive disorder, recurrent episode, severe    Depression    Large bowel obstruction    Abdominal pain    Paroxysmal tachycardia    Acute pulmonary embolism    History of colon cancer    Intraabdominal fluid collection    Essential hypertension    Hypokalemia       Review of patient's allergies indicates:  No Known Allergies    No current facility-administered medications on file prior to encounter.      Current Outpatient Medications on File Prior to Encounter   Medication Sig Dispense Refill    apixaban (ELIQUIS) 2.5 mg Tab Take 2.5 mg by mouth 2 (two) times daily.       clotrimazole-betamethasone 1-0.05% (LOTRISONE) cream Apply topically 2 (two) times daily. 1 Tube 5    losartan-hydrochlorothiazide 100-25 mg (HYZAAR) 100-25 mg per tablet Take 1 tablet by mouth once daily.      nystatin (MYCOSTATIN) 100,000 unit/mL suspension Place 5 mL in your mouth, swish and retain in mouth for as long as possible (several minutes) before swallowing. Do this four times daily for one week. 120 mL 0    ondansetron (ZOFRAN-ODT) 4 MG TbDL Take 1 tablet (4 mg total) by mouth every 6 (six) hours as needed (nausea). 10 tablet 0    oxyCODONE-acetaminophen (PERCOCET)  mg per tablet Take 1 tablet by mouth every 6  (six) hours as needed for Pain. 12 tablet 0    promethazine (PHENERGAN) 12.5 MG Tab Take 1 tablet (12.5 mg total) by mouth every 6 (six) hours as needed (nausea). 10 tablet 0       Past Surgical History:   Procedure Laterality Date     SECTION, LOW TRANSVERSE  2010    COLECTOMY N/A 2020    Procedure: COLECTOMY;  Surgeon: Sky Patel MD;  Location: [facility] OR;  Service: General;  Laterality: N/A;    FLEXIBLE SIGMOIDOSCOPY N/A 2020    Procedure: SIGMOIDOSCOPY, FLEXIBLE;  Surgeon: Ez Bartlett MD;  Location: [facility] ENDO;  Service: Endoscopy;  Laterality: N/A;    TUBAL LIGATION             Social History     Socioeconomic History    Marital status: Single     Spouse name: Not on file    Number of children: 8    Years of education: Not on file    Highest education level: Not on file   Occupational History    Not on file   Social Needs    Financial resource strain: Not on file    Food insecurity:     Worry: Not on file     Inability: Not on file    Transportation needs:     Medical: Not on file     Non-medical: Not on file   Tobacco Use    Smoking status: Former Smoker     Years: 8.00     Types: Cigarettes, Cigars    Smokeless tobacco: Never Used    Tobacco comment: black & mals   Substance and Sexual Activity    Alcohol use: Never     Frequency: Never    Drug use: Not Currently     Comment: has not used in > 6 months    Sexual activity: Not Currently   Lifestyle    Physical activity:     Days per week: Not on file     Minutes per session: Not on file    Stress: Not on file   Relationships    Social connections:     Talks on phone: Not on file     Gets together: Not on file     Attends [employer] service: Not on file     Active member of club or organization: Not on file     Attends meetings of clubs or organizations: Not on file     Relationship status: Not on file   Other Topics Concern    Patient feels they ought to cut down on drinking/drug use No    Patient annoyed  by others criticizing their drinking/drug use No    Patient has felt bad or guilty about drinking/drug use No    Patient has had a drink/used drugs as an eye opener in the AM No   Social History Narrative    Not on file         CBC:   Recent Labs     03/31/20  0927 04/01/20  0540 04/01/20  0548   WBC 5.51 5.19  --    RBC 3.66* 3.57*  --    HGB 9.7* 9.8*  --    HCT 31.3* 31.5* 32*    294  --    MCV 86 88  --    MCH 26.5* 27.5  --    MCHC 31.0* 31.1*  --        CMP:   Recent Labs     03/31/20  0927 04/01/20  1204     --    K 2.7* 2.8*     --    CO2 30*  --    BUN 2*  --    CREATININE 0.7  --      --    CALCIUM 8.0*  --    ALBUMIN 2.3*  --    PROT 6.9  --    ALKPHOS 72  --    ALT 13  --    AST 13  --    BILITOT 0.6  --        INR  Recent Labs     04/01/20  0815   INR 1.0   APTT 26.5         Vitals:    04/01/20 1139   BP: (!) 222/95   Pulse:    Resp:    Temp:      See nursing charting for additional vital signs      Diagnostic Studies:  Results for KIET CHRISTENSEN (MRN [id number]) as of 4/1/2020 15:15   Ref. Range 4/1/2020 05:40   WBC Latest Ref Range: 3.90 - 12.70 K/uL 5.19   RBC Latest Ref Range: 4.00 - 5.40 M/uL 3.57 (L)   Hemoglobin Latest Ref Range: 12.0 - 16.0 g/dL 9.8 (L)   Hematocrit Latest Ref Range: 37.0 - 48.5 % 31.5 (L)   MCV Latest Ref Range: 82 - 98 fL 88   MCH Latest Ref Range: 27.0 - 31.0 pg 27.5   MCHC Latest Ref Range: 32.0 - 36.0 g/dL 31.1 (L)   RDW Latest Ref Range: 11.5 - 14.5 % 20.7 (H)   Platelets Latest Ref Range: 150 - 350 K/uL 294   MPV Latest Ref Range: 9.2 - 12.9 fL 10.5   Gran% Latest Ref Range: 38.0 - 73.0 % 49.9   Gran # (ANC) Latest Ref Range: 1.8 - 7.7 K/uL 2.6   Lymph% Latest Ref Range: 18.0 - 48.0 % 38.0   Lymph # Latest Ref Range: 1.0 - 4.8 K/uL 2.0   Mono% Latest Ref Range: 4.0 - 15.0 % 9.2   Mono # Latest Ref Range: 0.3 - 1.0 K/uL 0.5   Eosinophil% Latest Ref Range: 0.0 - 8.0 % 2.1   Eos # Latest Ref Range: 0.0 - 0.5 K/uL 0.1   Basophil% Latest Ref Range: 0.0 -  1.9 % 0.4   Baso # Latest Ref Range: 0.00 - 0.20 K/uL 0.02     Results for KIET CHRISTENSEN (MRN [id number]) as of 4/1/2020 15:15   Ref. Range 3/31/2020 09:27   Sodium Latest Ref Range: 136 - 145 mmol/L 140   Potassium Latest Ref Range: 3.5 - 5.1 mmol/L 2.7 (LL)   Chloride Latest Ref Range: 95 - 110 mmol/L 101   CO2 Latest Ref Range: 23 - 29 mmol/L 30 (H)   Anion Gap Latest Ref Range: 8 - 16 mmol/L 9   BUN, Bld Latest Ref Range: 6 - 20 mg/dL 2 (L)   Creatinine Latest Ref Range: 0.5 - 1.4 mg/dL 0.7   eGFR if non African American Latest Ref Range: >60 mL/min/1.73 m^2 >60   eGFR if  Latest Ref Range: >60 mL/min/1.73 m^2 >60   Glucose Latest Ref Range: 70 - 110 mg/dL 109   Calcium Latest Ref Range: 8.7 - 10.5 mg/dL 8.0 (L)   Alkaline Phosphatase Latest Ref Range: 55 - 135 U/L 72   PROTEIN TOTAL Latest Ref Range: 6.0 - 8.4 g/dL 6.9   Albumin Latest Ref Range: 3.5 - 5.2 g/dL 2.3 (L)   BILIRUBIN TOTAL Latest Ref Range: 0.1 - 1.0 mg/dL 0.6   AST Latest Ref Range: 10 - 40 U/L 13   ALT Latest Ref Range: 10 - 44 U/L 13   Results for KIET CHRISTENSEN (MRN [id number]) as of 4/1/2020 15:15   Ref. Range 4/1/2020 12:04   Potassium Latest Ref Range: 3.5 - 5.1 mmol/L 2.8 (L)       EKG (2/20/20):  Normal sinus rhythm  Normal ECG    TTE (2/26/20):  · Normal left ventricular systolic function. The estimated ejection fraction is 70%.  · Concentric left ventricular hypertrophy.  · Normal LV diastolic function.  · Normal right ventricular systolic function.  · Mild tricuspid regurgitation.  · Very TDS  · Sinus tachycardia 130s during study    CTA Chest (4/1/20):  Impression       Findings consistent with moderate to prominent volume of pulmonary emboli involving the right lung as above.    Findings of the left upper abdomen concerning for air and fluid collection potentially relating to an infectious process/abscess, clinical and historical correlation is needed if previously unknown dedicated CT examination of the abdomen  recommended.    The lungs demonstrate atelectatic changes well as areas of pulmonary infiltrate, as above.         Anesthesia Evaluation    I have reviewed the Patient Summary Reports.    I have reviewed the Nursing Notes.      Review of Systems  Anesthesia Hx:  No problems with previous Anesthesia   Denies Personal Hx of Anesthesia complications.   Social:  Non-Smoker, No Alcohol Use    Hematology/Oncology:         -- Anemia: Current/Recent Cancer. Oncology Comments: Current colon CA    Cardiovascular:   Exercise tolerance: poor Hypertension, well controlled ECG has been reviewed. DVT  Echo 70 perc  cxr shows pe most likely from pe   Pulmonary:   Acute PE on therapeutic lovenox   Renal/:   Hypokalemia being corrected with IV potassium   Hepatic/GI:   GERD, well controlled    Neurological:   Headaches    Psych:   Psychiatric History depression          Physical Exam  General:  Morbid Obesity    Airway/Jaw/Neck:  Airway Findings: Mallampati: II TM Distance: < 4 cm  MP3, TMD >3FB, teeth intact    Dental:  DENTAL FINDINGS: Normal   Chest/Lungs:  Chest/Lungs Clear    Heart/Vascular:  Heart Findings: Normal       Mental Status:  Mental Status Findings:  Cooperative, Alert and Oriented         Anesthesia Plan  Type of Anesthesia, risks & benefits discussed:  Anesthesia Type:  general, MAC  Patient's Preference:   Intra-op Monitoring Plan: standard ASA monitors  Intra-op Monitoring Plan Comments:   Post Op Pain Control Plan: multimodal analgesia, IV/PO Opioids PRN and per primary service following discharge from PACU  Post Op Pain Control Plan Comments:   Induction:   IV  Beta Blocker:  Patient is not currently on a Beta-Blocker (No further documentation required).       Informed Consent: Patient understands risks and agrees with Anesthesia plan.  Questions answered. Anesthesia consent signed with patient.  ASA Score: 3     Day of Surgery Review of History & Physical:  There are no significant changes.  H&P update  referred to the provider.  H&P completed by Anesthesiologist.   Anesthesia Plan Notes: Hypokalemia being corrected with BMP check at 0400, acute PE on therapeutic lovenox  Npo  Elective placement of port of cath..patient is non compliant with anticogulants..most likely patient is having acute nonsymptomatic  Pe..primary feels procedure needs to be done despite patients condiation for cancer tx        Ready For Surgery From Anesthesia Perspective.

## 2020-04-01 NOTE — HOSPITAL COURSE
40 years old female with known history of coon caner,folowed by surgery,S/P segmental colectomy and anastomosis,and should be have jordyn a cath for starting chemo. Has been told visit ER duo to elevated D-Dimmer.CTA chest show,There is pulmonary arterial filling defect involving the distal aspect of the right main pulmonary artery with extension to involve pulmonary arterial vasculature on the right superiorly and more prominent inferiorly, this is moderate to prominent volume of right-sided pulmonary thromboembolic disease consistent with pulmonary embolus. She has been started on lovenox,  CT abdomen show also show possible fluid collection and free air,suretgry is admitted patient.  Patient is on RA at this time and denies chest pain and SOB.  Will have port a cath today.  Stable on RA,patient has already eliquis at home.

## 2020-04-01 NOTE — SUBJECTIVE & OBJECTIVE
Past Medical History:   Diagnosis Date    Arthritis     Cancer     colon    Colonic mass     DVT (deep venous thrombosis)     Hypertension        Past Surgical History:   Procedure Laterality Date     SECTION, LOW TRANSVERSE  2010    COLECTOMY N/A 2020    Procedure: COLECTOMY;  Surgeon: Sky Patel MD;  Location: [facility] OR;  Service: General;  Laterality: N/A;    FLEXIBLE SIGMOIDOSCOPY N/A 2020    Procedure: SIGMOIDOSCOPY, FLEXIBLE;  Surgeon: Ez Bartlett MD;  Location: [facility] ENDO;  Service: Endoscopy;  Laterality: N/A;    TUBAL LIGATION             Review of patient's allergies indicates:  No Known Allergies    No current facility-administered medications on file prior to encounter.      Current Outpatient Medications on File Prior to Encounter   Medication Sig    apixaban (ELIQUIS) 2.5 mg Tab Take 2.5 mg by mouth 2 (two) times daily.     clotrimazole-betamethasone 1-0.05% (LOTRISONE) cream Apply topically 2 (two) times daily.    losartan-hydrochlorothiazide 100-25 mg (HYZAAR) 100-25 mg per tablet Take 1 tablet by mouth once daily.    nystatin (MYCOSTATIN) 100,000 unit/mL suspension Place 5 mL in your mouth, swish and retain in mouth for as long as possible (several minutes) before swallowing. Do this four times daily for one week.    ondansetron (ZOFRAN-ODT) 4 MG TbDL Take 1 tablet (4 mg total) by mouth every 6 (six) hours as needed (nausea).    oxyCODONE-acetaminophen (PERCOCET)  mg per tablet Take 1 tablet by mouth every 6 (six) hours as needed for Pain.    promethazine (PHENERGAN) 12.5 MG Tab Take 1 tablet (12.5 mg total) by mouth every 6 (six) hours as needed (nausea).     Family History     Problem Relation (Age of Onset)    Hypertension Mother        Tobacco Use    Smoking status: Former Smoker     Years: 8.00     Types: Cigarettes, Cigars    Smokeless tobacco: Never Used    Tobacco comment: black & mals   Substance and Sexual Activity    Alcohol use:  Never     Frequency: Never    Drug use: Not Currently     Comment: has not used in > 6 months    Sexual activity: Not Currently     Review of Systems   Constitutional: Positive for activity change and appetite change.   HENT: Negative for congestion and dental problem.    Respiratory: Negative for chest tightness.    Cardiovascular: Negative for chest pain.   Gastrointestinal: Positive for abdominal pain.   Endocrine: Negative for cold intolerance and heat intolerance.   Musculoskeletal: Negative for arthralgias and back pain.   Skin: Negative for color change and pallor.   Allergic/Immunologic: Negative for food allergies.   Neurological: Negative for dizziness and facial asymmetry.   Hematological: Negative for adenopathy. Does not bruise/bleed easily.   Psychiatric/Behavioral: Negative for agitation and behavioral problems.     Objective:     Vital Signs (Most Recent):  Temp: 98 °F (36.7 °C) (04/01/20 0830)  Pulse: 77 (04/01/20 1002)  Resp: 18 (04/01/20 1002)  BP: (!) 181/111 (04/01/20 1002)  SpO2: 95 % (04/01/20 1002) Vital Signs (24h Range):  Temp:  [98 °F (36.7 °C)-98.4 °F (36.9 °C)] 98 °F (36.7 °C)  Pulse:  [77-82] 77  Resp:  [18-20] 18  SpO2:  [95 %-97 %] 95 %  BP: (179-189)/(100-111) 181/111     Weight: (!) 171.5 kg (378 lb)  Body mass index is 46.01 kg/m².    Physical Exam   Constitutional: She is oriented to person, place, and time. No distress.   HENT:   Head: Atraumatic.   Eyes: Pupils are equal, round, and reactive to light. EOM are normal.   Neck: Normal range of motion. Neck supple.   Cardiovascular: Normal rate.   Pulmonary/Chest: Effort normal and breath sounds normal.   Abdominal: Soft. She exhibits distension. There is tenderness.   Musculoskeletal: Normal range of motion. She exhibits edema. She exhibits no deformity.   Neurological: She is oriented to person, place, and time. No cranial nerve deficit. Coordination normal.   Skin: Skin is warm and dry.   Psychiatric: She has a normal mood and  affect. Her behavior is normal.       Significant Labs:   BMP:   Recent Labs   Lab 03/31/20  0927         K 2.7*      CO2 30*   BUN 2*   CREATININE 0.7   CALCIUM 8.0*     CBC:   Recent Labs   Lab 03/31/20  0927 04/01/20  0540 04/01/20  0548   WBC 5.51 5.19  --    HGB 9.7* 9.8*  --    HCT 31.3* 31.5* 32*    294  --      CMP:   Recent Labs   Lab 03/31/20  0927      K 2.7*      CO2 30*      BUN 2*   CREATININE 0.7   CALCIUM 8.0*   PROT 6.9   ALBUMIN 2.3*   BILITOT 0.6   ALKPHOS 72   AST 13   ALT 13   ANIONGAP 9   EGFRNONAA >60       Significant Imaging: reviewed.

## 2020-04-01 NOTE — PLAN OF CARE
Patient pain controlled with medications.     Problem: Pain Acute  Goal: Optimal Pain Control  Outcome: Ongoing, Progressing  Intervention: Develop Pain Management Plan  Flowsheets (Taken 4/1/2020 1814)  Pain Management Interventions: around-the-clock dosing utilized

## 2020-04-02 VITALS
TEMPERATURE: 98 F | DIASTOLIC BLOOD PRESSURE: 62 MMHG | HEIGHT: 72 IN | OXYGEN SATURATION: 98 % | HEART RATE: 81 BPM | BODY MASS INDEX: 39.68 KG/M2 | RESPIRATION RATE: 18 BRPM | WEIGHT: 293 LBS | SYSTOLIC BLOOD PRESSURE: 116 MMHG

## 2020-04-02 LAB
ANION GAP SERPL CALC-SCNC: 10 MMOL/L (ref 8–16)
BASOPHILS # BLD AUTO: 0.02 K/UL (ref 0–0.2)
BASOPHILS NFR BLD: 0.5 % (ref 0–1.9)
BUN SERPL-MCNC: 4 MG/DL (ref 6–20)
CALCIUM SERPL-MCNC: 7.7 MG/DL (ref 8.7–10.5)
CHLORIDE SERPL-SCNC: 100 MMOL/L (ref 95–110)
CO2 SERPL-SCNC: 28 MMOL/L (ref 23–29)
CREAT SERPL-MCNC: 0.7 MG/DL (ref 0.5–1.4)
DIFFERENTIAL METHOD: ABNORMAL
EOSINOPHIL # BLD AUTO: 0.1 K/UL (ref 0–0.5)
EOSINOPHIL NFR BLD: 2.5 % (ref 0–8)
ERYTHROCYTE [DISTWIDTH] IN BLOOD BY AUTOMATED COUNT: 21.3 % (ref 11.5–14.5)
EST. GFR  (AFRICAN AMERICAN): >60 ML/MIN/1.73 M^2
EST. GFR  (NON AFRICAN AMERICAN): >60 ML/MIN/1.73 M^2
GLUCOSE SERPL-MCNC: 93 MG/DL (ref 70–110)
HCT VFR BLD AUTO: 28.7 % (ref 37–48.5)
HGB BLD-MCNC: 8.6 G/DL (ref 12–16)
IMM GRANULOCYTES # BLD AUTO: 0.03 K/UL (ref 0–0.04)
IMM GRANULOCYTES NFR BLD AUTO: 0.7 % (ref 0–0.5)
LYMPHOCYTES # BLD AUTO: 1.9 K/UL (ref 1–4.8)
LYMPHOCYTES NFR BLD: 46.4 % (ref 18–48)
MCH RBC QN AUTO: 27 PG (ref 27–31)
MCHC RBC AUTO-ENTMCNC: 30 G/DL (ref 32–36)
MCV RBC AUTO: 90 FL (ref 82–98)
MONOCYTES # BLD AUTO: 0.5 K/UL (ref 0.3–1)
MONOCYTES NFR BLD: 11.1 % (ref 4–15)
NEUTROPHILS # BLD AUTO: 1.6 K/UL (ref 1.8–7.7)
NEUTROPHILS NFR BLD: 38.8 % (ref 38–73)
NRBC BLD-RTO: 0 /100 WBC
PLATELET # BLD AUTO: 298 K/UL (ref 150–350)
PMV BLD AUTO: 9.3 FL (ref 9.2–12.9)
POTASSIUM SERPL-SCNC: 3.2 MMOL/L (ref 3.5–5.1)
RBC # BLD AUTO: 3.19 M/UL (ref 4–5.4)
SODIUM SERPL-SCNC: 138 MMOL/L (ref 136–145)
WBC # BLD AUTO: 4.07 K/UL (ref 3.9–12.7)

## 2020-04-02 PROCEDURE — 25000003 PHARM REV CODE 250: Performed by: NURSE ANESTHETIST, CERTIFIED REGISTERED

## 2020-04-02 PROCEDURE — 71000033 HC RECOVERY, INTIAL HOUR: Performed by: SURGERY

## 2020-04-02 PROCEDURE — 63600175 PHARM REV CODE 636 W HCPCS: Performed by: SURGERY

## 2020-04-02 PROCEDURE — 25000003 PHARM REV CODE 250

## 2020-04-02 PROCEDURE — 36000707: Performed by: SURGERY

## 2020-04-02 PROCEDURE — 36415 COLL VENOUS BLD VENIPUNCTURE: CPT

## 2020-04-02 PROCEDURE — 63600175 PHARM REV CODE 636 W HCPCS: Performed by: NURSE ANESTHETIST, CERTIFIED REGISTERED

## 2020-04-02 PROCEDURE — C1788 PORT, INDWELLING, IMP: HCPCS | Performed by: SURGERY

## 2020-04-02 PROCEDURE — D9220A PRA ANESTHESIA: Mod: CRNA,,, | Performed by: NURSE ANESTHETIST, CERTIFIED REGISTERED

## 2020-04-02 PROCEDURE — 25000003 PHARM REV CODE 250: Performed by: HOSPITALIST

## 2020-04-02 PROCEDURE — 85025 COMPLETE CBC W/AUTO DIFF WBC: CPT

## 2020-04-02 PROCEDURE — D9220A PRA ANESTHESIA: ICD-10-PCS | Mod: CRNA,,, | Performed by: NURSE ANESTHETIST, CERTIFIED REGISTERED

## 2020-04-02 PROCEDURE — D9220A PRA ANESTHESIA: Mod: ANES,,, | Performed by: ANESTHESIOLOGY

## 2020-04-02 PROCEDURE — 37000009 HC ANESTHESIA EA ADD 15 MINS: Performed by: SURGERY

## 2020-04-02 PROCEDURE — 77001 CHG FLUOROGUIDE CNTRL VEN ACCESS,PLACE,REPLACE,REMOVE: ICD-10-PCS | Mod: 26,,, | Performed by: SURGERY

## 2020-04-02 PROCEDURE — 36561 PR INSERT TUNNELED CV CATH WITH PORT: ICD-10-PCS | Mod: 58,LT,, | Performed by: SURGERY

## 2020-04-02 PROCEDURE — 37000008 HC ANESTHESIA 1ST 15 MINUTES: Performed by: SURGERY

## 2020-04-02 PROCEDURE — D9220A PRA ANESTHESIA: ICD-10-PCS | Mod: ANES,,, | Performed by: ANESTHESIOLOGY

## 2020-04-02 PROCEDURE — 77001 FLUOROGUIDE FOR VEIN DEVICE: CPT | Mod: 26,,, | Performed by: SURGERY

## 2020-04-02 PROCEDURE — 36000706: Performed by: SURGERY

## 2020-04-02 PROCEDURE — 80048 BASIC METABOLIC PNL TOTAL CA: CPT

## 2020-04-02 PROCEDURE — 25000003 PHARM REV CODE 250: Performed by: SURGERY

## 2020-04-02 PROCEDURE — C9290 INJ, BUPIVACAINE LIPOSOME: HCPCS | Performed by: SURGERY

## 2020-04-02 PROCEDURE — 36561 INSERT TUNNELED CV CATH: CPT | Mod: 58,LT,, | Performed by: SURGERY

## 2020-04-02 DEVICE — PORT IV MRI 9.6FR ATT: Type: IMPLANTABLE DEVICE | Site: CHEST | Status: FUNCTIONAL

## 2020-04-02 RX ORDER — OXYCODONE AND ACETAMINOPHEN 5; 325 MG/1; MG/1
1 TABLET ORAL EVERY 4 HOURS PRN
Qty: 30 TABLET | Refills: 0 | Status: SHIPPED | OUTPATIENT
Start: 2020-04-02 | End: 2020-04-08

## 2020-04-02 RX ORDER — HEPARIN SODIUM 1000 [USP'U]/ML
INJECTION, SOLUTION INTRAVENOUS; SUBCUTANEOUS
Status: DISCONTINUED | OUTPATIENT
Start: 2020-04-02 | End: 2020-04-02 | Stop reason: HOSPADM

## 2020-04-02 RX ORDER — LIDOCAINE HCL/PF 100 MG/5ML
SYRINGE (ML) INTRAVENOUS
Status: DISCONTINUED | OUTPATIENT
Start: 2020-04-02 | End: 2020-04-02

## 2020-04-02 RX ORDER — MORPHINE SULFATE 10 MG/ML
3 INJECTION INTRAMUSCULAR; INTRAVENOUS; SUBCUTANEOUS
Status: DISCONTINUED | OUTPATIENT
Start: 2020-04-02 | End: 2020-04-02 | Stop reason: HOSPADM

## 2020-04-02 RX ORDER — EPHEDRINE SULFATE 50 MG/ML
INJECTION, SOLUTION INTRAVENOUS
Status: DISCONTINUED | OUTPATIENT
Start: 2020-04-02 | End: 2020-04-02

## 2020-04-02 RX ORDER — CEFAZOLIN SODIUM 1 G/3ML
INJECTION, POWDER, FOR SOLUTION INTRAMUSCULAR; INTRAVENOUS
Status: DISCONTINUED | OUTPATIENT
Start: 2020-04-02 | End: 2020-04-02

## 2020-04-02 RX ORDER — MIDAZOLAM HYDROCHLORIDE 1 MG/ML
INJECTION, SOLUTION INTRAMUSCULAR; INTRAVENOUS
Status: DISCONTINUED | OUTPATIENT
Start: 2020-04-02 | End: 2020-04-02

## 2020-04-02 RX ORDER — SODIUM CHLORIDE 9 MG/ML
INJECTION, SOLUTION INTRAVENOUS CONTINUOUS
Status: DISCONTINUED | OUTPATIENT
Start: 2020-04-02 | End: 2020-04-02 | Stop reason: HOSPADM

## 2020-04-02 RX ORDER — ACETAMINOPHEN 10 MG/ML
1000 INJECTION, SOLUTION INTRAVENOUS ONCE
Status: COMPLETED | OUTPATIENT
Start: 2020-04-02 | End: 2020-04-02

## 2020-04-02 RX ORDER — SUCCINYLCHOLINE CHLORIDE 20 MG/ML
INJECTION INTRAMUSCULAR; INTRAVENOUS
Status: DISCONTINUED | OUTPATIENT
Start: 2020-04-02 | End: 2020-04-02

## 2020-04-02 RX ORDER — ONDANSETRON 2 MG/ML
INJECTION INTRAMUSCULAR; INTRAVENOUS
Status: DISCONTINUED | OUTPATIENT
Start: 2020-04-02 | End: 2020-04-02

## 2020-04-02 RX ORDER — LIDOCAINE HYDROCHLORIDE 10 MG/ML
1 INJECTION, SOLUTION EPIDURAL; INFILTRATION; INTRACAUDAL; PERINEURAL ONCE
Status: DISCONTINUED | OUTPATIENT
Start: 2020-04-02 | End: 2020-04-02 | Stop reason: HOSPADM

## 2020-04-02 RX ORDER — FENTANYL CITRATE 50 UG/ML
INJECTION, SOLUTION INTRAMUSCULAR; INTRAVENOUS
Status: DISCONTINUED | OUTPATIENT
Start: 2020-04-02 | End: 2020-04-02

## 2020-04-02 RX ORDER — HYDROMORPHONE HYDROCHLORIDE 2 MG/ML
0.2 INJECTION, SOLUTION INTRAMUSCULAR; INTRAVENOUS; SUBCUTANEOUS EVERY 5 MIN PRN
Status: DISCONTINUED | OUTPATIENT
Start: 2020-04-02 | End: 2020-04-02 | Stop reason: HOSPADM

## 2020-04-02 RX ORDER — SODIUM CHLORIDE 0.9 % (FLUSH) 0.9 %
10 SYRINGE (ML) INJECTION
Status: DISCONTINUED | OUTPATIENT
Start: 2020-04-02 | End: 2020-04-02 | Stop reason: HOSPADM

## 2020-04-02 RX ORDER — SODIUM CHLORIDE, SODIUM LACTATE, POTASSIUM CHLORIDE, CALCIUM CHLORIDE 600; 310; 30; 20 MG/100ML; MG/100ML; MG/100ML; MG/100ML
INJECTION, SOLUTION INTRAVENOUS CONTINUOUS
Status: DISCONTINUED | OUTPATIENT
Start: 2020-04-02 | End: 2020-04-02 | Stop reason: HOSPADM

## 2020-04-02 RX ORDER — PROPOFOL 10 MG/ML
VIAL (ML) INTRAVENOUS
Status: DISCONTINUED | OUTPATIENT
Start: 2020-04-02 | End: 2020-04-02

## 2020-04-02 RX ORDER — BUPIVACAINE HYDROCHLORIDE 2.5 MG/ML
INJECTION, SOLUTION EPIDURAL; INFILTRATION; INTRACAUDAL
Status: DISCONTINUED | OUTPATIENT
Start: 2020-04-02 | End: 2020-04-02 | Stop reason: HOSPADM

## 2020-04-02 RX ADMIN — POTASSIUM CHLORIDE, DEXTROSE MONOHYDRATE AND SODIUM CHLORIDE: 150; 5; 450 INJECTION, SOLUTION INTRAVENOUS at 09:04

## 2020-04-02 RX ADMIN — FENTANYL CITRATE 100 MCG: 50 INJECTION INTRAMUSCULAR; INTRAVENOUS at 09:04

## 2020-04-02 RX ADMIN — EPHEDRINE SULFATE 25 MG: 50 INJECTION, SOLUTION INTRAMUSCULAR; INTRAVENOUS; SUBCUTANEOUS at 09:04

## 2020-04-02 RX ADMIN — SUCCINYLCHOLINE CHLORIDE 120 MG: 20 INJECTION, SOLUTION INTRAMUSCULAR; INTRAVENOUS at 09:04

## 2020-04-02 RX ADMIN — LIDOCAINE HYDROCHLORIDE 100 MG: 20 INJECTION, SOLUTION INTRAVENOUS at 09:04

## 2020-04-02 RX ADMIN — HYDROCODONE BITARTRATE AND ACETAMINOPHEN 1 TABLET: 5; 325 TABLET ORAL at 11:04

## 2020-04-02 RX ADMIN — CEFAZOLIN 3 G: 330 INJECTION, POWDER, FOR SOLUTION INTRAMUSCULAR; INTRAVENOUS at 09:04

## 2020-04-02 RX ADMIN — HYDROCODONE BITARTRATE AND ACETAMINOPHEN 1 TABLET: 5; 325 TABLET ORAL at 04:04

## 2020-04-02 RX ADMIN — MIDAZOLAM HYDROCHLORIDE 2 MG: 1 INJECTION, SOLUTION INTRAMUSCULAR; INTRAVENOUS at 09:04

## 2020-04-02 RX ADMIN — ONDANSETRON 4 MG: 2 INJECTION, SOLUTION INTRAMUSCULAR; INTRAVENOUS at 09:04

## 2020-04-02 RX ADMIN — SIMETHICONE CHEW TAB 80 MG 80 MG: 80 TABLET ORAL at 05:04

## 2020-04-02 RX ADMIN — POTASSIUM CHLORIDE, DEXTROSE MONOHYDRATE AND SODIUM CHLORIDE: 150; 5; 450 INJECTION, SOLUTION INTRAVENOUS at 05:04

## 2020-04-02 RX ADMIN — ACETAMINOPHEN 1000 MG: 10 INJECTION, SOLUTION INTRAVENOUS at 10:04

## 2020-04-02 RX ADMIN — PROPOFOL 200 MG: 10 INJECTION, EMULSION INTRAVENOUS at 09:04

## 2020-04-02 NOTE — PLAN OF CARE
Pt slept throughout evening.  Pt c/o abdominal pain, controlled by PRN norco and simethicone.  Pt c/o headache in PM, controlled with Tylenol.      Pt NPO since 0000 for procedure in AM.  CHG bath given in AM.    Fluids infusing per MAR.      Pt ambulating independently.

## 2020-04-02 NOTE — NURSING
0800  NPO for surgery.  Voided.      0900  BP meds not given, patient in surgery.    0913  To OR via bed.    1045   Back in room from OR.  Awake and c/o pain to Portacath incision.              Dressing dry with scant serosanguious drainage at edge.      1215  Eating lunch.    1300  Evaluated for discharge by .  Ok for discharge.    1400  Patient calling family to talk about her ride home.    1530  Dressed to go home.  Discharge instructions given.  All questions answered.   In good spirits.  Denies pain.            Belongings packed.

## 2020-04-02 NOTE — PLAN OF CARE
04/02/20 1342   Final Note   Assessment Type Final Discharge Note   Anticipated Discharge Disposition Home-Health   Hospital Follow Up  Appt(s) scheduled? Yes   Discharge plans and expectations educations in teach back method with documentation complete? Yes   Right Care Referral Info   Post Acute Recommendation Home-care   Referral Type home health    Facility Name [facility]   Post-Acute Status   Post-Acute Authorization Home Health   Home Health Status Set-up Complete   pts nurse Nash notified that the pt can d/c from CM standpoint.

## 2020-04-02 NOTE — SUBJECTIVE & OBJECTIVE
Past Medical History:   Diagnosis Date    Arthritis     Cancer     colon    Colonic mass     DVT (deep venous thrombosis)     Hypertension        Past Surgical History:   Procedure Laterality Date     SECTION, LOW TRANSVERSE  2010    COLECTOMY N/A 2020    Procedure: COLECTOMY;  Surgeon: Sky Patel MD;  Location: [facility] OR;  Service: General;  Laterality: N/A;    FLEXIBLE SIGMOIDOSCOPY N/A 2020    Procedure: SIGMOIDOSCOPY, FLEXIBLE;  Surgeon: Ez Bartlett MD;  Location: [facility] ENDO;  Service: Endoscopy;  Laterality: N/A;    TUBAL LIGATION             Review of patient's allergies indicates:  No Known Allergies    No current facility-administered medications on file prior to encounter.      Current Outpatient Medications on File Prior to Encounter   Medication Sig    apixaban (ELIQUIS) 2.5 mg Tab Take 2.5 mg by mouth 2 (two) times daily.     clotrimazole-betamethasone 1-0.05% (LOTRISONE) cream Apply topically 2 (two) times daily.    losartan-hydrochlorothiazide 100-25 mg (HYZAAR) 100-25 mg per tablet Take 1 tablet by mouth once daily.    nystatin (MYCOSTATIN) 100,000 unit/mL suspension Place 5 mL in your mouth, swish and retain in mouth for as long as possible (several minutes) before swallowing. Do this four times daily for one week.    ondansetron (ZOFRAN-ODT) 4 MG TbDL Take 1 tablet (4 mg total) by mouth every 6 (six) hours as needed (nausea).    oxyCODONE-acetaminophen (PERCOCET)  mg per tablet Take 1 tablet by mouth every 6 (six) hours as needed for Pain.    promethazine (PHENERGAN) 12.5 MG Tab Take 1 tablet (12.5 mg total) by mouth every 6 (six) hours as needed (nausea).     Family History     Problem Relation (Age of Onset)    Hypertension Mother        Tobacco Use    Smoking status: Former Smoker     Years: 8.00     Types: Cigarettes, Cigars    Smokeless tobacco: Never Used    Tobacco comment: black & mals   Substance and Sexual Activity    Alcohol use:  Never     Frequency: Never    Drug use: Not Currently     Comment: has not used in > 6 months    Sexual activity: Not Currently     Review of Systems   Constitutional: Positive for activity change and appetite change.   HENT: Negative for congestion and dental problem.    Respiratory: Negative for chest tightness.    Cardiovascular: Negative for chest pain.   Gastrointestinal: Positive for abdominal pain.   Endocrine: Negative for cold intolerance and heat intolerance.   Musculoskeletal: Negative for arthralgias and back pain.   Skin: Negative for color change and pallor.   Allergic/Immunologic: Negative for food allergies.   Neurological: Negative for dizziness and facial asymmetry.   Hematological: Negative for adenopathy. Does not bruise/bleed easily.   Psychiatric/Behavioral: Negative for agitation and behavioral problems.     Objective:     Vital Signs (Most Recent):  Temp: 98.5 °F (36.9 °C) (04/02/20 0519)  Pulse: 91 (04/02/20 0519)  Resp: 18 (04/02/20 0519)  BP: 134/63 (04/02/20 0519)  SpO2: 95 % (04/02/20 0519) Vital Signs (24h Range):  Temp:  [97.5 °F (36.4 °C)-98.5 °F (36.9 °C)] 98.5 °F (36.9 °C)  Pulse:  [74-91] 91  Resp:  [18-20] 18  SpO2:  [92 %-98 %] 95 %  BP: (109-222)/() 134/63     Weight: (!) 197.2 kg (434 lb 12 oz)  Body mass index is 52.92 kg/m².    Physical Exam   Constitutional: She is oriented to person, place, and time. No distress.   HENT:   Head: Atraumatic.   Eyes: Pupils are equal, round, and reactive to light. EOM are normal.   Neck: Normal range of motion. Neck supple.   Cardiovascular: Normal rate.   Pulmonary/Chest: Effort normal and breath sounds normal.   Abdominal: Soft. She exhibits distension. There is tenderness.   Musculoskeletal: Normal range of motion. She exhibits edema. She exhibits no deformity.   Neurological: She is oriented to person, place, and time. No cranial nerve deficit. Coordination normal.   Skin: Skin is warm and dry.   Psychiatric: She has a normal mood  and affect. Her behavior is normal.       Significant Labs:   BMP:   Recent Labs   Lab 03/31/20  0927 04/01/20  1204     --      --    K 2.7* 2.8*     --    CO2 30*  --    BUN 2*  --    CREATININE 0.7  --    CALCIUM 8.0*  --      CBC:   Recent Labs   Lab 03/31/20  0927 04/01/20  0540 04/01/20  0548   WBC 5.51 5.19  --    HGB 9.7* 9.8*  --    HCT 31.3* 31.5* 32*    294  --      CMP:   Recent Labs   Lab 03/31/20  0927 04/01/20  1204     --    K 2.7* 2.8*     --    CO2 30*  --      --    BUN 2*  --    CREATININE 0.7  --    CALCIUM 8.0*  --    PROT 6.9  --    ALBUMIN 2.3*  --    BILITOT 0.6  --    ALKPHOS 72  --    AST 13  --    ALT 13  --    ANIONGAP 9  --    EGFRNONAA >60  --        Significant Imaging: reviewed.

## 2020-04-02 NOTE — PLAN OF CARE
Jose R 9/10. Sleeps; wakes to voice. Oriented x 3. VSS per flow sheet. Denies pain. No n/v noted. Gauze dressing to left upper chest wall cdi. See chart for full assessment.

## 2020-04-02 NOTE — PROGRESS NOTES
[facility] CASE MANAGEMENT                  WRITTEN DISCHARGE INFORMATION      APPOINTMENTS AND RESOURCES TO HELP YOU MANAGE YOUR CARE AT HOME BASED ON YOUR PREFERENCES:  Follow-up Information     Sky Patel MD. Go on 4/8/2020.    Specialties:  General Surgery, Oncology  Why:  Outpatient Services: Hospital F/U with General Surgeon 2:30 pm.   Contact information:  [address]  [address]  [place] [address]  [phone]               Healthy Living Instructions to HELP MANAGE YOUR CARE AT HOME:  Things You are responsible for:  1.    Getting your prescriptions filled   2.    Taking your medications as directed, DO NOT MISS ANY DOSES!  3.    Following the diet and exercise recommended by your doctor  4.    Going to your follow-up doctor appointment. This is important because it allows the doctor to monitor your progress and determine if any changes need to made to your treatment plan.  5. If you have any questions about MANAGING YOUR CARE AT HOME Call the Nurse Care Line for 24/7 Assistance [phone]       Please answer any calls you may receive from [facility]. We want to continue to support you as you manage your healthcare needs. [facility] is happy to have the opportunity to serve you.      Thank you for choosing [facility] for your healthcare needs!  Your [facility] Case Management Team,    Tricia Chand   II  Care Management

## 2020-04-02 NOTE — BRIEF OP NOTE
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    DISPOSITION: Home or Self Care    FINAL DIAGNOSIS:  Acute pulmonary embolism    FOLLOWUP: In clinic    DISCHARGE INSTRUCTIONS:    Discharge Procedure Orders   Diet general     Call MD for:  temperature >100.4     Call MD for:  persistent nausea and vomiting     Call MD for:  severe uncontrolled pain     Call MD for:  difficulty breathing, headache or visual disturbances     Call MD for:  redness, tenderness, or signs of infection (pain, swelling, redness, odor or green/yellow discharge around incision site)     Call MD for:  hives     Remove dressing in 24 hours     Shower on day dressing removed (No bath)

## 2020-04-02 NOTE — PROGRESS NOTES
[facility] Hospital Medicine  Progress Note    Patient Name: Maryam Oneal  MRN: [id number]  Patient Class: IP- Inpatient   Admission Date: 4/1/2020  Length of Stay: 1 days  Attending Physician: Sky Patel MD  Primary Care Provider: Phuong Reynoso MD        Subjective:     Principal Problem:Acute pulmonary embolism        HPI:  38yo woman who initially presented with a large bowel obstruction in February, and underwent segmental colectomy and anastomosis, for colon malignancy. Was to have Port-a-cath placed today as elective procedure. However, prior to this her outpatient cardiologist felt she was high risk for PE due to elevated D-Dimer and sent her to the ED for CT scan prior to the procedure. CT chest shows moderate right sided PE. She has a hx of DVT in the past and was on Eliquis, however had stopped this long before her prior admission. She was restarted on Eliquis on her last admission but did not continue it as outpatient as instructed. She states she does not feel SOB or have any chest pain.      She does complain of abdominal pain which has been ongoing mildly since her surgery. Located at upper abdomen. No vomiting, does have occasional nausea. BMs have been normal and she tolerates PO. On her CTA, the upper abdomen shows a possible air/fluid collection.     Overview/Hospital Course:  40 years old female with known history of coon caner,folowed by surgery,S/P segmental colectomy and anastomosis,and should be have jordyn a cath for starting chemo. Has been told visit ER duo to elevated D-Dimmer.CTA chest show,There is pulmonary arterial filling defect involving the distal aspect of the right main pulmonary artery with extension to involve pulmonary arterial vasculature on the right superiorly and more prominent inferiorly, this is moderate to prominent volume of right-sided pulmonary thromboembolic disease consistent with pulmonary embolus. She has been started on lovenox,  CT  abdomen show also show possible fluid collection and free air,suretgry is admitted patient.  Patient is on RA at this time and denies chest pain and SOB.  Will have port a cath today.  Stable on RA,patient has already eliquis at home.  Will hold Lovenox today for procedure.  Past Medical History:   Diagnosis Date    Arthritis     Cancer     colon    Colonic mass     DVT (deep venous thrombosis)     Hypertension        Past Surgical History:   Procedure Laterality Date     SECTION, LOW TRANSVERSE  2010    COLECTOMY N/A 2020    Procedure: COLECTOMY;  Surgeon: Sky Patel MD;  Location: [facility] OR;  Service: General;  Laterality: N/A;    FLEXIBLE SIGMOIDOSCOPY N/A 2020    Procedure: SIGMOIDOSCOPY, FLEXIBLE;  Surgeon: Ez Bartlett MD;  Location: [facility] ENDO;  Service: Endoscopy;  Laterality: N/A;    TUBAL LIGATION             Review of patient's allergies indicates:  No Known Allergies    No current facility-administered medications on file prior to encounter.      Current Outpatient Medications on File Prior to Encounter   Medication Sig    apixaban (ELIQUIS) 2.5 mg Tab Take 2.5 mg by mouth 2 (two) times daily.     clotrimazole-betamethasone 1-0.05% (LOTRISONE) cream Apply topically 2 (two) times daily.    losartan-hydrochlorothiazide 100-25 mg (HYZAAR) 100-25 mg per tablet Take 1 tablet by mouth once daily.    nystatin (MYCOSTATIN) 100,000 unit/mL suspension Place 5 mL in your mouth, swish and retain in mouth for as long as possible (several minutes) before swallowing. Do this four times daily for one week.    ondansetron (ZOFRAN-ODT) 4 MG TbDL Take 1 tablet (4 mg total) by mouth every 6 (six) hours as needed (nausea).    oxyCODONE-acetaminophen (PERCOCET)  mg per tablet Take 1 tablet by mouth every 6 (six) hours as needed for Pain.    promethazine (PHENERGAN) 12.5 MG Tab Take 1 tablet (12.5 mg total) by mouth every 6 (six) hours as needed (nausea).     Family History      Problem Relation (Age of Onset)    Hypertension Mother        Tobacco Use    Smoking status: Former Smoker     Years: 8.00     Types: Cigarettes, Cigars    Smokeless tobacco: Never Used    Tobacco comment: black & mals   Substance and Sexual Activity    Alcohol use: Never     Frequency: Never    Drug use: Not Currently     Comment: has not used in > 6 months    Sexual activity: Not Currently     Review of Systems   Constitutional: Positive for activity change and appetite change.   HENT: Negative for congestion and dental problem.    Respiratory: Negative for chest tightness.    Cardiovascular: Negative for chest pain.   Gastrointestinal: Positive for abdominal pain.   Endocrine: Negative for cold intolerance and heat intolerance.   Musculoskeletal: Negative for arthralgias and back pain.   Skin: Negative for color change and pallor.   Allergic/Immunologic: Negative for food allergies.   Neurological: Negative for dizziness and facial asymmetry.   Hematological: Negative for adenopathy. Does not bruise/bleed easily.   Psychiatric/Behavioral: Negative for agitation and behavioral problems.     Objective:     Vital Signs (Most Recent):  Temp: 98.5 °F (36.9 °C) (04/02/20 0519)  Pulse: 91 (04/02/20 0519)  Resp: 18 (04/02/20 0519)  BP: 134/63 (04/02/20 0519)  SpO2: 95 % (04/02/20 0519) Vital Signs (24h Range):  Temp:  [97.5 °F (36.4 °C)-98.5 °F (36.9 °C)] 98.5 °F (36.9 °C)  Pulse:  [74-91] 91  Resp:  [18-20] 18  SpO2:  [92 %-98 %] 95 %  BP: (109-222)/() 134/63     Weight: (!) 197.2 kg (434 lb 12 oz)  Body mass index is 52.92 kg/m².    Physical Exam   Constitutional: She is oriented to person, place, and time. No distress.   HENT:   Head: Atraumatic.   Eyes: Pupils are equal, round, and reactive to light. EOM are normal.   Neck: Normal range of motion. Neck supple.   Cardiovascular: Normal rate.   Pulmonary/Chest: Effort normal and breath sounds normal.   Abdominal: Soft. She exhibits distension. There is  tenderness.   Musculoskeletal: Normal range of motion. She exhibits edema. She exhibits no deformity.   Neurological: She is oriented to person, place, and time. No cranial nerve deficit. Coordination normal.   Skin: Skin is warm and dry.   Psychiatric: She has a normal mood and affect. Her behavior is normal.       Significant Labs:   BMP:   Recent Labs   Lab 03/31/20  0927 04/01/20  1204     --      --    K 2.7* 2.8*     --    CO2 30*  --    BUN 2*  --    CREATININE 0.7  --    CALCIUM 8.0*  --      CBC:   Recent Labs   Lab 03/31/20  0927 04/01/20  0540 04/01/20  0548   WBC 5.51 5.19  --    HGB 9.7* 9.8*  --    HCT 31.3* 31.5* 32*    294  --      CMP:   Recent Labs   Lab 03/31/20  0927 04/01/20  1204     --    K 2.7* 2.8*     --    CO2 30*  --      --    BUN 2*  --    CREATININE 0.7  --    CALCIUM 8.0*  --    PROT 6.9  --    ALBUMIN 2.3*  --    BILITOT 0.6  --    ALKPHOS 72  --    AST 13  --    ALT 13  --    ANIONGAP 9  --    EGFRNONAA >60  --        Significant Imaging: reviewed.      Assessment/Plan:      * Acute pulmonary embolism  CTA chest show,There is pulmonary arterial filling defect involving the distal aspect of the right main pulmonary artery with extension to involve pulmonary arterial vasculature on the right superiorly and more prominent inferiorly, this is moderate to prominent volume of right-sided pulmonary thromboembolic disease consistent with pulmonary embolus. She has been started on lovenox,  Patient is on RA at this time and denies chest pain and SOB.agree with eliquis at DC time.  Remains stable on RA,she has already eliquis at home.      Hypokalemia  Replaced.      Essential hypertension  On HCTZ,losartan,uncontroled,added Norvasc and prn clonidine.  Improved.      Intraabdominal fluid collection  Per CT, abdomen,suregry is following.      History of colon cancer  Plan for port a cath placement by surgery in AM.      Morbid obesity with BMI of  50.0-59.9, adult  Weight lose as out patient.      Anemia of chronic disease  Will monitor.        VTE Risk Mitigation (From admission, onward)         Ordered     enoxaparin injection 170 mg  Every 12 hours      04/01/20 0841     Place sequential compression device  Until discontinued      04/01/20 0841     IP VTE HIGH RISK PATIENT  Once      04/01/20 0841                      Uziel Hill MD  Department of Hospital Medicine   [facility]

## 2020-04-02 NOTE — PROGRESS NOTES
WRITTEN HEALTHCARE DISCHARGE INFORMATION      Things that YOU are RESPONSIBLE for to Manage Your Care At Home:     1. Getting your prescriptions filled.  2. Taking you medications as directed. DO NOT MISS ANY DOSES!  3. Going to your follow-up doctor appointments. This is important because it allows the doctor to monitor your progress and to determine if any changes need to be made to your treatment plan.     If you are unable to make your follow up appointments, please call the number listed and reschedule this appointment.      ____________HELP AT HOME____________________     Experiencing any SIGNS or SYMPTOMS: YOU CAN     Schedule a same day appopintment with your Primary Care Doctor or  you can call [facility] Line for 24/7 assistance at [phone]     If you are experience any signs or symptoms that have become severe, Call 911 and come to your nearest Emergency Room.     Thank you for choosing [facility] and allowing us to care for you.   From your care management team:      You should receive a call from [facility] within 48-72 hours to help manage your care after discharge. Please try to make sure that you answer your phone for this important phone call.    Follow-up Information     Sky Patel MD. Go on 4/8/2020.    Specialties:  General Surgery, Oncology  Why:  Outpatient Services: Hospital F/U with General Surgeon 2:30 pm.   Contact information:  [address]  [address]  [place] [address]  [phone]             [facility] .    Specialty:  Home Health Services  Contact information:  [address]  [address]  [facility] [id number] [phone]             [facility]    Specialty:  Home Health Services  Why:  Home Health- call number provoided for questions or concerns regarding home health   Contact information:  [address] [facility] [address] [facility]  [address]  [place] [address]  [phone]

## 2020-04-02 NOTE — TRANSFER OF CARE
"Anesthesia Transfer of Care Note    Patient: Maryam Oneal    Procedure(s) Performed: Procedure(s) (LRB):  [id number] (Left)    Patient location: PACU    Anesthesia Type: general    Transport from OR: Transported from OR on 6-10 L/min O2 by face mask with adequate spontaneous ventilation    Post pain: adequate analgesia    Post assessment: no apparent anesthetic complications    Post vital signs: stable    Level of consciousness: awake and alert    Nausea/Vomiting: no nausea/vomiting    Complications: none    Transfer of care protocol was followed      Last vitals:   Visit Vitals  /65 (BP Location: Right arm, Patient Position: Lying)   Pulse 84   Temp 36.6 °C (97.9 °F) (Oral)   Resp 18   Ht 6' 4" (1.93 m)   Wt (!) 197.2 kg (434 lb 12 oz)   SpO2 100%   Breastfeeding? No   BMI 52.92 kg/m²     "

## 2020-04-02 NOTE — ASSESSMENT & PLAN NOTE
CTA chest show,There is pulmonary arterial filling defect involving the distal aspect of the right main pulmonary artery with extension to involve pulmonary arterial vasculature on the right superiorly and more prominent inferiorly, this is moderate to prominent volume of right-sided pulmonary thromboembolic disease consistent with pulmonary embolus. She has been started on lovenox,  Patient is on RA at this time and denies chest pain and SOB.agree with eliquis at DC time.  Remains stable on RA,she has already eliquis at home.

## 2020-04-02 NOTE — PROGRESS NOTES
Home health orders uploaded and sent via [employer] [facility] to 8 agencies to attempt to secure an accepting provider.  [place] to follow in [employer] for response.

## 2020-04-02 NOTE — OP NOTE
Port-A-Cath Insertion        DATE OF PROCEDURE: 04/02/2020     PREOPERATIVE DIAGNOSIS: Colon cancer     POSTOPERATIVE DIAGNOSIS: same     PROCEDURE PERFORMED: Port-A-Cath insertion via cephalic vein cut down, left.     SURGEON: Sky Patel M.D.     ANESTHESIA: General.     DESCRIPTION OF OPERATION: The patient was brought to the Operating Room, placed  on operating room table in supine position. Under adequate general anesthesia,  prepped and draped around her left shoulder in the usual sterile fashion.   Incision was made in deltopectoral groove, deepened to expose her cephalic vein.  Proximal and distal control was obtained. Small venotomy was performed.   Catheter was inserted to the cavoatrial junction and verified with fluoroscopy.   This was attached to a port, which was fixed to the patient's chest wall and   flushed. The wound was then closed in layers with absorbable suture.   Steri-Strips were applied as well as bandage.     ESTIMATED BLOOD LOSS: Minimal.

## 2020-04-03 ENCOUNTER — TELEPHONE (OUTPATIENT)
Dept: SURGERY | Facility: CLINIC | Age: 40
End: 2020-04-03

## 2020-04-03 NOTE — TELEPHONE ENCOUNTER
Faxed Op note from Port-A-Cath Procedure and last office notes on Patient to Sandee with [facility].

## 2020-04-03 NOTE — TELEPHONE ENCOUNTER
----- Message from Sue Rosenthal sent at 4/3/2020  9:25 AM CDT -----  Contact: [facility] [phone]  .Type: Patient Call Back    Who called: [facility]    What is the request in detail: requesting op note from pt's procedure. Please fax to [phone]    Can the clinic reply by [facility]? Call back     Would the patient rather a call back or a response via [facility]? Call back     Best call back number: [phone]

## 2020-04-04 NOTE — ANESTHESIA POSTPROCEDURE EVALUATION
Anesthesia Post Evaluation    Patient: Maryam Oneal    Procedure(s) Performed: Procedure(s) (LRB):  [id number] (Left)    Final Anesthesia Type: general    Patient location during evaluation: PACU  Patient participation: Yes- Able to Participate  Level of consciousness: awake and alert, oriented and awake  Post-procedure vital signs: reviewed and stable  Pain management: adequate  Airway patency: patent    PONV status at discharge: No PONV  Anesthetic complications: no      Cardiovascular status: blood pressure returned to baseline, hemodynamically stable and stable  Respiratory status: unassisted and spontaneous ventilation  Hydration status: euvolemic  Follow-up not needed.          Vitals Value Taken Time   /62 4/2/2020  4:07 PM   Temp 36.6 °C (97.9 °F) 4/2/2020  4:07 PM   Pulse 81 4/2/2020  4:07 PM   Resp 18 4/2/2020  4:07 PM   SpO2 98 % 4/2/2020  4:07 PM         Event Time     Out of Recovery 04/02/2020 11:33:05          Pain/Jose R Score: No data recorded

## 2020-04-08 ENCOUNTER — OFFICE VISIT (OUTPATIENT)
Dept: SURGERY | Facility: CLINIC | Age: 40
End: 2020-04-08
Payer: MEDICAID

## 2020-04-08 DIAGNOSIS — C18.5 MALIGNANT NEOPLASM OF SPLENIC FLEXURE: Primary | ICD-10-CM

## 2020-04-08 PROCEDURE — 99024 PR POST-OP FOLLOW-UP VISIT: ICD-10-PCS | Mod: ,,, | Performed by: SURGERY

## 2020-04-08 PROCEDURE — 99024 POSTOP FOLLOW-UP VISIT: CPT | Mod: ,,, | Performed by: SURGERY

## 2020-04-13 NOTE — PROGRESS NOTES
Subjective:       Patient ID: Maryam Oneal is a 40 y.o. female.    Chief Complaint: No chief complaint on file.    HPI follow up call for 39 yo female with colon cancer s/p port placement without complaints   Review of Systems   Constitutional: Negative.    HENT: Negative.    Eyes: Negative.    Respiratory: Negative.    Cardiovascular: Negative.    Gastrointestinal: Negative.    Endocrine: Negative.    Musculoskeletal: Negative.    Skin: Negative.    Allergic/Immunologic: Negative.    Neurological: Negative.    Hematological: Negative.    Psychiatric/Behavioral: Negative.    All other systems reviewed and are negative.      Objective:      Physical Exam    Assessment:       1. Malignant neoplasm of splenic flexure      doing well post op  Plan:       She is to see Dr Freed in follow up to start her chemotherapy.  I also rerouted her escript to a drugstore in [facility]

## 2020-04-16 ENCOUNTER — TELEPHONE (OUTPATIENT)
Dept: SURGERY | Facility: CLINIC | Age: 40
End: 2020-04-16

## 2020-04-16 NOTE — TELEPHONE ENCOUNTER
Attempted to contact pt, did not receive an answer unable to leave an message.         ----- Message from Elida Hyde sent at 4/16/2020  3:38 PM CDT -----  Contact: Patient  Type: Patient Call Back    Who called: Patient    What is the request in detail: Patient is requesting an update on medication. She called before about medication.    Can the clinic reply by [facility]? No    Would the patient rather a call back or a response via [facility]? Call    Best call back number: [phone]     Additional Information:    [id number] - [place], [place] - [address]  [address]  [place] [address]  Phone: [phone] Fax: [phone]

## 2020-04-17 ENCOUNTER — TELEPHONE (OUTPATIENT)
Dept: SURGERY | Facility: CLINIC | Age: 40
End: 2020-04-17

## 2020-04-17 NOTE — TELEPHONE ENCOUNTER
Spoke with pt and her mom at 11:30 A.m, informed them that I spoke with  and the pain medication was sent to her desired pharmacy. Pt verbalized understanding.

## 2020-05-04 ENCOUNTER — TELEPHONE (OUTPATIENT)
Dept: SURGERY | Facility: CLINIC | Age: 40
End: 2020-05-04

## 2020-05-04 NOTE — TELEPHONE ENCOUNTER
Pt called she is having pain around port site and is having trouble sleeping due to the pain.  Pt states no signs of redness or swelling.  Will discuss with  and get back with patient.        ----- Message from Elida Hyde sent at 5/4/2020 12:03 PM CDT -----  Contact: Home Health Nurse-  Type: Patient Call Back    Who called: [place]    What is the request in detail: Patient is experiencing pain with port. Please advise.    Can the clinic reply by [facility]? No    Would the patient rather a call back or a response via [facility]? Call    Best call back number: [phone] ([place])     [place] [phone]    Additional Information:n/a

## 2020-05-04 NOTE — TELEPHONE ENCOUNTER
Pt notified  wants her to use warm compresses and NSAID's for the pain by her port and to call us if symptoms do not improve-pt verbalized understanding.

## 2020-11-09 NOTE — PT/OT/SLP DISCHARGE
Chief Complaint   Patient presents with   â¢ Gyn Exam       HISTORY OF PRESENT ILLNESS:  Patient is a 33year old  alert female who presents today for annual exam. She does not currently have a PCP. Today she presents with no complaints. She reports that twice a year she feels an intense cramp that last for about an hour, however she has had a pelvic us completed that showed no s/s of ovarian cysts, endometriosis, fibroids, etc. She does not want anymore imaging or tests completed, does not affect her daily living. Will follow up if worsens    CONTRACEPTION: none - tracking cycle - her  may get vasectomy     ALLERGIES:  No Known Allergies  Outpatient Medications Marked as Taking for the 20 encounter (Office Visit) with Tremayne Castro CNM   Medication Sig Dispense Refill   â¢ calcium carbonate (TUMS) 500 MG chewable tablet Chew 2 tablets by mouth as needed for Heartburn. History reviewed. No pertinent past medical history.   Past Surgical History:   Procedure Laterality Date   â¢ ------------other-------------      tubes in ears as a child   â¢ ------------other-------------      Esophagus stretched     Social History     Socioeconomic History   â¢ Marital status: /Civil Union     Spouse name: Not on file   â¢ Number of children: Not on file   â¢ Years of education: Not on file   â¢ Highest education level: Not on file   Occupational History   â¢ Not on file   Social Needs   â¢ Financial resource strain: Not on file   â¢ Food insecurity     Worry: Not on file     Inability: Not on file   â¢ Transportation needs     Medical: Not on file     Non-medical: Not on file   Tobacco Use   â¢ Smoking status: Never Smoker   â¢ Smokeless tobacco: Never Used   Substance and Sexual Activity   â¢ Alcohol use: Yes     Frequency: Monthly or less     Drinks per session: 1 or 2     Binge frequency: Never     Comment: occasional prior to preg   â¢ Drug use: No   â¢ Sexual activity: Yes     Partners: Male     Birth Physical Therapy Discharge Summary    Name: Maryam Oneal  MRN: [id number]   Principal Problem: Large bowel obstruction     Patient Discharged from acute Physical Therapy on 3/2.  Please refer to prior PT noted date on 3/2 for functional status.     Assessment:     Goals partially met.    Objective:     GOALS:   Multidisciplinary Problems     Physical Therapy Goals        Problem: Physical Therapy Goal    Goal Priority Disciplines Outcome Goal Variances Interventions   Physical Therapy Goal     PT, PT/OT Ongoing, Progressing     Description:  Goals to be met by:      Patient will increase functional independence with mobility by performin. Gait  x 400 feet with Modified [place].                       Reasons for Discontinuation of Therapy Services  Transfer to alternate level of care.      Plan:     Patient Discharged to: Home with Home Health Service. And FREDDY Elizabeth, PT  3/3/2020   control/protection: None   Lifestyle   â¢ Physical activity     Days per week: Not on file     Minutes per session: Not on file   â¢ Stress: Not on file   Relationships   â¢ Social connections     Talks on phone: Not on file     Gets together: Not on file     Attends Taoism service: Not on file     Active member of club or organization: Not on file     Attends meetings of clubs or organizations: Not on file     Relationship status: Not on file   â¢ Intimate partner violence     Fear of current or ex partner: Not on file     Emotionally abused: Not on file     Physically abused: Not on file     Forced sexual activity: Not on file   Other Topics Concern   â¢ Not on file   Social History Narrative   â¢ Not on file     Family History   Problem Relation Age of Onset   â¢ Hypothyroid Father    â¢ Cancer, Ovarian Paternal Grandmother      OB History    Para Term  AB Living   2 2 2 0 0 2   SAB TAB Ectopic Molar Multiple Live Births   0 0 0 0 0 2       GYNECOLOGIC HISTORY:  Patient's last menstrual period was 10/15/2020. Menses are regular , lasting 5 days and moderate in flow. REVIEW OF SYSTEMS:  Constitutional: Denies headache. No weight changes. No fevers or chills. No fatigue. HEENT: Denies vision changes or hearing changes. No sinus problems. Breasts: Denies breast masses, pain or nipple discharge. Respiratory: No cough or shortness of breath. Cardiovascular: Denies chest pain, syncope or palpitations. Gastrointestinal: Denies nausea, vomiting, diarrhea, or constipation. Gynecological: Denies pelvic pain, vaginal discharge, itching, or odor. Endocrine: Denies hot flashes, night sweats, heat or cold intolerance. Hematologic: Denies easy bruising or bleeding disorders. Allergies/Immunologic: Denies seasonal allergies or any history of immunologic disorders. Musculoskeletal: Denies joint pain, swelling, or erythema. Skin: Denies rashes, significant lesions or pruritus.    Psychiatric: Denies anxiety, "depression, memory deficits, and appetite or sleep changes. Stressors: new house, covid, testing her 3year old for autism     OBJECTIVE:  Visit Vitals  /80   Pulse 90   Ht 5' 2"" (1.575 m)   Wt 92.9 kg   LMP 10/15/2020   BMI 37.46 kg/mÂ²     GENERAL APPEARANCE: The patient is a pleasant, normal-appearing female with normal affect and in no distress. NECK: Supple. No cervical lymphadenopathy. No thyromegaly, no nodules palpitated, trachea midline. LUNGS: Clear bilaterally with normal respiratory effort. HEART: Regular rate and rhythm. No murmurs noted pulses are full and symmetrical.  BREASTS: Breast exam performed seated and supine. No masses, nontender, no nipple discharge or lymphadenopathy. ABDOMEN: Soft, nontender, nondistended. No hepatosplenomegaly. No umbilical or inguinal hernias. SKIN: Warm and dry to touch. No lesions or rashes noted. PSYCHIATRIC/NEUROLOGIC: Appropriate mood and affect, normal recall, alert and oriented Ã3. EXTREMITIES: Warm and well perfused. No edema noted. Muscle strength and sensation are normal bilaterally 5/5 in both upper and lower extremities. GENITOURINARY:  Vulva: Inspection of her external genitalia reveals normal mons pubis, labia minora and labia majora. Normal appearing clitoris, urethral meatus and Clarence's glands. Bladder: No evidence of urethral or bladder tenderness. Vagina: Bartholin gland is normal to palpitation. Cervix: There is no cervical motion tenderness. Uterus: Uterus is normal size,midline, mobile and nontender. No adnexal masses are palpated. Adnexa are nontender to palpitation. Perineum: Perineum appears normal.  Anus: Normal with no apparent lesions. ASSESSMENT:  1. Well woman exam with routine gynecological exam    2. Need for influenza vaccination      PLAN:  1. Pap deferred, wnl result in 2018, due 2021  2. Denies need for STI screening   3. Discussed multi vitamin and vitamin D use   4.  RTC in 1 year and PRN   "

## 2020-11-16 ENCOUNTER — TELEPHONE (OUTPATIENT)
Dept: SURGERY | Facility: CLINIC | Age: 40
End: 2020-11-16

## 2020-11-16 NOTE — TELEPHONE ENCOUNTER
Pt notified of appt with  11/19/20 @ 9:30am          ----- Message from Kesha Esparza sent at 11/16/2020 11:39 AM CST -----  Contact: Self [phone]  Type:  Sooner Appointment Request    Patient is requesting a sooner appointment.  Patient declined first available appointment listed as well as another facility and provider .  Patient will not accept being placed on the waitlist and is requesting a message be sent to doctor.    Name of Caller: Self     When is the first available appointment? 1/28    Symptoms: check up/follow up    Would the patient rather a call back or a response via [facility]? Call back    Best Call Back Number: [phone]

## 2020-11-19 ENCOUNTER — OFFICE VISIT (OUTPATIENT)
Dept: SURGERY | Facility: CLINIC | Age: 40
End: 2020-11-19
Payer: MEDICAID

## 2020-11-19 VITALS
WEIGHT: 293 LBS | HEART RATE: 97 BPM | HEIGHT: 72 IN | BODY MASS INDEX: 39.68 KG/M2 | SYSTOLIC BLOOD PRESSURE: 135 MMHG | DIASTOLIC BLOOD PRESSURE: 85 MMHG

## 2020-11-19 DIAGNOSIS — C18.5 MALIGNANT NEOPLASM OF SPLENIC FLEXURE: Primary | ICD-10-CM

## 2020-11-19 PROCEDURE — 99214 PR OFFICE/OUTPT VISIT, EST, LEVL IV, 30-39 MIN: ICD-10-PCS | Mod: S$GLB,,, | Performed by: SURGERY

## 2020-11-19 PROCEDURE — 99214 OFFICE O/P EST MOD 30 MIN: CPT | Mod: S$GLB,,, | Performed by: SURGERY

## 2020-11-19 RX ORDER — LOSARTAN POTASSIUM 50 MG/1
50 TABLET ORAL DAILY
COMMUNITY
End: 2021-07-01 | Stop reason: SDUPTHER

## 2020-11-19 NOTE — PROGRESS NOTES
Subjective:       Patient ID: Maryam Oneal is a 40 y.o. female.    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    HENT: Negative.    Eyes: Negative.    Respiratory: Negative.    Cardiovascular: Negative.    Gastrointestinal: Negative.    Endocrine: Negative.    Musculoskeletal: Negative.    Integumentary:  Negative.   Allergic/Immunologic: Negative.    Neurological: Negative.    Hematological: Negative.    Psychiatric/Behavioral: Negative.    All other systems reviewed and are negative.        Objective:      Physical Exam  Vitals signs reviewed.   Constitutional:       Appearance: She is well-developed.   HENT:      Head: Normocephalic and atraumatic.      Right Ear: External ear normal.      Left Ear: External ear normal.      Nose: Nose normal.   Eyes:      Conjunctiva/sclera: Conjunctivae normal.      Pupils: Pupils are equal, round, and reactive to light.   Neck:      Musculoskeletal: Normal range of motion and neck supple.   Cardiovascular:      Rate and Rhythm: Normal rate and regular rhythm.      Heart sounds: Normal heart sounds.   Pulmonary:      Effort: Pulmonary effort is normal.      Breath sounds: Normal breath sounds.   Abdominal:      General: Bowel sounds are normal.      Palpations: Abdomen is soft.       Musculoskeletal: Normal range of motion.   Skin:     General: Skin is warm and dry.   Neurological:      Mental Status: She is alert and oriented to person, place, and time.      Deep Tendon Reflexes: Reflexes are normal and symmetric.   Psychiatric:         Behavior: Behavior normal.         Thought Content: Thought content normal.         Assessment:       1. Malignant neoplasm of splenic flexure      doing well post treatment  Plan:       I will get a CT scan and labs and have her come back

## 2020-11-24 ENCOUNTER — HOSPITAL ENCOUNTER (OUTPATIENT)
Dept: RADIOLOGY | Facility: HOSPITAL | Age: 40
Discharge: HOME | End: 2020-11-24
Attending: SURGERY
Payer: MEDICAID

## 2020-11-24 DIAGNOSIS — C18.5 MALIGNANT NEOPLASM OF SPLENIC FLEXURE: ICD-10-CM

## 2020-11-24 PROCEDURE — 74177 CT ABD & PELVIS W/CONTRAST: CPT | Mod: TC

## 2020-11-24 PROCEDURE — 25500020 PHARM REV CODE 255: Performed by: SURGERY

## 2020-11-24 PROCEDURE — 74177 CT ABDOMEN PELVIS WITH CONTRAST: ICD-10-PCS | Mod: 26,,, | Performed by: RADIOLOGY

## 2020-11-24 PROCEDURE — 74177 CT ABD & PELVIS W/CONTRAST: CPT | Mod: 26,,, | Performed by: RADIOLOGY

## 2020-11-24 RX ADMIN — IOHEXOL 100 ML: 350 INJECTION, SOLUTION INTRAVENOUS at 09:11

## 2020-11-24 RX ADMIN — IOHEXOL 15 ML: 300 INJECTION, SOLUTION INTRAVENOUS at 09:11

## 2020-11-25 ENCOUNTER — TELEPHONE (OUTPATIENT)
Dept: OBSTETRICS AND GYNECOLOGY | Facility: CLINIC | Age: 40
End: 2020-11-25

## 2020-11-25 NOTE — TELEPHONE ENCOUNTER
Returned phone call. Maryam Jm was calling on behalf of her daughter Mary Oneal to find out if patient was going to be induced today. Informed patients mother that someone from Dr. Keenan's office will contact Mary once the report from [facility] is done.

## 2021-02-19 ENCOUNTER — TELEPHONE (OUTPATIENT)
Dept: SURGERY | Facility: CLINIC | Age: 41
End: 2021-02-19

## 2021-03-08 ENCOUNTER — OFFICE VISIT (OUTPATIENT)
Dept: SURGERY | Facility: CLINIC | Age: 41
End: 2021-03-08
Payer: MEDICAID

## 2021-03-08 VITALS
WEIGHT: 293 LBS | BODY MASS INDEX: 39.68 KG/M2 | HEIGHT: 72 IN | SYSTOLIC BLOOD PRESSURE: 155 MMHG | DIASTOLIC BLOOD PRESSURE: 102 MMHG | HEART RATE: 83 BPM

## 2021-03-08 DIAGNOSIS — C18.5 MALIGNANT NEOPLASM OF SPLENIC FLEXURE: Primary | ICD-10-CM

## 2021-03-08 PROCEDURE — 99214 PR OFFICE/OUTPT VISIT, EST, LEVL IV, 30-39 MIN: ICD-10-PCS | Mod: S$GLB,,, | Performed by: SURGERY

## 2021-03-08 PROCEDURE — 99214 OFFICE O/P EST MOD 30 MIN: CPT | Mod: S$GLB,,, | Performed by: SURGERY

## 2021-06-30 ENCOUNTER — HOSPITAL ENCOUNTER (EMERGENCY)
Facility: HOSPITAL | Age: 41
Discharge: HOME | End: 2021-07-01
Attending: EMERGENCY MEDICINE
Payer: MEDICAID

## 2021-06-30 DIAGNOSIS — R07.9 CHEST PAIN, UNSPECIFIED TYPE: ICD-10-CM

## 2021-06-30 DIAGNOSIS — R91.8 PULMONARY NODULES: ICD-10-CM

## 2021-06-30 DIAGNOSIS — R51.9 ACUTE NONINTRACTABLE HEADACHE, UNSPECIFIED HEADACHE TYPE: ICD-10-CM

## 2021-06-30 DIAGNOSIS — R07.9 CHEST PAIN: Primary | ICD-10-CM

## 2021-06-30 DIAGNOSIS — Z85.038 HISTORY OF COLON CANCER: ICD-10-CM

## 2021-06-30 DIAGNOSIS — I16.0 HYPERTENSIVE URGENCY: ICD-10-CM

## 2021-06-30 LAB
ALBUMIN SERPL BCP-MCNC: 3.1 G/DL (ref 3.5–5.2)
ALP SERPL-CCNC: 82 U/L (ref 55–135)
ALT SERPL W/O P-5'-P-CCNC: 9 U/L (ref 10–44)
ANION GAP SERPL CALC-SCNC: 6 MMOL/L (ref 8–16)
AST SERPL-CCNC: 20 U/L (ref 10–40)
BASOPHILS # BLD AUTO: 0.03 K/UL (ref 0–0.2)
BASOPHILS NFR BLD: 0.5 % (ref 0–1.9)
BILIRUB SERPL-MCNC: 0.4 MG/DL (ref 0.1–1)
BNP SERPL-MCNC: 18 PG/ML (ref 0–99)
BUN SERPL-MCNC: 6 MG/DL (ref 6–20)
CALCIUM SERPL-MCNC: 8.7 MG/DL (ref 8.7–10.5)
CHLORIDE SERPL-SCNC: 107 MMOL/L (ref 95–110)
CO2 SERPL-SCNC: 25 MMOL/L (ref 23–29)
CREAT SERPL-MCNC: 0.8 MG/DL (ref 0.5–1.4)
CTP QC/QA: YES
DIFFERENTIAL METHOD: ABNORMAL
EOSINOPHIL # BLD AUTO: 0.1 K/UL (ref 0–0.5)
EOSINOPHIL NFR BLD: 0.8 % (ref 0–8)
ERYTHROCYTE [DISTWIDTH] IN BLOOD BY AUTOMATED COUNT: 15.9 % (ref 11.5–14.5)
EST. GFR  (AFRICAN AMERICAN): >60 ML/MIN/1.73 M^2
EST. GFR  (NON AFRICAN AMERICAN): >60 ML/MIN/1.73 M^2
GLUCOSE SERPL-MCNC: 102 MG/DL (ref 70–110)
HCT VFR BLD AUTO: 31.7 % (ref 37–48.5)
HGB BLD-MCNC: 9.9 G/DL (ref 12–16)
IMM GRANULOCYTES # BLD AUTO: 0.02 K/UL (ref 0–0.04)
IMM GRANULOCYTES NFR BLD AUTO: 0.3 % (ref 0–0.5)
LYMPHOCYTES # BLD AUTO: 2.4 K/UL (ref 1–4.8)
LYMPHOCYTES NFR BLD: 36 % (ref 18–48)
MAGNESIUM SERPL-MCNC: 1.8 MG/DL (ref 1.6–2.6)
MCH RBC QN AUTO: 24.4 PG (ref 27–31)
MCHC RBC AUTO-ENTMCNC: 31.2 G/DL (ref 32–36)
MCV RBC AUTO: 78 FL (ref 82–98)
MONOCYTES # BLD AUTO: 0.5 K/UL (ref 0.3–1)
MONOCYTES NFR BLD: 8.2 % (ref 4–15)
NEUTROPHILS # BLD AUTO: 3.6 K/UL (ref 1.8–7.7)
NEUTROPHILS NFR BLD: 54.2 % (ref 38–73)
NRBC BLD-RTO: 0 /100 WBC
PLATELET # BLD AUTO: 295 K/UL (ref 150–450)
PMV BLD AUTO: 9.4 FL (ref 9.2–12.9)
POTASSIUM SERPL-SCNC: 3.6 MMOL/L (ref 3.5–5.1)
PROT SERPL-MCNC: 7.8 G/DL (ref 6–8.4)
RBC # BLD AUTO: 4.06 M/UL (ref 4–5.4)
SARS-COV-2 RDRP RESP QL NAA+PROBE: NEGATIVE
SODIUM SERPL-SCNC: 138 MMOL/L (ref 136–145)
TROPONIN I SERPL DL<=0.01 NG/ML-MCNC: <0.006 NG/ML (ref 0–0.03)
WBC # BLD AUTO: 6.61 K/UL (ref 3.9–12.7)

## 2021-06-30 PROCEDURE — 83735 ASSAY OF MAGNESIUM: CPT | Performed by: EMERGENCY MEDICINE

## 2021-06-30 PROCEDURE — 80053 COMPREHEN METABOLIC PANEL: CPT | Performed by: EMERGENCY MEDICINE

## 2021-06-30 PROCEDURE — 93010 ELECTROCARDIOGRAM REPORT: CPT | Mod: ,,, | Performed by: INTERNAL MEDICINE

## 2021-06-30 PROCEDURE — U0002 COVID-19 LAB TEST NON-CDC: HCPCS | Performed by: EMERGENCY MEDICINE

## 2021-06-30 PROCEDURE — 99285 EMERGENCY DEPT VISIT HI MDM: CPT | Mod: 25

## 2021-06-30 PROCEDURE — 93005 ELECTROCARDIOGRAM TRACING: CPT

## 2021-06-30 PROCEDURE — 93010 EKG 12-LEAD: ICD-10-PCS | Mod: ,,, | Performed by: INTERNAL MEDICINE

## 2021-06-30 PROCEDURE — 96372 THER/PROPH/DIAG INJ SC/IM: CPT | Mod: 59

## 2021-06-30 PROCEDURE — 96375 TX/PRO/DX INJ NEW DRUG ADDON: CPT | Mod: 59

## 2021-06-30 PROCEDURE — 84484 ASSAY OF TROPONIN QUANT: CPT | Performed by: EMERGENCY MEDICINE

## 2021-06-30 PROCEDURE — 83880 ASSAY OF NATRIURETIC PEPTIDE: CPT | Performed by: EMERGENCY MEDICINE

## 2021-06-30 PROCEDURE — 85025 COMPLETE CBC W/AUTO DIFF WBC: CPT | Performed by: EMERGENCY MEDICINE

## 2021-06-30 PROCEDURE — 96374 THER/PROPH/DIAG INJ IV PUSH: CPT

## 2021-06-30 PROCEDURE — 96376 TX/PRO/DX INJ SAME DRUG ADON: CPT | Mod: 59

## 2021-06-30 RX ORDER — HYDRALAZINE HYDROCHLORIDE 20 MG/ML
10 INJECTION INTRAMUSCULAR; INTRAVENOUS
Status: COMPLETED | OUTPATIENT
Start: 2021-07-01 | End: 2021-07-01

## 2021-06-30 RX ORDER — LOSARTAN POTASSIUM 25 MG/1
50 TABLET ORAL
Status: COMPLETED | OUTPATIENT
Start: 2021-07-01 | End: 2021-07-01

## 2021-06-30 RX ADMIN — IOHEXOL 75 ML: 350 INJECTION, SOLUTION INTRAVENOUS at 11:06

## 2021-07-01 VITALS
WEIGHT: 293 LBS | HEIGHT: 72 IN | DIASTOLIC BLOOD PRESSURE: 67 MMHG | HEART RATE: 85 BPM | SYSTOLIC BLOOD PRESSURE: 155 MMHG | OXYGEN SATURATION: 100 % | RESPIRATION RATE: 20 BRPM | TEMPERATURE: 100 F | BODY MASS INDEX: 39.68 KG/M2

## 2021-07-01 PROCEDURE — 63600175 PHARM REV CODE 636 W HCPCS: Performed by: EMERGENCY MEDICINE

## 2021-07-01 PROCEDURE — 25500020 PHARM REV CODE 255: Performed by: EMERGENCY MEDICINE

## 2021-07-01 PROCEDURE — 25000003 PHARM REV CODE 250: Performed by: EMERGENCY MEDICINE

## 2021-07-01 RX ORDER — METOPROLOL SUCCINATE 50 MG/1
100 TABLET, EXTENDED RELEASE ORAL
Status: COMPLETED | OUTPATIENT
Start: 2021-07-01 | End: 2021-07-01

## 2021-07-01 RX ORDER — METOCLOPRAMIDE HYDROCHLORIDE 5 MG/ML
10 INJECTION INTRAMUSCULAR; INTRAVENOUS
Status: COMPLETED | OUTPATIENT
Start: 2021-07-01 | End: 2021-07-01

## 2021-07-01 RX ORDER — METOPROLOL SUCCINATE 100 MG/1
100 TABLET, EXTENDED RELEASE ORAL DAILY
Qty: 90 TABLET | Refills: 3 | Status: SHIPPED | OUTPATIENT
Start: 2021-07-01 | End: 2022-07-01

## 2021-07-01 RX ORDER — LOSARTAN POTASSIUM 25 MG/1
50 TABLET ORAL
Status: COMPLETED | OUTPATIENT
Start: 2021-07-01 | End: 2021-07-01

## 2021-07-01 RX ORDER — KETOROLAC TROMETHAMINE 10 MG/1
10 TABLET, FILM COATED ORAL EVERY 6 HOURS PRN
Qty: 20 TABLET | Refills: 1 | Status: SHIPPED | OUTPATIENT
Start: 2021-07-01

## 2021-07-01 RX ORDER — ENOXAPARIN SODIUM 150 MG/ML
1 INJECTION SUBCUTANEOUS
Status: COMPLETED | OUTPATIENT
Start: 2021-07-01 | End: 2021-07-01

## 2021-07-01 RX ORDER — KETOROLAC TROMETHAMINE 30 MG/ML
30 INJECTION, SOLUTION INTRAMUSCULAR; INTRAVENOUS
Status: COMPLETED | OUTPATIENT
Start: 2021-07-01 | End: 2021-07-01

## 2021-07-01 RX ORDER — HYDRALAZINE HYDROCHLORIDE 20 MG/ML
20 INJECTION INTRAMUSCULAR; INTRAVENOUS
Status: COMPLETED | OUTPATIENT
Start: 2021-07-01 | End: 2021-07-01

## 2021-07-01 RX ORDER — HYDRALAZINE HYDROCHLORIDE 20 MG/ML
10 INJECTION INTRAMUSCULAR; INTRAVENOUS
Status: COMPLETED | OUTPATIENT
Start: 2021-07-01 | End: 2021-07-01

## 2021-07-01 RX ORDER — METOPROLOL TARTRATE 1 MG/ML
10 INJECTION, SOLUTION INTRAVENOUS
Status: COMPLETED | OUTPATIENT
Start: 2021-07-01 | End: 2021-07-01

## 2021-07-01 RX ORDER — DIPHENHYDRAMINE HYDROCHLORIDE 50 MG/ML
25 INJECTION INTRAMUSCULAR; INTRAVENOUS
Status: COMPLETED | OUTPATIENT
Start: 2021-07-01 | End: 2021-07-01

## 2021-07-01 RX ORDER — LOSARTAN POTASSIUM 100 MG/1
100 TABLET ORAL DAILY
Qty: 90 TABLET | Refills: 3 | Status: SHIPPED | OUTPATIENT
Start: 2021-07-01

## 2021-07-01 RX ADMIN — LOSARTAN POTASSIUM 50 MG: 25 TABLET, FILM COATED ORAL at 12:07

## 2021-07-01 RX ADMIN — DIPHENHYDRAMINE HYDROCHLORIDE 25 MG: 50 INJECTION INTRAMUSCULAR; INTRAVENOUS at 02:07

## 2021-07-01 RX ADMIN — METOROPROLOL TARTRATE 10 MG: 5 INJECTION, SOLUTION INTRAVENOUS at 02:07

## 2021-07-01 RX ADMIN — METOPROLOL SUCCINATE 100 MG: 50 TABLET, EXTENDED RELEASE ORAL at 02:07

## 2021-07-01 RX ADMIN — ENOXAPARIN SODIUM 225 MG: 120 INJECTION SUBCUTANEOUS at 02:07

## 2021-07-01 RX ADMIN — HYDRALAZINE HYDROCHLORIDE 10 MG: 20 INJECTION INTRAMUSCULAR; INTRAVENOUS at 12:07

## 2021-07-01 RX ADMIN — METOCLOPRAMIDE 10 MG: 5 INJECTION, SOLUTION INTRAMUSCULAR; INTRAVENOUS at 02:07

## 2021-07-01 RX ADMIN — METOROPROLOL TARTRATE 10 MG: 5 INJECTION, SOLUTION INTRAVENOUS at 12:07

## 2021-07-01 RX ADMIN — KETOROLAC TROMETHAMINE 30 MG: 30 INJECTION, SOLUTION INTRAMUSCULAR; INTRAVENOUS at 02:07

## 2021-07-01 RX ADMIN — HYDRALAZINE HYDROCHLORIDE 20 MG: 20 INJECTION INTRAMUSCULAR; INTRAVENOUS at 02:07

## 2021-07-08 ENCOUNTER — TELEPHONE (OUTPATIENT)
Dept: SURGERY | Facility: CLINIC | Age: 41
End: 2021-07-08

## 2021-07-22 ENCOUNTER — TELEPHONE (OUTPATIENT)
Dept: SURGERY | Facility: CLINIC | Age: 41
End: 2021-07-22

## 2021-08-02 ENCOUNTER — OFFICE VISIT (OUTPATIENT)
Dept: SURGERY | Facility: CLINIC | Age: 41
End: 2021-08-02
Payer: MEDICAID

## 2021-08-02 VITALS
DIASTOLIC BLOOD PRESSURE: 99 MMHG | BODY MASS INDEX: 39.68 KG/M2 | HEIGHT: 72 IN | WEIGHT: 293 LBS | SYSTOLIC BLOOD PRESSURE: 163 MMHG | HEART RATE: 128 BPM

## 2021-08-02 DIAGNOSIS — C18.5 MALIGNANT NEOPLASM OF SPLENIC FLEXURE: Primary | ICD-10-CM

## 2021-08-02 PROCEDURE — 99214 PR OFFICE/OUTPT VISIT, EST, LEVL IV, 30-39 MIN: ICD-10-PCS | Mod: S$GLB,,, | Performed by: SURGERY

## 2021-08-02 PROCEDURE — 99214 OFFICE O/P EST MOD 30 MIN: CPT | Mod: S$GLB,,, | Performed by: SURGERY

## 2021-10-01 ENCOUNTER — HOSPITAL ENCOUNTER (EMERGENCY)
Facility: HOSPITAL | Age: 41
Discharge: HOME | End: 2021-10-01
Attending: EMERGENCY MEDICINE
Payer: MEDICAID

## 2021-10-01 VITALS
BODY MASS INDEX: 39.68 KG/M2 | SYSTOLIC BLOOD PRESSURE: 150 MMHG | DIASTOLIC BLOOD PRESSURE: 73 MMHG | WEIGHT: 293 LBS | OXYGEN SATURATION: 100 % | TEMPERATURE: 99 F | HEIGHT: 72 IN | RESPIRATION RATE: 20 BRPM | HEART RATE: 90 BPM

## 2021-10-01 DIAGNOSIS — Z86.718 HISTORY OF DEEP VEIN THROMBOSIS (DVT) OF LOWER EXTREMITY: ICD-10-CM

## 2021-10-01 DIAGNOSIS — Z86.711 HISTORY OF PULMONARY EMBOLISM: ICD-10-CM

## 2021-10-01 DIAGNOSIS — R06.02 SOB (SHORTNESS OF BREATH): Primary | ICD-10-CM

## 2021-10-01 LAB
ALBUMIN SERPL BCP-MCNC: 2.9 G/DL (ref 3.5–5.2)
ALP SERPL-CCNC: 88 U/L (ref 55–135)
ALT SERPL W/O P-5'-P-CCNC: 6 U/L (ref 10–44)
ANION GAP SERPL CALC-SCNC: 11 MMOL/L (ref 8–16)
AST SERPL-CCNC: 32 U/L (ref 10–40)
BASOPHILS # BLD AUTO: 0.03 K/UL (ref 0–0.2)
BASOPHILS NFR BLD: 0.4 % (ref 0–1.9)
BILIRUB SERPL-MCNC: 0.2 MG/DL (ref 0.1–1)
BNP SERPL-MCNC: <10 PG/ML (ref 0–99)
BUN SERPL-MCNC: 7 MG/DL (ref 6–20)
CALCIUM SERPL-MCNC: 9.7 MG/DL (ref 8.7–10.5)
CHLORIDE SERPL-SCNC: 103 MMOL/L (ref 95–110)
CO2 SERPL-SCNC: 22 MMOL/L (ref 23–29)
CREAT SERPL-MCNC: 0.9 MG/DL (ref 0.5–1.4)
DIFFERENTIAL METHOD: ABNORMAL
EOSINOPHIL # BLD AUTO: 0.1 K/UL (ref 0–0.5)
EOSINOPHIL NFR BLD: 0.8 % (ref 0–8)
ERYTHROCYTE [DISTWIDTH] IN BLOOD BY AUTOMATED COUNT: 16.9 % (ref 11.5–14.5)
EST. GFR  (AFRICAN AMERICAN): >60 ML/MIN/1.73 M^2
EST. GFR  (NON AFRICAN AMERICAN): >60 ML/MIN/1.73 M^2
GLUCOSE SERPL-MCNC: 118 MG/DL (ref 70–110)
HCT VFR BLD AUTO: 29 % (ref 37–48.5)
HGB BLD-MCNC: 8.7 G/DL (ref 12–16)
IMM GRANULOCYTES # BLD AUTO: 0.02 K/UL (ref 0–0.04)
IMM GRANULOCYTES NFR BLD AUTO: 0.3 % (ref 0–0.5)
INR PPP: 1 (ref 0.8–1.2)
LYMPHOCYTES # BLD AUTO: 2.8 K/UL (ref 1–4.8)
LYMPHOCYTES NFR BLD: 38.1 % (ref 18–48)
MCH RBC QN AUTO: 23.6 PG (ref 27–31)
MCHC RBC AUTO-ENTMCNC: 30 G/DL (ref 32–36)
MCV RBC AUTO: 79 FL (ref 82–98)
MONOCYTES # BLD AUTO: 0.6 K/UL (ref 0.3–1)
MONOCYTES NFR BLD: 8.6 % (ref 4–15)
NEUTROPHILS # BLD AUTO: 3.8 K/UL (ref 1.8–7.7)
NEUTROPHILS NFR BLD: 51.8 % (ref 38–73)
NRBC BLD-RTO: 0 /100 WBC
PLATELET # BLD AUTO: 431 K/UL (ref 150–450)
PMV BLD AUTO: 9 FL (ref 9.2–12.9)
POTASSIUM SERPL-SCNC: 4 MMOL/L (ref 3.5–5.1)
PROT SERPL-MCNC: 7.8 G/DL (ref 6–8.4)
PROTHROMBIN TIME: 10.7 SEC (ref 9–12.5)
RBC # BLD AUTO: 3.69 M/UL (ref 4–5.4)
SODIUM SERPL-SCNC: 136 MMOL/L (ref 136–145)
TROPONIN I SERPL DL<=0.01 NG/ML-MCNC: <0.006 NG/ML (ref 0–0.03)
WBC # BLD AUTO: 7.32 K/UL (ref 3.9–12.7)

## 2021-10-01 PROCEDURE — 63600175 PHARM REV CODE 636 W HCPCS: Performed by: EMERGENCY MEDICINE

## 2021-10-01 PROCEDURE — 80053 COMPREHEN METABOLIC PANEL: CPT | Performed by: EMERGENCY MEDICINE

## 2021-10-01 PROCEDURE — 84484 ASSAY OF TROPONIN QUANT: CPT | Performed by: EMERGENCY MEDICINE

## 2021-10-01 PROCEDURE — 85025 COMPLETE CBC W/AUTO DIFF WBC: CPT | Performed by: EMERGENCY MEDICINE

## 2021-10-01 PROCEDURE — 93005 ELECTROCARDIOGRAM TRACING: CPT

## 2021-10-01 PROCEDURE — 96372 THER/PROPH/DIAG INJ SC/IM: CPT | Mod: 59

## 2021-10-01 PROCEDURE — 99285 EMERGENCY DEPT VISIT HI MDM: CPT | Mod: 25

## 2021-10-01 PROCEDURE — 93010 ELECTROCARDIOGRAM REPORT: CPT | Mod: ,,, | Performed by: INTERNAL MEDICINE

## 2021-10-01 PROCEDURE — 93010 EKG 12-LEAD: ICD-10-PCS | Mod: ,,, | Performed by: INTERNAL MEDICINE

## 2021-10-01 PROCEDURE — 85610 PROTHROMBIN TIME: CPT | Performed by: EMERGENCY MEDICINE

## 2021-10-01 PROCEDURE — 83880 ASSAY OF NATRIURETIC PEPTIDE: CPT | Performed by: EMERGENCY MEDICINE

## 2021-10-01 PROCEDURE — 25500020 PHARM REV CODE 255: Performed by: EMERGENCY MEDICINE

## 2021-10-01 RX ORDER — ENOXAPARIN SODIUM 150 MG/ML
150 INJECTION SUBCUTANEOUS
Status: COMPLETED | OUTPATIENT
Start: 2021-10-01 | End: 2021-10-01

## 2021-10-01 RX ADMIN — IOHEXOL 75 ML: 350 INJECTION, SOLUTION INTRAVENOUS at 02:10

## 2021-10-01 RX ADMIN — ENOXAPARIN SODIUM 150 MG: 150 INJECTION SUBCUTANEOUS at 01:10

## 2021-10-25 ENCOUNTER — TELEPHONE (OUTPATIENT)
Dept: SURGERY | Facility: CLINIC | Age: 41
End: 2021-10-25
Payer: MEDICAID

## 2021-10-27 ENCOUNTER — OFFICE VISIT (OUTPATIENT)
Dept: SURGERY | Facility: CLINIC | Age: 41
End: 2021-10-27
Payer: MEDICAID

## 2021-10-27 VITALS
BODY MASS INDEX: 39.68 KG/M2 | SYSTOLIC BLOOD PRESSURE: 131 MMHG | HEIGHT: 72 IN | WEIGHT: 293 LBS | DIASTOLIC BLOOD PRESSURE: 83 MMHG | HEART RATE: 112 BPM

## 2021-10-27 DIAGNOSIS — C18.5 MALIGNANT NEOPLASM OF SPLENIC FLEXURE: Primary | ICD-10-CM

## 2021-10-27 DIAGNOSIS — R16.0 LIVER MASSES: ICD-10-CM

## 2021-10-27 PROCEDURE — 99214 OFFICE O/P EST MOD 30 MIN: CPT | Mod: S$GLB,,, | Performed by: SURGERY

## 2021-10-27 PROCEDURE — 99214 PR OFFICE/OUTPT VISIT, EST, LEVL IV, 30-39 MIN: ICD-10-PCS | Mod: S$GLB,,, | Performed by: SURGERY

## 2021-10-27 RX ORDER — OXYCODONE HYDROCHLORIDE 5 MG/1
5 CAPSULE ORAL EVERY 4 HOURS PRN
Qty: 30 CAPSULE | Refills: 0 | Status: SHIPPED | OUTPATIENT
Start: 2021-10-27

## 2021-10-28 ENCOUNTER — TELEPHONE (OUTPATIENT)
Dept: SURGERY | Facility: CLINIC | Age: 41
End: 2021-10-28
Payer: MEDICAID

## 2021-10-28 ENCOUNTER — TELEPHONE (OUTPATIENT)
Dept: FAMILY MEDICINE | Facility: CLINIC | Age: 41
End: 2021-10-28
Payer: MEDICAID

## 2022-11-30 NOTE — PLAN OF CARE
Problem: Adult Inpatient Plan of Care  Goal: Plan of Care Review  Outcome: Ongoing, Progressing  Goal: Patient-Specific Goal (Individualization)  Outcome: Ongoing, Progressing  Goal: Absence of Hospital-Acquired Illness or Injury  Outcome: Ongoing, Progressing  Goal: Optimal Comfort and Wellbeing  Outcome: Ongoing, Progressing  Goal: Readiness for Transition of Care  Outcome: Ongoing, Progressing  Goal: Rounds/Family Conference  Outcome: Ongoing, Progressing     Problem: Fall Injury Risk  Goal: Absence of Fall and Fall-Related Injury  Outcome: Ongoing, Progressing      Chief Complaint  CHB s/p cardiac pacemaker, Follow-up, and Congestive Heart Failure    Subjective    Patient clinically states he is feeling much better since pacemaker placement she has had 12 pound weight loss since her last visit her blood pressure at home is well controlled.    Past Medical History:   Diagnosis Date   • Arthritis    • CHB s/p cardiac pacemaker 10/25/2021   • Chronic HFrEF (heart failure with reduced ejection fraction)  10/25/2021    10/25/21 echo: Estimated right ventricular systolic pressure from tricuspid regurgitation is markedly elevated (>55 mmHg). Mild LVH. Estimated left ventricular EF = 45% Left ventricular systolic function is mildly decreased.     • GERD (gastroesophageal reflux disease)    • Glaucoma     LEFT EYE   • Hypertension    • Osteoporosis          Current Outpatient Medications:   •  albuterol sulfate  (90 Base) MCG/ACT inhaler, Inhale 2 puffs Every 4 (Four) Hours As Needed., Disp: , Rfl:   •  alendronate (FOSAMAX) 70 MG tablet, Take 1 tablet by mouth Every 7 (Seven) Days. Pt takes on Wednesdays, Disp: , Rfl:   •  brimonidine (ALPHAGAN) 0.2 % ophthalmic solution, Administer 1 drop to both eyes 2 (two) times a day., Disp: , Rfl:   •  calcium carbonate (TUMS) 500 MG chewable tablet, Chew 1 tablet Daily., Disp: , Rfl:   •  cholecalciferol (VITAMIN D3) 25 MCG (1000 UT) tablet, Take 1.25 Units by mouth Take As Directed. Every 5 days, Disp: , Rfl:   •  dorzolamide-timolol (COSOPT) 22.3-6.8 MG/ML ophthalmic solution, Administer 1 drop to both eyes 2 (Two) Times a Day., Disp: , Rfl:   •  ergocalciferol (ERGOCALCIFEROL) 1.25 MG (71793 UT) capsule, Take 50,000 Units by mouth Take As Directed. Pt takes every 5 days, Disp: , Rfl:   •  fluticasone (Flonase) 50 MCG/ACT nasal spray, 2 sprays into the nostril(s) as directed by provider Daily for 30 days., Disp: 16 g, Rfl: 5  •  hyoscyamine (ANASPAZ,LEVSIN) 0.125 MG tablet, Take 0.125 mg by mouth Every 4 (Four) Hours As Needed for  "Cramping., Disp: , Rfl:   •  latanoprost (XALATAN) 0.005 % ophthalmic solution, Administer 1 drop to both eyes Every Night., Disp: , Rfl:   •  lisinopril (PRINIVIL,ZESTRIL) 10 MG tablet, Take 1 tablet by mouth Daily., Disp: 90 tablet, Rfl: 3  •  Netarsudil Dimesylate (Rhopressa) 0.02 % solution, Apply 1 drop to eye(s) as directed by provider Daily., Disp: , Rfl:   •  pantoprazole (PROTONIX) 40 MG EC tablet, Take 1 tablet by mouth once daily, Disp: 30 tablet, Rfl: 4    There are no discontinued medications.  No Known Allergies     Social History     Tobacco Use   • Smoking status: Former     Packs/day: 2.00     Years: 40.00     Pack years: 80.00     Types: Cigarettes     Quit date: 2017     Years since quittin.6   • Smokeless tobacco: Never   Vaping Use   • Vaping Use: Former   • Substances: Nicotine   • Devices: Group[employer]   Substance Use Topics   • Alcohol use: Not Currently   • Drug use: Never       History reviewed. No pertinent family history.     Objective     /72   Pulse 78   Ht 160 cm (62.99\")   Wt 88.6 kg (195 lb 6.4 oz)   BMI 34.62 kg/m²       Physical Exam    General Appearance:   · no acute distress  · Alert and oriented x3  HENT:   · lips not cyanotic  · Atraumatic  Neck:  · No jvd   · supple  Respiratory:  · no respiratory distress  · normal breath sounds  · no rales  Cardiovascular:  · Regular rate and rhythm  · no S3, no S4   · no murmur  · no rub  Extremities  · No cyanosis  · lower extremity edema: none    Skin:   · warm, dry  · No rashes      Result Review :     proBNP   Date Value Ref Range Status   10/25/2021 2,605.0 (H) 0.0 - 900.0 pg/mL Final     CMP    CMP 3/1/22 3/1/22 5/23/22 9/1/22 9/1/22    0815 0815  0947 0947   Glucose   81     BUN   24 (A)     Creatinine 0.94  0.81  0.88   Sodium   138     Potassium   4.6     Chloride   104     Calcium  9.3 9.2 10.5    (A) Abnormal value               Lab Results   Component Value Date    TSH 1.190 2019      No results found " for: FREET4   No results found for: DDIMERQUANT  Magnesium   Date Value Ref Range Status   10/26/2021 1.9 1.6 - 2.4 mg/dL Final      No results found for: DIGOXIN   Lab Results   Component Value Date    TROPONINT 0.013 10/26/2021             Lab Results   Component Value Date    POCTROP 0.08 10/25/2021       Results for orders placed in visit on 05/02/22    Adult Transthoracic Echo Complete W/ Cont if Necessary Per Protocol    Interpretation Summary  · Estimated left ventricular EF = 50% Left ventricular systolic function is low normal with area of apical septal hypokinesis  · Left ventricular wall thickness is consistent with mild concentric hypertrophy.  · The left ventricular cavity is mildly dilated.                 Diagnoses and all orders for this visit:    1. CHB s/p cardiac pacemaker (Primary)  Assessment & Plan:  Device working normally on remote interrogation repeat office check on next visit      2. Chronic HFrEF (heart failure with reduced ejection fraction)   Assessment & Plan:  Patient with stable symptoms and weight blood pressure goal range continue on lisinopril 10 mg once a day encourage patient to keep daily weight log      3. Essential hypertension          Follow Up     Return in about 6 months (around 5/30/2023) for Follow with Jeffery Curry w/f/u.          Patient was given instructions and counseling regarding her condition or for health maintenance advice. Please see specific information pulled into the AVS if appropriate.

## (undated) DEVICE — BLANKET UPPER BODY 78.7X29.9IN

## (undated) DEVICE — Device

## (undated) DEVICE — KIT WING PAD POSITIONING

## (undated) DEVICE — RELOAD PROXIMATE GREEN 75MM

## (undated) DEVICE — SHEET THYROID W/ISO-BAC

## (undated) DEVICE — SUT 1 36IN PDS II VIO MONO

## (undated) DEVICE — NDL HYPO REG 25G X 1 1/2

## (undated) DEVICE — SOL 9P NACL IRR PIC IL

## (undated) DEVICE — SYR 10CC LUER LOCK

## (undated) DEVICE — SHEARS HARMONIC 36CM HD 1000I

## (undated) DEVICE — ELECTRODE REM PLYHSV RETURN 9

## (undated) DEVICE — APPLICATOR CHLORAPREP ORN 26ML

## (undated) DEVICE — SUT VICRYL PLUS 4-0 P3 18IN

## (undated) DEVICE — SOL NACL 0.9% INJ 250ML BG

## (undated) DEVICE — CANISTER SUCTION 2 LTR

## (undated) DEVICE — TUBING INSUFFLATION 10

## (undated) DEVICE — KIT ANTIFOG

## (undated) DEVICE — STAPLER INT PROX TX 30X3.5MM

## (undated) DEVICE — BINDER ABDOMINAL 9 46-62

## (undated) DEVICE — SET DECANTER MEDICHOICE

## (undated) DEVICE — SEE MEDLINE ITEM 146292

## (undated) DEVICE — SEE MEDLINE ITEM 107746

## (undated) DEVICE — COVER OVERHEAD SURG LT BLUE

## (undated) DEVICE — IRRIGATOR ENDOSCOPY DISP.

## (undated) DEVICE — SPONGE LAP 18X18 PREWASHED

## (undated) DEVICE — SUT CTD VICRYL 3-0 UND SUTU

## (undated) DEVICE — SEE MEDLINE ITEM 157117

## (undated) DEVICE — STAPLER SKIN PROXIMATE WIDE

## (undated) DEVICE — SEE MEDLINE ITEM 152622

## (undated) DEVICE — TROCAR ENDOPATH XCEL 5X100MM

## (undated) DEVICE — SEE L#120831

## (undated) DEVICE — BLADE SURG CARBON STEEL SZ11

## (undated) DEVICE — GLOVE BIOGEL 7.5

## (undated) DEVICE — JELLY LUBRICANT STERILE 4 OZ

## (undated) DEVICE — TRANSFER MATTRES LATERAL 34

## (undated) DEVICE — CATH URETHRAL 16FR RED

## (undated) DEVICE — NDL 18GA X1 1/2 REG BEVEL

## (undated) DEVICE — SUT VICRYL 3-0 27 SH

## (undated) DEVICE — CUTTER PROXIMATE BLUE 75MM

## (undated) DEVICE — KIT FIBRIN SEALANT EVICEL 5 ML

## (undated) DEVICE — TRAY FOLEY 16FR INFECTION CONT

## (undated) DEVICE — CLOSURE SKIN STERI STRIP 1/2X4

## (undated) DEVICE — SEAL CAP ASSEMBLY

## (undated) DEVICE — SUT VICRYL PLUS 3-0 SH 18IN

## (undated) DEVICE — SEE MEDLINE ITEM 146417

## (undated) DEVICE — SYR 3CC LUER LOC

## (undated) DEVICE — SUPPORT ULNA NERVE PROTECTOR

## (undated) DEVICE — SUT PROLENE 3-0 SH DA 36 BL

## (undated) DEVICE — PACK ENDOSCOPY GENERAL

## (undated) DEVICE — DRESSING TRANS 4X4 TEGADERM

## (undated) DEVICE — SUT VICRYL CTD 2-0 GI 27 SH

## (undated) DEVICE — SOL NS 1000CC

## (undated) DEVICE — DRAPE C-ARM FOR MOBILE XRAY